# Patient Record
Sex: MALE | Race: WHITE | NOT HISPANIC OR LATINO | Employment: FULL TIME | URBAN - NONMETROPOLITAN AREA
[De-identification: names, ages, dates, MRNs, and addresses within clinical notes are randomized per-mention and may not be internally consistent; named-entity substitution may affect disease eponyms.]

---

## 2017-02-09 ENCOUNTER — HOSPITAL ENCOUNTER (EMERGENCY)
Facility: HOSPITAL | Age: 62
Discharge: HOME | End: 2017-02-09
Admitting: NURSE PRACTITIONER

## 2017-02-09 ENCOUNTER — APPOINTMENT (OUTPATIENT)
Dept: CT IMAGING | Facility: HOSPITAL | Age: 62
End: 2017-02-09

## 2017-02-09 VITALS
SYSTOLIC BLOOD PRESSURE: 135 MMHG | RESPIRATION RATE: 20 BRPM | OXYGEN SATURATION: 99 % | HEART RATE: 90 BPM | WEIGHT: 220 LBS | HEIGHT: 71 IN | BODY MASS INDEX: 30.8 KG/M2 | DIASTOLIC BLOOD PRESSURE: 78 MMHG | TEMPERATURE: 97.8 F

## 2017-02-09 DIAGNOSIS — S01.112A EYELID LACERATION, LEFT, INITIAL ENCOUNTER: Primary | ICD-10-CM

## 2017-02-09 PROCEDURE — 96372 THER/PROPH/DIAG INJ SC/IM: CPT

## 2017-02-09 PROCEDURE — 25010000002 ONDANSETRON PER 1 MG: Performed by: NURSE PRACTITIONER

## 2017-02-09 PROCEDURE — 25010000002 HYDROMORPHONE PER 4 MG: Performed by: NURSE PRACTITIONER

## 2017-02-09 PROCEDURE — 70486 CT MAXILLOFACIAL W/O DYE: CPT

## 2017-02-09 PROCEDURE — 99284 EMERGENCY DEPT VISIT MOD MDM: CPT

## 2017-02-09 RX ORDER — LIDOCAINE HYDROCHLORIDE AND EPINEPHRINE 10; 10 MG/ML; UG/ML
20 INJECTION, SOLUTION INFILTRATION; PERINEURAL ONCE
Status: DISCONTINUED | OUTPATIENT
Start: 2017-02-09 | End: 2017-02-10 | Stop reason: HOSPADM

## 2017-02-09 RX ORDER — ONDANSETRON 2 MG/ML
4 INJECTION INTRAMUSCULAR; INTRAVENOUS ONCE
Status: COMPLETED | OUTPATIENT
Start: 2017-02-09 | End: 2017-02-09

## 2017-02-09 RX ADMIN — HYDROMORPHONE HYDROCHLORIDE 1 MG: 1 INJECTION, SOLUTION INTRAMUSCULAR; INTRAVENOUS; SUBCUTANEOUS at 21:37

## 2017-02-09 RX ADMIN — HYDROMORPHONE HYDROCHLORIDE 1 MG: 1 INJECTION, SOLUTION INTRAMUSCULAR; INTRAVENOUS; SUBCUTANEOUS at 22:56

## 2017-02-09 RX ADMIN — ONDANSETRON 4 MG: 2 INJECTION INTRAMUSCULAR; INTRAVENOUS at 21:37

## 2017-02-10 NOTE — ED PROVIDER NOTES
"Subjective   HPI Comments: Patient is a 7-year-old white male presents after tripping and falling and injuring his left eye with his finger.  He sustained a laceration to the left upper eyelid.  This occurred about an hour prior to arrival.    Patient is a 61 y.o. male presenting with eye problem.   History provided by:  Patient   used: No    Eye Problem       Review of Systems   Constitutional: Negative.    HENT: Negative.    Eyes:        Pt states that he tripped and fell and hit his eye with his finger. He denies syncope. He denies loc. He sustained a laceration to left upper lid    Respiratory: Negative.    Cardiovascular: Negative.    Gastrointestinal: Negative.    Endocrine: Negative.    Genitourinary: Negative.    Musculoskeletal: Negative.    Skin: Negative.    Allergic/Immunologic: Negative.    Neurological: Negative.    Hematological: Negative.    Psychiatric/Behavioral: Negative.    All other systems reviewed and are negative.      History reviewed. No pertinent past medical history.    No Known Allergies    Past Surgical History   Procedure Laterality Date   • Cholecystectomy     • Adenoidectomy         History reviewed. No pertinent family history.    Social History     Social History   • Marital status:      Spouse name: N/A   • Number of children: N/A   • Years of education: N/A     Social History Main Topics   • Smoking status: Current Every Day Smoker     Packs/day: 0.50   • Smokeless tobacco: None   • Alcohol use No   • Drug use: No   • Sexual activity: Not Asked     Other Topics Concern   • None     Social History Narrative   • None       Prior to Admission medications    Not on File       Visit Vitals   • /78   • Pulse 90   • Temp 97.8 °F (36.6 °C)   • Resp 20   • Ht 71\" (180.3 cm)   • Wt 220 lb (99.8 kg)   • SpO2 99%   • BMI 30.68 kg/m2       Objective   Physical Exam   Constitutional: He is oriented to person, place, and time. He appears well-developed and " well-nourished. No distress.   HENT:   Head: Normocephalic. Head is without raccoon's eyes, without Thayer's sign, without abrasion, without contusion and without laceration.   Right Ear: External ear normal.   Left Ear: External ear normal.   Nose: Nose normal.   Mouth/Throat: Oropharynx is clear and moist.   Eyes: Conjunctivae, EOM and lids are normal. Pupils are equal, round, and reactive to light.   Left eye: moderate amt of swelling to left upper lid. approx 2cm horizontal  laceration to left upper lid that extends to inner aspect of lid. There is another vertical laceration noted that extends through the lash line. Bleeding controlled. eoms intact    Neck: Trachea normal and normal range of motion. Neck supple. Normal carotid pulses and no JVD present. No spinous process tenderness and no muscular tenderness present. No rigidity. No tracheal deviation and normal range of motion present.   Cardiovascular: Normal rate, regular rhythm, normal heart sounds, intact distal pulses and normal pulses.    Pulmonary/Chest: Effort normal and breath sounds normal. No accessory muscle usage or stridor. No respiratory distress. He exhibits no mass, no tenderness, no bony tenderness, no laceration, no crepitus, no deformity and no swelling.   Abdominal: Soft. Normal aorta and bowel sounds are normal. He exhibits no distension and no pulsatile midline mass. There is no tenderness.   Musculoskeletal: Normal range of motion. He exhibits no edema, tenderness or deformity.        Cervical back: Normal. He exhibits no tenderness, no bony tenderness, no deformity and no pain.        Thoracic back: Normal. He exhibits no tenderness, no bony tenderness, no pain and no spasm.        Lumbar back: Normal. He exhibits normal range of motion, no tenderness, no bony tenderness and no deformity.   Neurological: He is alert and oriented to person, place, and time. He has normal strength and normal reflexes. He displays normal reflexes. No  cranial nerve deficit. He exhibits normal muscle tone. GCS eye subscore is 4. GCS verbal subscore is 5. GCS motor subscore is 6.   Skin: Skin is warm, dry and intact. He is not diaphoretic. No pallor.   Psychiatric: He has a normal mood and affect.   Nursing note and vitals reviewed.      Procedures         Lab Results (last 24 hours)     ** No results found for the last 24 hours. **          CT Facial Bones Without Contrast   ED Interpretation   1. Laceration of the eyelid that appears full thickness, minimal   laceration of the anterior surface of the globe difficult to exclude on   this examination. Correlate with physical findings. The globe is   otherwise intact with periorbital soft tissue swelling.   2. No facial bone fracture.       These findings were described on a digital voice clip recorded on the   PACS system at the time of dictation.       This report was finalized on 02/09/2017 21:54 by Dr. Kenroy Alves MD.      Final Result   1. Laceration of the eyelid that appears full thickness, minimal   laceration of the anterior surface of the globe difficult to exclude on   this examination. Correlate with physical findings. The globe is   otherwise intact with periorbital soft tissue swelling.   2. No facial bone fracture.       These findings were described on a digital voice clip recorded on the   PACS system at the time of dictation.       This report was finalized on 02/09/2017 21:54 by Dr. Kenroy Alves MD.          ED Course  ED Course   Comment By Time   Reviewed pt and pt care plan with dr mondragon- also assessed pt and in agreement with pt care plan. Call placed to dr pate for further  Monserrat Gresham, APRN 02/09 2127   Dr mondragon spoke with dr pate- will be in to see pt Monserrat Gresham, APRN 02/09 2217   Dr bhandari also here to see pt  Monserrat Gresham, APRN 02/09 2313   Dr pate advised to have follow up with him on Monday- will write for gentamycin opth ointment. Will be discharged home  soon in stable condition  Monserrat Gresham, APRN 02/09 2336          MDM  Number of Diagnoses or Management Options  Eyelid laceration, left, initial encounter: new and requires workup     Amount and/or Complexity of Data Reviewed  Clinical lab tests: ordered and reviewed  Tests in the radiology section of CPT®: ordered and reviewed  Independent visualization of images, tracings, or specimens: yes    Risk of Complications, Morbidity, and/or Mortality  Presenting problems: moderate  Diagnostic procedures: moderate  Management options: moderate    Patient Progress  Patient progress: stable      Final diagnoses:   Eyelid laceration, left, initial encounter          Monserrat Gresham, APRMOHINDER  02/13/17 2315

## 2017-02-10 NOTE — CONSULTS
Mr. Gates is a 61-year-old right-handed white male who fell on some stairs and stuck himself in his left thigh with his own finger sustaining a laceration of his upper eyelid.  The patient denies any visual changes loss of consciousness etc.    PAST MEDICAL HISTORY  None    PAST SURGICAL HISTORY   Cholecystectomy     MEDICATIONS  None    ALLERGIES  No known allergies    SOCIAL HISTORY  The patient smokes one half pack of cigarettes per day and drinks on very rare occasion    FAMILY HISTORY  Noncontributory    REVIEW OF SYSTEMS  No additional pertinent information gleaned    PHYSICAL EXAMINATION    There is a 3 mm full-thickness vertical laceration of the upper eyelid at approximately the midportion.  There is a 6 mm transverse laceration superficially along the skin at approximately the level of the superior border of the tarsal plate.    PROCEDURE    1% lidocaine with epinephrine was used for local infiltration anesthesia of the upper eyelid.  The vertical laceration was repaired using interrupted 6-0 nylon sutures.    ASSESSMENT  Full-thickness upper eyelid laceration    PLAN  The patient will apply gentamicin ophthalmic ointment 3 times a day and follow up with me in 4 days.

## 2017-02-10 NOTE — CONSULTS
Ophthalmology Consult Note    Referring Provider: Emergency Department  Reason for Consultation: left eye trauma    Patient Care Team:  Endy Ortega MD as PCP - General (General Practice)    Chief complaint left eye pain, trauma, eyelid laceration    Subjective .     History of present illness:  61 y.o. white male presented to ER tonight after falling and reportedly hitting his eyelid with his finger. Pt noticed he had a eyelid laceration leading to his visit to the ER. Pt denies any changes in vision. Denies photophobia. Reports mild pain around the left eye. Denies any floaters. Pt has not had any eye surgeries in past and wear OTC readers only.     Review of Systems  Pertinent items are noted in HPI, all other systems reviewed and negative    History  History reviewed. No pertinent past medical history., Past Surgical History   Procedure Laterality Date   • Cholecystectomy     • Adenoidectomy     , History reviewed. No pertinent family history., Social History   Substance Use Topics   • Smoking status: Current Every Day Smoker     Packs/day: 0.50   • Smokeless tobacco: None   • Alcohol use No   ,   (Not in a hospital admission) and Allergies:  Review of patient's allergies indicates no known allergies.    Objective     Vital Signs   Temp:  [97.8 °F (36.6 °C)] 97.8 °F (36.6 °C)  Heart Rate:  [110] 110  Resp:  [20] 20  BP: (142)/(84) 142/84    Physical Exam:  Mental Status: Awake, alert, and oriented x 3    Visual acuity:   Right eye: 20/25 near with readers   Left eye: 20/25 near with readers    Intraocular Pressure (obtained with tonopen)   Right eye: 9    Left eye: 9    Pupils: Pupils equally round and reactive to light and accomodation    Confrontational Visual fields: Full OU     Extraocular movements: Full OU    External:  Mild ecchymoses over DIAMOND and LLL, full thickness lid margin laceration of central DIAMOND. No proptosis.     Slitlamp exam:   Lids/lashes: Mild ecchymoses over DIAMOND and LLL, full  thickness lid margin laceration of central DIAMOND,   Conjunctiva/Sclera: white and quiet OU, no subconj heme  Cornea: Clear OU, no abrasion  Anterior chamber: , moderate depth, quiet OU, no heme or inflammation  Iris: Round and regular OU, no iridodialyis  Lens: moderate nuclear sclerosis OU    Dilated exam deferred.     Results Review:   I reviewed the patient's new clinical results.    CT- revealed normal globes with lid margin laceration with mild surrounding soft tissue swelling.     Assessment/Plan     Active Problems:    * No active hospital problems. *    1) Left upper eyelid laceration-  Globes well spared in trauma, no ocular damage.  Lid margin laceration of left upper eyelid. Dr. Hyatt planning to repair at bedside. Would recommend abx ointment (Erythromycin vs. Polysporin vs. Tobramycin) to wound tid x 5 days. Return precautions emphasized for any changes in vision, photophobia, eye pain.     I discussed the patients findings and my recommendations with patient, family, primary care team and consulting provider     Thanks for consult. Please call with any further questions.     Osorio Johnson MD  02/09/17  11:12 PM

## 2017-03-08 ENCOUNTER — HOSPITAL ENCOUNTER (EMERGENCY)
Facility: HOSPITAL | Age: 62
Discharge: HOME | End: 2017-03-08
Attending: FAMILY MEDICINE | Admitting: FAMILY MEDICINE

## 2017-03-08 ENCOUNTER — APPOINTMENT (OUTPATIENT)
Dept: CT IMAGING | Facility: HOSPITAL | Age: 62
End: 2017-03-08

## 2017-03-08 VITALS
RESPIRATION RATE: 14 BRPM | OXYGEN SATURATION: 98 % | SYSTOLIC BLOOD PRESSURE: 132 MMHG | HEIGHT: 71 IN | WEIGHT: 222.2 LBS | HEART RATE: 78 BPM | DIASTOLIC BLOOD PRESSURE: 77 MMHG | TEMPERATURE: 97.7 F | BODY MASS INDEX: 31.11 KG/M2

## 2017-03-08 DIAGNOSIS — N23 RENAL COLIC ON LEFT SIDE: Primary | ICD-10-CM

## 2017-03-08 DIAGNOSIS — N20.1 URETEROLITHIASIS: ICD-10-CM

## 2017-03-08 LAB
ALBUMIN SERPL-MCNC: 3.9 G/DL (ref 3.5–5)
ALBUMIN/GLOB SERPL: 1.1 G/DL (ref 1.1–2.5)
ALP SERPL-CCNC: 72 U/L (ref 24–120)
ALT SERPL W P-5'-P-CCNC: 50 U/L (ref 0–54)
AMYLASE SERPL-CCNC: 55 U/L (ref 30–110)
ANION GAP SERPL CALCULATED.3IONS-SCNC: 10 MMOL/L (ref 4–13)
AST SERPL-CCNC: 47 U/L (ref 7–45)
BACTERIA UR QL AUTO: ABNORMAL /HPF
BASOPHILS # BLD AUTO: 0.03 10*3/MM3 (ref 0–0.2)
BASOPHILS NFR BLD AUTO: 0.3 % (ref 0–2)
BILIRUB SERPL-MCNC: 1.1 MG/DL (ref 0.1–1)
BILIRUB UR QL STRIP: NEGATIVE
BUN BLD-MCNC: 21 MG/DL (ref 5–21)
BUN/CREAT SERPL: 14.2 (ref 7–25)
CALCIUM SPEC-SCNC: 9.4 MG/DL (ref 8.4–10.4)
CHLORIDE SERPL-SCNC: 97 MMOL/L (ref 98–110)
CLARITY UR: ABNORMAL
CO2 SERPL-SCNC: 27 MMOL/L (ref 24–31)
COLOR UR: YELLOW
CREAT BLD-MCNC: 1.48 MG/DL (ref 0.5–1.4)
DEPRECATED RDW RBC AUTO: 40.8 FL (ref 40–54)
EOSINOPHIL # BLD AUTO: 0.18 10*3/MM3 (ref 0–0.7)
EOSINOPHIL NFR BLD AUTO: 1.6 % (ref 0–4)
ERYTHROCYTE [DISTWIDTH] IN BLOOD BY AUTOMATED COUNT: 13.1 % (ref 12–15)
ETHANOL UR QL: <0.01 %
GFR SERPL CREATININE-BSD FRML MDRD: 48 ML/MIN/1.73
GLOBULIN UR ELPH-MCNC: 3.7 GM/DL
GLUCOSE BLD-MCNC: 103 MG/DL (ref 70–100)
GLUCOSE UR STRIP-MCNC: NEGATIVE MG/DL
HCT VFR BLD AUTO: 46 % (ref 40–52)
HGB BLD-MCNC: 16.2 G/DL (ref 14–18)
HGB UR QL STRIP.AUTO: ABNORMAL
HYALINE CASTS UR QL AUTO: ABNORMAL /LPF
IMM GRANULOCYTES # BLD: 0.02 10*3/MM3 (ref 0–0.03)
IMM GRANULOCYTES NFR BLD: 0.2 % (ref 0–5)
KETONES UR QL STRIP: NEGATIVE
LEUKOCYTE ESTERASE UR QL STRIP.AUTO: ABNORMAL
LIPASE SERPL-CCNC: 34 U/L (ref 23–203)
LYMPHOCYTES # BLD AUTO: 1.6 10*3/MM3 (ref 0.72–4.86)
LYMPHOCYTES NFR BLD AUTO: 14.4 % (ref 15–45)
MCH RBC QN AUTO: 30.2 PG (ref 28–32)
MCHC RBC AUTO-ENTMCNC: 35.2 G/DL (ref 33–36)
MCV RBC AUTO: 85.7 FL (ref 82–95)
MONOCYTES # BLD AUTO: 1.37 10*3/MM3 (ref 0.19–1.3)
MONOCYTES NFR BLD AUTO: 12.4 % (ref 4–12)
NEUTROPHILS # BLD AUTO: 7.89 10*3/MM3 (ref 1.87–8.4)
NEUTROPHILS NFR BLD AUTO: 71.1 % (ref 39–78)
NITRITE UR QL STRIP: NEGATIVE
PH UR STRIP.AUTO: 6 [PH] (ref 5–8)
PLATELET # BLD AUTO: 202 10*3/MM3 (ref 130–400)
PMV BLD AUTO: 10.9 FL (ref 6–12)
POTASSIUM BLD-SCNC: 4.3 MMOL/L (ref 3.5–5.3)
PROT SERPL-MCNC: 7.6 G/DL (ref 6.3–8.7)
PROT UR QL STRIP: NEGATIVE
RBC # BLD AUTO: 5.37 10*6/MM3 (ref 4.8–5.9)
RBC # UR: ABNORMAL /HPF
REF LAB TEST METHOD: ABNORMAL
SODIUM BLD-SCNC: 134 MMOL/L (ref 135–145)
SP GR UR STRIP: <=1.005 (ref 1–1.03)
SQUAMOUS #/AREA URNS HPF: ABNORMAL /HPF
TROPONIN I SERPL-MCNC: <0.012 NG/ML (ref 0–0.03)
UROBILINOGEN UR QL STRIP: ABNORMAL
WBC NRBC COR # BLD: 11.09 10*3/MM3 (ref 4.8–10.8)
WBC UR QL AUTO: ABNORMAL /HPF

## 2017-03-08 PROCEDURE — 25010000002 ONDANSETRON PER 1 MG: Performed by: FAMILY MEDICINE

## 2017-03-08 PROCEDURE — 81001 URINALYSIS AUTO W/SCOPE: CPT | Performed by: FAMILY MEDICINE

## 2017-03-08 PROCEDURE — 84484 ASSAY OF TROPONIN QUANT: CPT | Performed by: FAMILY MEDICINE

## 2017-03-08 PROCEDURE — 93005 ELECTROCARDIOGRAM TRACING: CPT | Performed by: FAMILY MEDICINE

## 2017-03-08 PROCEDURE — 96361 HYDRATE IV INFUSION ADD-ON: CPT

## 2017-03-08 PROCEDURE — 96376 TX/PRO/DX INJ SAME DRUG ADON: CPT

## 2017-03-08 PROCEDURE — 99283 EMERGENCY DEPT VISIT LOW MDM: CPT

## 2017-03-08 PROCEDURE — 0 IOPAMIDOL 61 % SOLUTION: Performed by: FAMILY MEDICINE

## 2017-03-08 PROCEDURE — 96375 TX/PRO/DX INJ NEW DRUG ADDON: CPT

## 2017-03-08 PROCEDURE — 80053 COMPREHEN METABOLIC PANEL: CPT | Performed by: FAMILY MEDICINE

## 2017-03-08 PROCEDURE — 82150 ASSAY OF AMYLASE: CPT | Performed by: FAMILY MEDICINE

## 2017-03-08 PROCEDURE — 83690 ASSAY OF LIPASE: CPT | Performed by: FAMILY MEDICINE

## 2017-03-08 PROCEDURE — 85025 COMPLETE CBC W/AUTO DIFF WBC: CPT | Performed by: FAMILY MEDICINE

## 2017-03-08 PROCEDURE — 87086 URINE CULTURE/COLONY COUNT: CPT | Performed by: FAMILY MEDICINE

## 2017-03-08 PROCEDURE — 96374 THER/PROPH/DIAG INJ IV PUSH: CPT

## 2017-03-08 PROCEDURE — 25010000002 HYDROMORPHONE PER 4 MG: Performed by: FAMILY MEDICINE

## 2017-03-08 PROCEDURE — 93010 ELECTROCARDIOGRAM REPORT: CPT | Performed by: INTERNAL MEDICINE

## 2017-03-08 PROCEDURE — 80307 DRUG TEST PRSMV CHEM ANLYZR: CPT | Performed by: FAMILY MEDICINE

## 2017-03-08 PROCEDURE — 74177 CT ABD & PELVIS W/CONTRAST: CPT

## 2017-03-08 PROCEDURE — 25010000002 KETOROLAC TROMETHAMINE PER 15 MG: Performed by: FAMILY MEDICINE

## 2017-03-08 RX ORDER — ONDANSETRON 2 MG/ML
2 INJECTION INTRAMUSCULAR; INTRAVENOUS ONCE
Status: COMPLETED | OUTPATIENT
Start: 2017-03-08 | End: 2017-03-08

## 2017-03-08 RX ORDER — KETOROLAC TROMETHAMINE 30 MG/ML
30 INJECTION, SOLUTION INTRAMUSCULAR; INTRAVENOUS ONCE
Status: COMPLETED | OUTPATIENT
Start: 2017-03-08 | End: 2017-03-08

## 2017-03-08 RX ORDER — ONDANSETRON 4 MG/1
4 TABLET, ORALLY DISINTEGRATING ORAL 4 TIMES DAILY PRN
Qty: 20 TABLET | Refills: 0 | Status: SHIPPED | OUTPATIENT
Start: 2017-03-08

## 2017-03-08 RX ORDER — KETOROLAC TROMETHAMINE 30 MG/ML
INJECTION, SOLUTION INTRAMUSCULAR; INTRAVENOUS
Status: DISCONTINUED
Start: 2017-03-08 | End: 2017-03-08 | Stop reason: HOSPADM

## 2017-03-08 RX ORDER — HYDROCODONE BITARTRATE AND ACETAMINOPHEN 7.5; 325 MG/1; MG/1
1 TABLET ORAL EVERY 4 HOURS PRN
Qty: 18 TABLET | Refills: 0 | Status: SHIPPED | OUTPATIENT
Start: 2017-03-08

## 2017-03-08 RX ORDER — ONDANSETRON 2 MG/ML
4 INJECTION INTRAMUSCULAR; INTRAVENOUS ONCE
Status: COMPLETED | OUTPATIENT
Start: 2017-03-08 | End: 2017-03-08

## 2017-03-08 RX ORDER — SODIUM CHLORIDE 9 MG/ML
125 INJECTION, SOLUTION INTRAVENOUS CONTINUOUS
Status: DISCONTINUED | OUTPATIENT
Start: 2017-03-08 | End: 2017-03-08 | Stop reason: HOSPADM

## 2017-03-08 RX ORDER — KETOROLAC TROMETHAMINE 10 MG/1
10 TABLET, FILM COATED ORAL EVERY 6 HOURS PRN
Qty: 10 TABLET | Refills: 0 | Status: SHIPPED | OUTPATIENT
Start: 2017-03-08

## 2017-03-08 RX ORDER — TAMSULOSIN HYDROCHLORIDE 0.4 MG/1
1 CAPSULE ORAL DAILY
Qty: 30 CAPSULE | Refills: 0 | Status: SHIPPED | OUTPATIENT
Start: 2017-03-08

## 2017-03-08 RX ADMIN — ONDANSETRON 4 MG: 2 INJECTION INTRAMUSCULAR; INTRAVENOUS at 19:57

## 2017-03-08 RX ADMIN — KETOROLAC TROMETHAMINE 30 MG: 30 INJECTION, SOLUTION INTRAMUSCULAR at 20:48

## 2017-03-08 RX ADMIN — HYDROMORPHONE HYDROCHLORIDE 0.5 MG: 1 INJECTION, SOLUTION INTRAMUSCULAR; INTRAVENOUS; SUBCUTANEOUS at 19:58

## 2017-03-08 RX ADMIN — SODIUM CHLORIDE 125 ML/HR: 9 INJECTION, SOLUTION INTRAVENOUS at 20:00

## 2017-03-08 RX ADMIN — ONDANSETRON 2 MG: 2 INJECTION INTRAMUSCULAR; INTRAVENOUS at 20:42

## 2017-03-08 RX ADMIN — IOPAMIDOL 100 ML: 612 INJECTION, SOLUTION INTRAVENOUS at 20:40

## 2017-03-08 RX ADMIN — HYDROMORPHONE HYDROCHLORIDE 1 MG: 1 INJECTION, SOLUTION INTRAMUSCULAR; INTRAVENOUS; SUBCUTANEOUS at 20:42

## 2017-03-09 ENCOUNTER — APPOINTMENT (OUTPATIENT)
Dept: PREADMISSION TESTING | Facility: HOSPITAL | Age: 62
End: 2017-03-09

## 2017-03-09 ENCOUNTER — OFFICE VISIT (OUTPATIENT)
Dept: UROLOGY | Facility: CLINIC | Age: 62
End: 2017-03-09

## 2017-03-09 VITALS
DIASTOLIC BLOOD PRESSURE: 76 MMHG | SYSTOLIC BLOOD PRESSURE: 128 MMHG | HEIGHT: 71 IN | WEIGHT: 224 LBS | BODY MASS INDEX: 31.36 KG/M2 | TEMPERATURE: 97.5 F

## 2017-03-09 DIAGNOSIS — N20.1 LEFT URETERAL CALCULUS: ICD-10-CM

## 2017-03-09 DIAGNOSIS — N20.1 LEFT URETERAL CALCULUS: Primary | ICD-10-CM

## 2017-03-09 LAB
BILIRUB BLD-MCNC: ABNORMAL MG/DL
CLARITY, POC: CLEAR
COLOR UR: YELLOW
GLUCOSE UR STRIP-MCNC: NEGATIVE MG/DL
KETONES UR QL: NEGATIVE
LEUKOCYTE EST, POC: ABNORMAL
NITRITE UR-MCNC: NEGATIVE MG/ML
PH UR: 5 [PH] (ref 5–8)
PROT UR STRIP-MCNC: ABNORMAL MG/DL
RBC # UR STRIP: NEGATIVE /UL
SP GR UR: 1.02 (ref 1–1.03)
UROBILINOGEN UR QL: NORMAL

## 2017-03-09 PROCEDURE — 99204 OFFICE O/P NEW MOD 45 MIN: CPT | Performed by: UROLOGY

## 2017-03-09 PROCEDURE — 81003 URINALYSIS AUTO W/O SCOPE: CPT | Performed by: UROLOGY

## 2017-03-09 RX ORDER — SODIUM CHLORIDE, SODIUM LACTATE, POTASSIUM CHLORIDE, CALCIUM CHLORIDE 600; 310; 30; 20 MG/100ML; MG/100ML; MG/100ML; MG/100ML
100 INJECTION, SOLUTION INTRAVENOUS CONTINUOUS
Status: CANCELLED | OUTPATIENT
Start: 2017-03-09

## 2017-03-09 NOTE — ED PROVIDER NOTES
Subjective   Patient is a 61 y.o. male presenting with abdominal pain.   Abdominal Pain   Pain location:  LLQ  Pain quality: aching and cramping    Pain radiates to:  Does not radiate  Pain severity:  Mild  Onset quality:  Gradual  Duration:  4 days  Timing:  Intermittent  Progression:  Waxing and waning  Chronicity:  New  Context: not awakening from sleep, not diet changes, not eating, not laxative use, not medication withdrawal, not previous surgeries, not recent illness, not recent travel, not retching, not sick contacts, not suspicious food intake and not trauma    Relieved by:  Not moving  Worsened by:  Eating and movement  Associated symptoms: no anorexia, no belching, no chest pain, no chills, no constipation, no cough, no diarrhea, no dysuria, no fatigue, no fever, no hematemesis, no hematochezia, no hematuria, no melena, no nausea, no shortness of breath, no sore throat and no vomiting    Risk factors: has not had multiple surgeries        Review of Systems   Constitutional: Negative for chills, fatigue and fever.   HENT: Negative for sore throat.    Respiratory: Negative for cough and shortness of breath.    Cardiovascular: Negative for chest pain.   Gastrointestinal: Positive for abdominal pain. Negative for anorexia, constipation, diarrhea, hematemesis, hematochezia, melena, nausea and vomiting.   Genitourinary: Negative for dysuria and hematuria.       History reviewed. No pertinent past medical history.    No Known Allergies    Past Surgical History   Procedure Laterality Date   • Cholecystectomy     • Adenoidectomy         History reviewed. No pertinent family history.    Social History     Social History   • Marital status:      Spouse name: N/A   • Number of children: N/A   • Years of education: N/A     Social History Main Topics   • Smoking status: Current Every Day Smoker     Packs/day: 0.50   • Smokeless tobacco: None   • Alcohol use No   • Drug use: No   • Sexual activity: Not Asked      Other Topics Concern   • None     Social History Narrative           Objective   Physical Exam   Constitutional: He is oriented to person, place, and time. He appears well-developed and well-nourished. No distress.   HENT:   Head: Atraumatic.   Nose: Nose normal.   Mouth/Throat: Oropharynx is clear and moist.   Eyes: Conjunctivae are normal. Pupils are equal, round, and reactive to light. No scleral icterus.   Neck: Normal range of motion. Neck supple. No JVD present. No thyromegaly present.   Cardiovascular: Normal rate, regular rhythm, normal heart sounds and intact distal pulses.    No murmur heard.  Pulmonary/Chest: Effort normal and breath sounds normal. No respiratory distress. He has no wheezes. He has no rales. He exhibits no tenderness.   Abdominal: Soft. Bowel sounds are normal. He exhibits no distension and no mass. There is tenderness. There is no rebound and no guarding. No hernia.       Musculoskeletal: Normal range of motion. He exhibits no edema.   Lymphadenopathy:     He has no cervical adenopathy.   Neurological: He is alert and oriented to person, place, and time. He has normal reflexes. No cranial nerve deficit. Coordination normal.   Skin: Skin is warm and dry. No rash noted. He is not diaphoretic. No erythema. No pallor.   Psychiatric: He has a normal mood and affect. His behavior is normal. Judgment and thought content normal.   Nursing note and vitals reviewed.      Procedures         ED Course  ED Course   Comment By Time   Patient now pain free and requests to go home, I have placed a call to [facility] to arrange follow up.  Nima Jean MD 03/08 2116                  MDM  Number of Diagnoses or Management Options  Renal colic on left side: new and requires workup  Ureterolithiasis: new and requires workup     Amount and/or Complexity of Data Reviewed  Clinical lab tests: ordered and reviewed  Tests in the radiology section of CPT®: ordered and reviewed  Obtain history from someone  other than the patient: yes  Review and summarize past medical records: yes  Discuss the patient with other providers: yes  Independent visualization of images, tracings, or specimens: yes    Risk of Complications, Morbidity, and/or Mortality  Presenting problems: high  Diagnostic procedures: high  Management options: high    Patient Progress  Patient progress: improved      Final diagnoses:   Renal colic on left side   Ureterolithiasis            Nima Jean MD  03/08/17 [id number]

## 2017-03-09 NOTE — PROGRESS NOTES
Mr. Gates is 61 y.o. male    CHIEF COMPLAINT: I have a stone    HPI  Urolithiasis  Patient complains of left flank pain. This started 5 days(s) ago. The pain is described as aching and colicky without radiation at all. Associated manifestations include nausea. Severity, when pain is present,  is rated at 8/10 moderate relief has been experienced from oral pain medication. The patient has no pior history of urolithiasis.         The following portions of the patient's history were reviewed and updated as appropriate: allergies, current medications, past family history, past medical history, past social history, past surgical history and problem list.    Review of Systems   Constitutional: Negative for appetite change and fever.   HENT: Negative for hearing loss and sore throat.    Eyes: Negative for pain and redness.   Respiratory: Negative for cough and shortness of breath.    Cardiovascular: Negative for chest pain and leg swelling.   Gastrointestinal: Negative for anal bleeding, nausea and vomiting.   Endocrine: Negative for cold intolerance and heat intolerance.   Genitourinary: Positive for flank pain. Negative for dysuria and hematuria.   Musculoskeletal: Negative for joint swelling and myalgias.   Skin: Negative for color change and rash.   Allergic/Immunologic: Negative for immunocompromised state.   Neurological: Negative for dizziness and speech difficulty.   Hematological: Negative for adenopathy. Does not bruise/bleed easily.   Psychiatric/Behavioral: Negative for dysphoric mood and suicidal ideas.         Current Outpatient Prescriptions:   •  gentamicin (GENTAK) 0.3 % ophthalmic ointment, Administer  into the left eye 3 (Three) Times a Day., Disp: 3.5 g, Rfl: 0  •  HYDROcodone-acetaminophen (NORCO) 7.5-325 MG per tablet, Take 1 tablet by mouth Every 4 (Four) Hours As Needed for moderate pain (4-6) for up to 18 doses., Disp: 18 tablet, Rfl: 0  •  ketorolac (TORADOL) 10 MG tablet, Take 1 tablet by mouth  "Every 6 (Six) Hours As Needed for moderate pain (4-6) for up to 10 doses., Disp: 10 tablet, Rfl: 0  •  ondansetron ODT (ZOFRAN-ODT) 4 MG disintegrating tablet, Take 1 tablet by mouth 4 (Four) Times a Day As Needed for Nausea or Vomiting for up to 20 doses., Disp: 20 tablet, Rfl: 0  •  tamsulosin (FLOMAX) 0.4 MG capsule 24 hr capsule, Take 1 capsule by mouth Daily., Disp: 30 capsule, Rfl: 0  No current facility-administered medications for this visit.     History reviewed. No pertinent past medical history.    Past Surgical History   Procedure Laterality Date   • Cholecystectomy     • Adenoidectomy         Social History     Social History   • Marital status:      Spouse name: N/A   • Number of children: N/A   • Years of education: N/A     Social History Main Topics   • Smoking status: Current Every Day Smoker     Packs/day: 0.50   • Smokeless tobacco: None   • Alcohol use No   • Drug use: No   • Sexual activity: Not Asked     Other Topics Concern   • None     Social History Narrative       History reviewed. No pertinent family history.    Visit Vitals   • /76   • Temp 97.5 °F (36.4 °C)   • Ht 71\" (180.3 cm)   • Wt 224 lb (102 kg)   • BMI 31.24 kg/m2       Physical Exam   Constitutional: He is oriented to person, place, and time. He appears well-developed and well-nourished. No distress.   HENT:   Head: Normocephalic and atraumatic.   Right Ear: External ear and ear canal normal.   Left Ear: External ear and ear canal normal.   Nose: No nasal deformity. No epistaxis.   Mouth/Throat: Oropharynx is clear and moist. Mucous membranes are not pale, not dry and not cyanotic. Normal dentition. No oropharyngeal exudate.   Neck: Trachea normal. No tracheal tenderness present. No tracheal deviation present. No thyroid mass and no thyromegaly present.   Pulmonary/Chest: Effort normal. No accessory muscle usage. No respiratory distress. Chest wall is not dull to percussion (No flatness or hyperresonance). He " exhibits no mass and no tenderness.   On palpation, no tactile fremitus. All movements are symmetric. No intercostal retraction noted.    Abdominal: Soft. Normal appearance. He exhibits no distension and no mass. There is no hepatosplenomegaly. There is tenderness in the left upper quadrant. There is CVA tenderness (LEFT). No hernia.   Rectal examination or stool specimen is not indicated.    Musculoskeletal:   Normal gait and station. The spine, ribs, and pelvis are examined. No obvious misalignment or asymmetry. ROM is reasonable for age. No instability. No obvious atrophy, flaccidity or spasticity.    Lymphadenopathy:     He has no cervical adenopathy.        Right: No inguinal adenopathy present.        Left: No inguinal adenopathy present.   Neurological: He is alert and oriented to person, place, and time.   Skin: Skin is warm, dry and intact. No lesion and no rash noted. He is not diaphoretic. No cyanosis. No pallor. Nails show no clubbing.   On palpation, there were no induration, subcutaneous nodules, or tightening   Psychiatric: His speech is normal and behavior is normal. Judgment and thought content normal. His mood appears not anxious. His affect is not labile. He does not exhibit a depressed mood.   Vitals reviewed.        Results for orders placed or performed in visit on 03/09/17   POC Urinalysis Dipstick, Automated   Result Value Ref Range    Color Yellow Yellow, Straw, Dark Yellow, Yulia    Clarity, UA Clear Clear    Glucose, UA Negative Negative, 1000 mg/dL (3+) mg/dL    Bilirubin Small (1+) (A) Negative    Ketones, UA Negative Negative    Specific Gravity  1.025 1.005 - 1.030    Blood, UA Negative Negative    pH, Urine 5.0 5.0 - 8.0    Protein, POC Trace (A) Negative mg/dL    Urobilinogen, UA Normal Normal    Leukocytes Trace (A) Negative    Nitrite, UA Negative Negative     Independent review of a CT scan of abdomen and pelvis without contrast  The CT scan of the abdomen/pelvis done without  contrast is available for me to review.  Treatment recommendations require an independent review.  First I scanned the liver, spleen, and bowel pattern.  The retroperitoneum including the major vessels and lymphatic packages are briefly reviewed.  This film as been reviewed by the radiologist to determine any non urologic abnormalities that are present.  The kidneys are closely inspected for size, symmetry, contour, parenchymal thickness, perinephric reaction, presence of calcifications, and intrarenal dilation of the collecting system.  The ureters are inspected for their course, caliber, and any calcifications.  The bladder is inspected for its thickness, size, and presence of any calcifications.  This scan shows an 8 mm stone left distal ureter with moderate hydroureteronephrosis.  He also has bilateral renal calculi..        Assessment and Plan  Diagnoses and all orders for this visit:    Left ureteral calculus  -     POC Urinalysis Dipstick, Automated  -     Case Request; Standing  -     CBC (No Diff); Future  -     Basic Metabolic Panel; Future  -     Urinalysis With Culture if Indicated; Future  -     lactated ringers infusion; Infuse 100 mL/hr into a venous catheter Continuous.  -     ceFAZolin (ANCEF) 2 g in sodium chloride 0.9 % 100 mL IVPB; Infuse 2 g into a venous catheter 1 (One) Time.  -     Case Request    Other orders  -     Follow Anesthesia Guidelines / Standing Orders; Future  -     Obtain Informed Consent  -     Provide NPO Instructions to Patient  -     Verify NPO Status; Standing    The patient has a left distal ureteral calculus that is approximately 8 mm as defined by symptoms and radiographic studies reviewed with the patient.  Options for the management of such stone is discussed based upon size of stone, location, symptoms, and probable composition including watchful waiting, ESWL, ureteroscopy , use of ureteral stenting, percutaneous management, and open surgery.  Based upon this  discussion we have elected to proceed with ureteroscopic management of the stone.  The patient understands that a laser other fragmentation device may be needed.  The need for ureteral stenting including his required removal was discussed.  Risks of pain, discomfort from stent, ureteral stricture that may occur in the future, inability to render stone free, ureteral avulsion/perforation are all discussed.  I explained that in extreme cases this could lead to loss of function of the affected kidney.    Will need further evaluation with a 24-hour urine for metabolic evaluation why he is having onset of stones now bilaterally since she has never had them before.      Neftali Pereira MD  03/09/17  3:32 PM    EMR Dragon/Transcription disclaimer:  Much of this encounter note is an electronic transcription/translation of spoken language to printed text. The electronic translation of spoken language may permit erroneous, or at times, nonsensical words or phrases to be inadvertently transcribed; although I have reviewed the note for such errors, some may still exist.       Cc: Dr. Ortega

## 2017-03-09 NOTE — DISCHARGE INSTRUCTIONS
DAY OF SURGERY INSTRUCTIONS        YOUR SURGEON: ***CELY CHANDRAKANT    PROCEDURE: ***LITHOTRIPSY    DATE OF SURGERY: ***MARCH 10,2017    ARRIVAL TIME: AS DIRECTED BY OFFICE    DAY OF SURGERY TAKE ONLY THESE MEDICATIONS: ***NONE            BEFORE YOU COME TO THE HOSPITAL  (Pre-op instructions)  • Do not eat, drink, smoke or chew gum after midnight the night before surgery.  This also includes no mints.  • Morning of surgery take only the medicines you have been instructed with a sip of water unless otherwise instructed  by your physician.  • Do not shave, wear makeup or dark nail polish.  • Remove all jewelry including rings.  • Leave anything you consider valuable at home.  • Leave your suitcase in the car until after your surgery.  • Bring the following with you if applicable:  o Picture ID and insurance, Medicare or Medicaid cards  o Co-pay/deductible required by insurance (cash, check, credit card)  o Medications (no narcotics) in original bottles (not a list) including all over-the-counter medications.  o Copy of advance directive, living will or power-of- documents if not brought to PAT  o CPAP or BIPAP mask and tubing  o Skin prep instruction sheet  o Relaxation aids (MP3 player, book, magazine)  • Confirm your arrival time with you surgeon the day before your surgery (surgery times are subject to change)  • On the day of surgery check in at registration located at the main entrance of the hospital.       Outpatient Surgery Guidelines, Adult  Outpatient procedures are those for which the person having the procedure is allowed to go home the same day as the procedure. Various procedures are done on an outpatient basis. You should follow some general guidelines if you will be having an outpatient procedure.  LET YOUR HEALTH CARE PROVIDER KNOW ABOUT:  · Any allergies you have.  · All medicines you are taking, including vitamins, herbs, eye drops, creams, and over-the-counter medicines.  · Previous problems  you or members of your family have had with the use of anesthetics.  · Any blood disorders you have.  · Previous surgeries you have had.  · Medical conditions you have.  RISKS AND COMPLICATIONS  Your health care provider will discuss possible risks and complications with you before surgery. Common risks and complications include:    · Problems due to the use of anesthetics.  · Blood loss and replacement (does not apply to minor surgical procedures).  · Temporary increase in pain due to surgery.  · Uncorrected pain or problems that the surgery was meant to correct.  · Infection.  · New damage.  BEFORE THE PROCEDURE  · Ask your health care provider about changing or stopping your regular medicines. You may need to stop taking certain medicines in the days or weeks before the procedure.  · Stop smoking at least 2 weeks before surgery. This lowers your risk for complications during and after surgery. Ask your health care provider for help with this if needed.  · Eat your usual meals and a light supper the day before surgery. Continue fluid intake. Do not drink alcohol.  · Do not eat or drink after midnight the night before your surgery.   · Arrange for someone to take you home and to stay with you for 24 hours after the procedure. Medicine given for your procedure may affect your ability to drive or to care for yourself.  · Call your health care provider's office if you develop an illness or problem that may prevent you from safely having your procedure.  AFTER THE PROCEDURE  After surgery, you will be taken to a recovery area, where your progress will be monitored. If there are no complications, you will be allowed to go home when you are awake, stable, and taking fluids well. You may have numbness around the surgical site. Healing will take some time. You will have tenderness at the surgical site and may have some swelling and bruising. You may also have some nausea.  HOME CARE INSTRUCTIONS  · Do not drive for 24  hours, or as directed by your health care provider. Do not drive while taking prescription pain medicines.  · Do not drink alcohol for 24 hours.  · Do not make important decisions or sign legal documents for 24 hours.  · You may resume a normal diet and activities as directed.  · Do not lift anything heavier than 10 pounds (4.5 kg) or play contact sports until your health care provider says it is okay.  · Change your bandages (dressings) as directed.  · Only take over-the-counter or prescription medicines as directed by your health care provider.  · Follow up with your health care provider as directed.  SEEK MEDICAL CARE IF:  · You have increased bleeding (more than a small spot) from the surgical site.  · You have redness, swelling, or increasing pain in the wound.  · You see pus coming from the wound.  · You have a fever.  · You notice a bad smell coming from the wound or dressing.  · You feel lightheaded or faint.  · You develop a rash.  · You have trouble breathing.  · You develop allergies.  MAKE SURE YOU:  · Understand these instructions.  · Will watch your condition.  · Will get help right away if you are not doing well or get worse.     This information is not intended to replace advice given to you by your health care provider. Make sure you discuss any questions you have with your health care provider.     Document Released: 09/12/2002 Document Revised: 05/03/2016 Document Reviewed: 05/22/2014  [employer] Interactive Patient Education ©2016 [employer]       Fall Prevention in Hospitals, Adult  As a hospital patient, your condition and the treatments you receive can increase your risk for falls. Some additional risk factors for falls in a hospital include:  · Being in an unfamiliar environment.  · Being on bed rest.  · Your surgery.  · Taking certain medicines.  · Your tubing requirements, such as intravenous (IV) therapy or catheters.  It is important that you learn how to decrease fall risks while at the  hospital. Below are important tips that can help prevent falls.  SAFETY TIPS FOR PREVENTING FALLS  Talk about your risk of falling.  · Ask your health care provider why you are at risk for falling. Is it your medicine, illness, tubing placement, or something else?  · Make a plan with your health care provider to keep you safe from falls.  · Ask your health care provider or pharmacist about side effects of your medicines. Some medicines can make you dizzy or affect your coordination.  Ask for help.  · Ask for help before getting out of bed. You may need to press your call button.  · Ask for assistance in getting safely to the toilet.  · Ask for a walker or cane to be put at your bedside. Ask that most of the side rails on your bed be placed up before your health care provider leaves the room.  · Ask family or friends to sit with you.  · Ask for things that are out of your reach, such as your glasses, hearing aids, telephone, bedside table, or call button.  Follow these tips to avoid falling:  · Stay lying or seated, rather than standing, while waiting for help.  · Wear rubber-soled slippers or shoes whenever you walk in the hospital.  · Avoid quick, sudden movements.  ¨ Change positions slowly.  ¨ Sit on the side of your bed before standing.  ¨ Stand up slowly and wait before you start to walk.  · Let your health care provider know if there is a spill on the floor.  · Pay careful attention to the medical equipment, electrical cords, and tubes around you.  · When you need help, use your call button by your bed or in the bathroom. Wait for one of your health care providers to help you.  · If you feel dizzy or unsure of your footing, return to bed and wait for assistance.  · Avoid being distracted by the TV, telephone, or another person in your room.  · Do not lean or support yourself on rolling objects, such as IV poles or bedside tables.     This information is not intended to replace advice given to you by your  health care provider. Make sure you discuss any questions you have with your health care provider.     Document Released: 12/15/2001 Document Revised: 01/08/2016 Document Reviewed: 08/25/2013  [employer] Interactive Patient Education ©2016 [employer]       Surgical Site Infections FAQs  What is a Surgical Site Infection (SSI)?  A surgical site infection is an infection that occurs after surgery in the part of the body where the surgery took place. Most patients who have surgery do not develop an infection. However, infections develop in about 1 to 3 out of every 100 patients who have surgery.  Some of the common symptoms of a surgical site infection are:  · Redness and pain around the area where you had surgery  · Drainage of cloudy fluid from your surgical wound  · Fever  Can SSIs be treated?  Yes. Most surgical site infections can be treated with antibiotics. The antibiotic given to you depends on the bacteria (germs) causing the infection. Sometimes patients with SSIs also need another surgery to treat the infection.  What are some of the things that hospitals are doing to prevent SSIs?  To prevent SSIs, doctors, nurses, and other healthcare providers:  · Clean their hands and arms up to their elbows with an antiseptic agent just before the surgery.  · Clean their hands with soap and water or an alcohol-based hand rub before and after caring for each patient.  · May remove some of your hair immediately before your surgery using electric clippers if the hair is in the same area where the procedure will occur. They should not shave you with a razor.  · Wear special hair covers, masks, gowns, and gloves during surgery to keep the surgery area clean.  · Give you antibiotics before your surgery starts. In most cases, you should get antibiotics within 60 minutes before the surgery starts and the antibiotics should be stopped within 24 hours after surgery.  · Clean the skin at the site of your surgery with a special  soap that kills germs.  What can I do to help prevent SSIs?  Before your surgery:  · Tell your doctor about other medical problems you may have. Health problems such as allergies, diabetes, and obesity could affect your surgery and your treatment.  · Quit smoking. Patients who smoke get more infections. Talk to your doctor about how you can quit before your surgery.  · Do not shave near where you will have surgery. Shaving with a razor can irritate your skin and make it easier to develop an infection.  At the time of your surgery:  · Speak up if someone tries to shave you with a razor before surgery. Ask why you need to be shaved and talk with your surgeon if you have any concerns.  · Ask if you will get antibiotics before surgery.  After your surgery:  · Make sure that your healthcare providers clean their hands before examining you, either with soap and water or an alcohol-based hand rub.  · If you do not see your providers clean their hands, please ask them to do so.  · Family and friends who visit you should not touch the surgical wound or dressings.  · Family and friends should clean their hands with soap and water or an alcohol-based hand rub before and after visiting you. If you do not see them clean their hands, ask them to clean their hands.  What do I need to do when I go home from the hospital?  · Before you go home, your doctor or nurse should explain everything you need to know about taking care of your wound. Make sure you understand how to care for your wound before you leave the hospital.  · Always clean your hands before and after caring for your wound.  · Before you go home, make sure you know who to contact if you have questions or problems after you get home.  · If you have any symptoms of an infection, such as redness and pain at the surgery site, drainage, or fever, call your doctor immediately.  If you have additional questions, please ask your doctor or nurse.  Developed and co-sponsored by  The Society for Healthcare Epidemiology of [place] (SHEA); Infectious Diseases Society of [place] (IDSA); American Hospital Association; Association for Professionals in Infection Control and Epidemiology (APIC); Centers for Disease Control and Prevention (CDC); and The Joint Commission.     This information is not intended to replace advice given to you by your health care provider. Make sure you discuss any questions you have with your health care provider.     Document Released: 12/23/2014 Document Revised: 01/08/2016 Document Reviewed: 03/02/2016  [employer] Interactive Patient Education ©2016 [employer]     PARENT/GUARDIAN VERBALIZES UNDERSTANDING OF ABOVE EDUCATION

## 2017-03-10 ENCOUNTER — APPOINTMENT (OUTPATIENT)
Dept: GENERAL RADIOLOGY | Facility: HOSPITAL | Age: 62
End: 2017-03-10

## 2017-03-10 ENCOUNTER — ANESTHESIA EVENT (OUTPATIENT)
Dept: PERIOP | Facility: HOSPITAL | Age: 62
End: 2017-03-10

## 2017-03-10 ENCOUNTER — HOSPITAL ENCOUNTER (OUTPATIENT)
Facility: HOSPITAL | Age: 62
Setting detail: HOSPITAL OUTPATIENT SURGERY
Discharge: HOME | End: 2017-03-10
Attending: UROLOGY | Admitting: UROLOGY

## 2017-03-10 ENCOUNTER — ANESTHESIA (OUTPATIENT)
Dept: PERIOP | Facility: HOSPITAL | Age: 62
End: 2017-03-10

## 2017-03-10 VITALS
TEMPERATURE: 98 F | OXYGEN SATURATION: 96 % | HEART RATE: 88 BPM | RESPIRATION RATE: 14 BRPM | DIASTOLIC BLOOD PRESSURE: 90 MMHG | SYSTOLIC BLOOD PRESSURE: 134 MMHG

## 2017-03-10 DIAGNOSIS — N20.1 LEFT URETERAL CALCULUS: ICD-10-CM

## 2017-03-10 LAB — BACTERIA SPEC AEROBE CULT: NORMAL

## 2017-03-10 PROCEDURE — 25010000002 MIDAZOLAM PER 1 MG: Performed by: ANESTHESIOLOGY

## 2017-03-10 PROCEDURE — 25010000002 SUCCINYLCHOLINE PER 20 MG: Performed by: NURSE ANESTHETIST, CERTIFIED REGISTERED

## 2017-03-10 PROCEDURE — 52356 CYSTO/URETERO W/LITHOTRIPSY: CPT | Performed by: UROLOGY

## 2017-03-10 PROCEDURE — 0 IOPAMIDOL 61 % SOLUTION: Performed by: UROLOGY

## 2017-03-10 PROCEDURE — 25010000002 ONDANSETRON PER 1 MG: Performed by: NURSE ANESTHETIST, CERTIFIED REGISTERED

## 2017-03-10 PROCEDURE — 82360 CALCULUS ASSAY QUANT: CPT | Performed by: UROLOGY

## 2017-03-10 PROCEDURE — C1758 CATHETER, URETERAL: HCPCS | Performed by: UROLOGY

## 2017-03-10 PROCEDURE — 25010000002 PROPOFOL 10 MG/ML EMULSION: Performed by: NURSE ANESTHETIST, CERTIFIED REGISTERED

## 2017-03-10 PROCEDURE — 25010000003 CEFAZOLIN PER 500 MG: Performed by: UROLOGY

## 2017-03-10 PROCEDURE — 88300 SURGICAL PATH GROSS: CPT | Performed by: UROLOGY

## 2017-03-10 PROCEDURE — 25010000002 DEXAMETHASONE PER 1 MG: Performed by: NURSE ANESTHETIST, CERTIFIED REGISTERED

## 2017-03-10 PROCEDURE — C1769 GUIDE WIRE: HCPCS | Performed by: UROLOGY

## 2017-03-10 PROCEDURE — 25010000002 FENTANYL CITRATE (PF) 250 MCG/5ML SOLUTION: Performed by: NURSE ANESTHETIST, CERTIFIED REGISTERED

## 2017-03-10 PROCEDURE — C2617 STENT, NON-COR, TEM W/O DEL: HCPCS | Performed by: UROLOGY

## 2017-03-10 PROCEDURE — 52352 CYSTOURETERO W/STONE REMOVE: CPT | Performed by: UROLOGY

## 2017-03-10 PROCEDURE — C1726 CATH, BAL DIL, NON-VASCULAR: HCPCS | Performed by: UROLOGY

## 2017-03-10 PROCEDURE — 74420 UROGRAPHY RTRGR +-KUB: CPT

## 2017-03-10 DEVICE — URETERAL STENT
Type: IMPLANTABLE DEVICE | Site: URETER | Status: FUNCTIONAL
Brand: PERCUFLEX™ PLUS

## 2017-03-10 RX ORDER — SODIUM CHLORIDE, SODIUM LACTATE, POTASSIUM CHLORIDE, CALCIUM CHLORIDE 600; 310; 30; 20 MG/100ML; MG/100ML; MG/100ML; MG/100ML
100 INJECTION, SOLUTION INTRAVENOUS CONTINUOUS
Status: DISCONTINUED | OUTPATIENT
Start: 2017-03-10 | End: 2017-03-10 | Stop reason: HOSPADM

## 2017-03-10 RX ORDER — MIDAZOLAM HYDROCHLORIDE 1 MG/ML
1 INJECTION INTRAMUSCULAR; INTRAVENOUS
Status: DISCONTINUED | OUTPATIENT
Start: 2017-03-10 | End: 2017-03-10 | Stop reason: HOSPADM

## 2017-03-10 RX ORDER — SODIUM CHLORIDE 0.9 % (FLUSH) 0.9 %
1-10 SYRINGE (ML) INJECTION AS NEEDED
Status: DISCONTINUED | OUTPATIENT
Start: 2017-03-10 | End: 2017-03-10 | Stop reason: HOSPADM

## 2017-03-10 RX ORDER — MEPERIDINE HYDROCHLORIDE 25 MG/ML
12.5 INJECTION INTRAMUSCULAR; INTRAVENOUS; SUBCUTANEOUS
Status: DISCONTINUED | OUTPATIENT
Start: 2017-03-10 | End: 2017-03-10 | Stop reason: HOSPADM

## 2017-03-10 RX ORDER — LIDOCAINE HYDROCHLORIDE 20 MG/ML
INJECTION, SOLUTION INFILTRATION; PERINEURAL AS NEEDED
Status: DISCONTINUED | OUTPATIENT
Start: 2017-03-10 | End: 2017-03-10 | Stop reason: SURG

## 2017-03-10 RX ORDER — FENTANYL CITRATE 50 UG/ML
INJECTION, SOLUTION INTRAMUSCULAR; INTRAVENOUS AS NEEDED
Status: DISCONTINUED | OUTPATIENT
Start: 2017-03-10 | End: 2017-03-10 | Stop reason: SURG

## 2017-03-10 RX ORDER — ONDANSETRON 2 MG/ML
INJECTION INTRAMUSCULAR; INTRAVENOUS AS NEEDED
Status: DISCONTINUED | OUTPATIENT
Start: 2017-03-10 | End: 2017-03-10 | Stop reason: SURG

## 2017-03-10 RX ORDER — NALOXONE HCL 0.4 MG/ML
0.04 VIAL (ML) INJECTION AS NEEDED
Status: DISCONTINUED | OUTPATIENT
Start: 2017-03-10 | End: 2017-03-10 | Stop reason: HOSPADM

## 2017-03-10 RX ORDER — FLUMAZENIL 0.1 MG/ML
0.2 INJECTION INTRAVENOUS AS NEEDED
Status: DISCONTINUED | OUTPATIENT
Start: 2017-03-10 | End: 2017-03-10 | Stop reason: HOSPADM

## 2017-03-10 RX ORDER — IPRATROPIUM BROMIDE AND ALBUTEROL SULFATE 2.5; .5 MG/3ML; MG/3ML
3 SOLUTION RESPIRATORY (INHALATION) ONCE AS NEEDED
Status: DISCONTINUED | OUTPATIENT
Start: 2017-03-10 | End: 2017-03-10 | Stop reason: HOSPADM

## 2017-03-10 RX ORDER — MORPHINE SULFATE 2 MG/ML
2 INJECTION, SOLUTION INTRAMUSCULAR; INTRAVENOUS AS NEEDED
Status: DISCONTINUED | OUTPATIENT
Start: 2017-03-10 | End: 2017-03-10 | Stop reason: HOSPADM

## 2017-03-10 RX ORDER — IPRATROPIUM BROMIDE AND ALBUTEROL SULFATE 2.5; .5 MG/3ML; MG/3ML
3 SOLUTION RESPIRATORY (INHALATION) ONCE
Status: COMPLETED | OUTPATIENT
Start: 2017-03-10 | End: 2017-03-10

## 2017-03-10 RX ORDER — HYDROCODONE BITARTRATE AND ACETAMINOPHEN 7.5; 325 MG/1; MG/1
1-2 TABLET ORAL EVERY 4 HOURS PRN
Qty: 20 TABLET | Refills: 0 | Status: SHIPPED | OUTPATIENT
Start: 2017-03-10

## 2017-03-10 RX ORDER — ROCURONIUM BROMIDE 10 MG/ML
INJECTION, SOLUTION INTRAVENOUS AS NEEDED
Status: DISCONTINUED | OUTPATIENT
Start: 2017-03-10 | End: 2017-03-10 | Stop reason: SURG

## 2017-03-10 RX ORDER — MIDAZOLAM HYDROCHLORIDE 1 MG/ML
2 INJECTION INTRAMUSCULAR; INTRAVENOUS
Status: DISCONTINUED | OUTPATIENT
Start: 2017-03-10 | End: 2017-03-10 | Stop reason: HOSPADM

## 2017-03-10 RX ORDER — METOCLOPRAMIDE HYDROCHLORIDE 5 MG/ML
5 INJECTION INTRAMUSCULAR; INTRAVENOUS
Status: DISCONTINUED | OUTPATIENT
Start: 2017-03-10 | End: 2017-03-10 | Stop reason: HOSPADM

## 2017-03-10 RX ORDER — CEPHALEXIN 500 MG/1
500 CAPSULE ORAL 2 TIMES DAILY
Qty: 6 CAPSULE | Refills: 0 | Status: SHIPPED | OUTPATIENT
Start: 2017-03-10 | End: 2017-03-13

## 2017-03-10 RX ORDER — DEXAMETHASONE SODIUM PHOSPHATE 4 MG/ML
INJECTION, SOLUTION INTRA-ARTICULAR; INTRALESIONAL; INTRAMUSCULAR; INTRAVENOUS; SOFT TISSUE AS NEEDED
Status: DISCONTINUED | OUTPATIENT
Start: 2017-03-10 | End: 2017-03-10 | Stop reason: SURG

## 2017-03-10 RX ORDER — MAGNESIUM HYDROXIDE 1200 MG/15ML
LIQUID ORAL AS NEEDED
Status: DISCONTINUED | OUTPATIENT
Start: 2017-03-10 | End: 2017-03-10 | Stop reason: HOSPADM

## 2017-03-10 RX ORDER — LABETALOL HYDROCHLORIDE 5 MG/ML
5 INJECTION, SOLUTION INTRAVENOUS
Status: DISCONTINUED | OUTPATIENT
Start: 2017-03-10 | End: 2017-03-10 | Stop reason: HOSPADM

## 2017-03-10 RX ORDER — ONDANSETRON 2 MG/ML
4 INJECTION INTRAMUSCULAR; INTRAVENOUS AS NEEDED
Status: DISCONTINUED | OUTPATIENT
Start: 2017-03-10 | End: 2017-03-10 | Stop reason: HOSPADM

## 2017-03-10 RX ORDER — HYDRALAZINE HYDROCHLORIDE 20 MG/ML
5 INJECTION INTRAMUSCULAR; INTRAVENOUS
Status: DISCONTINUED | OUTPATIENT
Start: 2017-03-10 | End: 2017-03-10 | Stop reason: HOSPADM

## 2017-03-10 RX ORDER — HYDROCODONE BITARTRATE AND ACETAMINOPHEN 7.5; 325 MG/1; MG/1
1 TABLET ORAL ONCE AS NEEDED
Status: DISCONTINUED | OUTPATIENT
Start: 2017-03-10 | End: 2017-03-10 | Stop reason: HOSPADM

## 2017-03-10 RX ORDER — PROPOFOL 10 MG/ML
VIAL (ML) INTRAVENOUS AS NEEDED
Status: DISCONTINUED | OUTPATIENT
Start: 2017-03-10 | End: 2017-03-10 | Stop reason: SURG

## 2017-03-10 RX ORDER — LIDOCAINE HYDROCHLORIDE 40 MG/ML
SOLUTION TOPICAL AS NEEDED
Status: DISCONTINUED | OUTPATIENT
Start: 2017-03-10 | End: 2017-03-10 | Stop reason: SURG

## 2017-03-10 RX ORDER — SUCCINYLCHOLINE CHLORIDE 20 MG/ML
INJECTION INTRAMUSCULAR; INTRAVENOUS AS NEEDED
Status: DISCONTINUED | OUTPATIENT
Start: 2017-03-10 | End: 2017-03-10 | Stop reason: SURG

## 2017-03-10 RX ADMIN — SUCCINYLCHOLINE CHLORIDE 160 MG: 20 INJECTION, SOLUTION INTRAMUSCULAR; INTRAVENOUS at 08:59

## 2017-03-10 RX ADMIN — ONDANSETRON HYDROCHLORIDE 4 MG: 2 SOLUTION INTRAMUSCULAR; INTRAVENOUS at 09:20

## 2017-03-10 RX ADMIN — DEXAMETHASONE SODIUM PHOSPHATE 4 MG: 4 INJECTION, SOLUTION INTRAMUSCULAR; INTRAVENOUS at 09:20

## 2017-03-10 RX ADMIN — ROCURONIUM BROMIDE 5 MG: 10 INJECTION INTRAVENOUS at 08:59

## 2017-03-10 RX ADMIN — LIDOCAINE HYDROCHLORIDE 80 MG: 20 INJECTION, SOLUTION INFILTRATION; PERINEURAL at 08:59

## 2017-03-10 RX ADMIN — FENTANYL CITRATE 50 MCG: 50 INJECTION INTRAMUSCULAR; INTRAVENOUS at 09:41

## 2017-03-10 RX ADMIN — LIDOCAINE HYDROCHLORIDE 1 EACH: 40 SOLUTION TOPICAL at 08:59

## 2017-03-10 RX ADMIN — LIDOCAINE HYDROCHLORIDE 0.5 ML: 10 INJECTION, SOLUTION EPIDURAL; INFILTRATION; INTRACAUDAL; PERINEURAL at 08:48

## 2017-03-10 RX ADMIN — FENTANYL CITRATE 50 MCG: 50 INJECTION INTRAMUSCULAR; INTRAVENOUS at 09:47

## 2017-03-10 RX ADMIN — CEFAZOLIN SODIUM 2 G: 2 SOLUTION INTRAVENOUS at 09:08

## 2017-03-10 RX ADMIN — FENTANYL CITRATE 50 MCG: 50 INJECTION INTRAMUSCULAR; INTRAVENOUS at 09:35

## 2017-03-10 RX ADMIN — FENTANYL CITRATE 100 MCG: 50 INJECTION INTRAMUSCULAR; INTRAVENOUS at 08:59

## 2017-03-10 RX ADMIN — IPRATROPIUM BROMIDE AND ALBUTEROL SULFATE 3 ML: .5; 3 SOLUTION RESPIRATORY (INHALATION) at 08:48

## 2017-03-10 RX ADMIN — MIDAZOLAM HYDROCHLORIDE 2 MG: 1 INJECTION, SOLUTION INTRAMUSCULAR; INTRAVENOUS at 08:48

## 2017-03-10 RX ADMIN — SODIUM CHLORIDE, POTASSIUM CHLORIDE, SODIUM LACTATE AND CALCIUM CHLORIDE 100 ML/HR: 600; 310; 30; 20 INJECTION, SOLUTION INTRAVENOUS at 08:48

## 2017-03-10 RX ADMIN — PROPOFOL 180 MG: 10 INJECTION, EMULSION INTRAVENOUS at 08:59

## 2017-03-10 NOTE — DISCHARGE INSTRUCTIONS

## 2017-03-10 NOTE — ANESTHESIA PROCEDURE NOTES
Airway  Urgency: elective    Date/Time: 3/10/2017 9:00 AM  Airway not difficult    General Information and Staff    Patient location during procedure: OR    Indications and Patient Condition  Indications for airway management: airway protection    Preoxygenated: yes  Mask difficulty assessment: 1 - vent by mask    Final Airway Details  Final airway type: endotracheal airway      Successful airway: ETT    Successful intubation technique: direct laryngoscopy  Blade: Deric  Blade size: #3  ETT size: 8.0 mm  Cormack-Lehane Classification: grade I - full view of glottis  Placement verified by: chest auscultation and capnometry   Measured from: lips  ETT to lips (cm): 22  Number of attempts at approach: 1    Additional Comments  Lower right incisor noted to be loose prior to intubation. Dentition unchanged after intubation.

## 2017-03-10 NOTE — ANESTHESIA POSTPROCEDURE EVALUATION
Patient: Porfirio Gates    Procedure Summary     Date Anesthesia Start Anesthesia Stop Room / Location    03/10/17 0855 0947  [facility] OR 01 / [facility]       Procedure Diagnosis Surgeon Provider    CYSTOSCOPY URETEROSCOPY LASER LITHOTRIPSY (Left Ureter); CYSTOSCOPY URETERAL CATHETER/STENT INSERTION LEFT URETER (Left Bladder) Left ureteral calculus  (Left ureteral calculus [N20.1]) MD Faizan Mensah CRNA          Anesthesia Type: general  Last vitals  /72 (03/10/17 1005)    Temp 97.8 °F (36.6 °C) (03/10/17 0948)    Pulse 81 (03/10/17 1005)   Resp 16 (03/10/17 1005)    SpO2 96 % (03/10/17 1005)      Post Anesthesia Care and Evaluation    Patient location during evaluation: PACU  Patient participation: complete - patient participated  Level of consciousness: awake and awake and alert  Pain score: 0  Pain management: adequate  Airway patency: patent  Anesthetic complications: No anesthetic complications    Cardiovascular status: acceptable and stable  Respiratory status: acceptable and unassisted  Hydration status: acceptable

## 2017-03-10 NOTE — OP NOTE
Operative Summary    Porfirio Gates  Date of Procedure: 3/10/2017    Pre-op Diagnosis:   Left ureteral calculus [N20.1]    Post-op Diagnosis:     Post-Op Diagnosis Codes:     * Left ureteral calculus [N20.1]    Procedure/CPT® Codes:      Procedure(s):  CYSTOSCOPY URETEROSCOPY LASER LITHOTRIPSY  CYSTOSCOPY URETERAL CATHETER/STENT INSERTION LEFT URETER    Surgeon(s):  Neftali Pereira MD    Anesthesia: General    Staff:   Circulator: Zaida Downey RN; Stephen Canela RN  Scrub Person: Jhno Newman; Sim Tate    Indications for procedure:  I have a stone    Procedure details:  The patient is identified in the preoperative holding area. Informed consent process had been obtained In the office  and the patient has a good recollection of that discussion. No additional questions were voiced.     The patient is taken to the operating room suite and placed on the cystoscopy table. Effective general anesthesia is given and the patient is placed in the dorsal lithotomy position. Joaquim stirrups are used to support the legs with attention being paid to their postioning to avoid compression on the peroneal nerve or other pressure points.. The usual prep and drape is carried out with Betadine. At this point the appropriate timeout protocol was observed.     A 22 Danish cystoscope sheath with a 30° lens is inserted.  On introduction of the cystoscope findings include A normal appearing urethra.  The prostatic urethra shows a nonobstructive prostate.  The bladder is without evidence of mucosal lesion glomerulation or mass.  The ureteral orificesare orthotopic in position..     The left ureteral orifice is identified.  A 5 Danish open-ended ureteral catheter is used to do a retrograde ureteropyelogram with half-strength contrast.  The stone is seen in the distal ureter.  There is moderate hydronephrosis noted.    A 0.038 mm Sensor guidewires passed under direct and fluoroscopic vision up into the left renal pelvis.   The orifice appeared to be quite narrowed so I passed a 5 Kazakh by 15 mm dilating balloon and inflated this with contrast under fluoroscopic vision to confirm dilatation of the intramural ureter.  I then introduced a 7.2 Kazakh ureteroscope and negotiated this where the stone is located.  A 200 µ holmium laser fiber is used with holmium laser settings of 0.8 J at a repetition rate of 10 to fragment the stone into several pieces which were each removed with the 0 tip basket.  At the conclusion I took the ureteroscope all the way up to the L3 level of the proximal ureter and saw no other stones.  After the ureteroscope was removed and a stent was placed.     The wire is then back loaded through the cystoscope and the orifice visualized with the wire appropriately positioned. I passed a ureteral stent over the guidewire into the right renal pelvis and pulled the wire back seeing a good curl in the renal pelvis on fluoroscopy. The wire is removed in its entirety after the string is removed and I could see a good curl of the stent in the bladder. The bladder is then drained. The patient tolerated the procedure without difficulty.      Patient is now transferred to recovery room in stable condition.    Estimated Blood Loss: 0 mL    Specimens:                  ID Type Source Tests Collected by Time Destination   A : LEFT URETERAL CALCULUS Calculus Ureter, Left TISSUE EXAM Neftali Pereira MD 3/10/2017 0938          Drains:    6 Slovak x 24 cm JJ stent       Complications: none    Plan: Remove the ureteral stent on 320 17.  6 weeks after this we will do a renal ultrasound.  The patient will have a metabolic evaluation that includes 24-hour urine to see if he has a stone diathesis.    Neftali Pereira MD     Date: 3/10/2017  Time: 10:09 AM

## 2017-03-10 NOTE — PLAN OF CARE
Problem: Patient Care Overview (Adult)  Goal: Plan of Care Review  Outcome: Outcome(s) achieved Date Met:  03/10/17    03/10/17 1103   Coping/Psychosocial Response Interventions   Plan Of Care Reviewed With patient;family   Patient Care Overview   Progress improving   Outcome Evaluation   Outcome Summary/Follow up Plan pt meets discharge criteria. pt ready for discharge.          Problem: Perioperative Period (Adult)  Goal: Signs and Symptoms of Listed Potential Problems Will be Absent or Manageable (Perioperative Period)  Outcome: Outcome(s) achieved Date Met:  03/10/17

## 2017-03-10 NOTE — ANESTHESIA PREPROCEDURE EVALUATION
Anesthesia Evaluation     Patient summary reviewed and Nursing notes reviewed   no history of anesthetic complications:  NPO Status: > 8 hours   Airway   Mallampati: I  TM distance: >3 FB  Neck ROM: full  no difficulty expected  Dental    (+) upper dentures    Pulmonary - normal exam   (+) a smoker (1/3 ppd, yes today),   (-) asthma, sleep apnea  Cardiovascular - normal exam  Exercise tolerance: good (4-7 METS)    ECG reviewed    (-) hypertension, past MI      Neuro/Psych  (-) seizures, TIA, CVA  GI/Hepatic/Renal/Endo    (+)  chronic renal disease stones,   (-) liver disease, diabetes    Musculoskeletal     Abdominal    Substance History      OB/GYN          Other                                    Anesthesia Plan    ASA 2     general     intravenous induction   Anesthetic plan and risks discussed with patient.

## 2017-03-20 ENCOUNTER — PROCEDURE VISIT (OUTPATIENT)
Dept: UROLOGY | Facility: CLINIC | Age: 62
End: 2017-03-20

## 2017-03-20 DIAGNOSIS — N20.1 LEFT URETERAL CALCULUS: Primary | ICD-10-CM

## 2017-03-20 LAB
BILIRUB BLD-MCNC: NEGATIVE MG/DL
CLARITY, POC: CLEAR
COLOR UR: YELLOW
GLUCOSE UR STRIP-MCNC: NEGATIVE MG/DL
KETONES UR QL: NEGATIVE
LEUKOCYTE EST, POC: ABNORMAL
NITRITE UR-MCNC: NEGATIVE MG/ML
PH UR: 5 [PH] (ref 5–8)
PROT UR STRIP-MCNC: ABNORMAL MG/DL
RBC # UR STRIP: ABNORMAL /UL
SP GR UR: 1.03 (ref 1–1.03)
UROBILINOGEN UR QL: NORMAL

## 2017-03-20 PROCEDURE — 52310 CYSTOSCOPY AND TREATMENT: CPT | Performed by: UROLOGY

## 2017-03-20 PROCEDURE — 81003 URINALYSIS AUTO W/O SCOPE: CPT | Performed by: UROLOGY

## 2017-03-20 NOTE — PROGRESS NOTES
CC: I am here for the doctor to look at my bladder and get this stent out.     Cystoscopy with stent removal    Indications: Status post ureteroscopy    Prep: Hibiclens solution    Instrumentation:Flexible cystoscope and Alligator grasping forceps    Procedure: After lidocaine jelly is instilled into the urethra for 10 minutes, the well-lubricated cystoscope was introduced through the urethra into the bladder.  The stent is seen protruding from the left side.  The alligator forceps or used to grasp the stent and pull it out the urethra.  The patient tolerated the procedure well.    Diagnoses and all orders for this visit:    Left ureteral calculus  -     POC Urinalysis Dipstick, Automated  -     lidocaine (XYLOCAINE) 2 % jelly; Apply  topically As Needed for Mild Pain (1-3).        Follow up:    Routine follow up

## 2017-03-30 LAB
LAB AP CASE REPORT: NORMAL
LAB AP CLINICAL INFORMATION: NORMAL
Lab: NORMAL
PATH REPORT.FINAL DX SPEC: NORMAL
PATH REPORT.GROSS SPEC: NORMAL

## 2017-05-05 ENCOUNTER — OFFICE VISIT (OUTPATIENT)
Dept: UROLOGY | Facility: CLINIC | Age: 62
End: 2017-05-05

## 2017-05-05 ENCOUNTER — HOSPITAL ENCOUNTER (OUTPATIENT)
Dept: ULTRASOUND IMAGING | Facility: HOSPITAL | Age: 62
Discharge: HOME | End: 2017-05-05
Attending: UROLOGY | Admitting: UROLOGY

## 2017-05-05 VITALS
HEIGHT: 71 IN | BODY MASS INDEX: 30.8 KG/M2 | WEIGHT: 220 LBS | DIASTOLIC BLOOD PRESSURE: 80 MMHG | TEMPERATURE: 97.7 F | SYSTOLIC BLOOD PRESSURE: 140 MMHG

## 2017-05-05 DIAGNOSIS — N20.0 RENAL CALCULUS: ICD-10-CM

## 2017-05-05 DIAGNOSIS — N28.1 RENAL CYST, LEFT: ICD-10-CM

## 2017-05-05 DIAGNOSIS — N20.1 LEFT URETERAL CALCULUS: ICD-10-CM

## 2017-05-05 DIAGNOSIS — N20.1 LEFT URETERAL CALCULUS: Primary | ICD-10-CM

## 2017-05-05 LAB
BILIRUB BLD-MCNC: NEGATIVE MG/DL
CLARITY, POC: CLEAR
COLOR UR: YELLOW
GLUCOSE UR STRIP-MCNC: NEGATIVE MG/DL
KETONES UR QL: NEGATIVE
LEUKOCYTE EST, POC: NEGATIVE
NITRITE UR-MCNC: NEGATIVE MG/ML
PH UR: 6 [PH] (ref 5–8)
PROT UR STRIP-MCNC: NEGATIVE MG/DL
RBC # UR STRIP: NEGATIVE /UL
SP GR UR: 1.03 (ref 1–1.03)
UROBILINOGEN UR QL: NORMAL

## 2017-05-05 PROCEDURE — 81003 URINALYSIS AUTO W/O SCOPE: CPT | Performed by: UROLOGY

## 2017-05-05 PROCEDURE — 76775 US EXAM ABDO BACK WALL LIM: CPT

## 2017-05-05 PROCEDURE — 99214 OFFICE O/P EST MOD 30 MIN: CPT | Performed by: UROLOGY

## 2018-09-18 ENCOUNTER — HOSPITAL ENCOUNTER (EMERGENCY)
Facility: HOSPITAL | Age: 63
Discharge: HOME | End: 2018-09-18
Attending: EMERGENCY MEDICINE | Admitting: EMERGENCY MEDICINE

## 2018-09-18 VITALS
TEMPERATURE: 98.8 F | HEART RATE: 108 BPM | SYSTOLIC BLOOD PRESSURE: 161 MMHG | BODY MASS INDEX: 32.9 KG/M2 | RESPIRATION RATE: 16 BRPM | OXYGEN SATURATION: 98 % | DIASTOLIC BLOOD PRESSURE: 88 MMHG | HEIGHT: 71 IN | WEIGHT: 235 LBS

## 2018-09-18 DIAGNOSIS — S41.112A LACERATION OF LEFT UPPER EXTREMITY, INITIAL ENCOUNTER: ICD-10-CM
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
## 2018-09-18 PROCEDURE — 99284 EMERGENCY DEPT VISIT MOD MDM: CPT

## 2018-09-18 PROCEDURE — 25010000002 TDAP 5-2.5-18.5 LF-MCG/0.5 SUSPENSION: Performed by: EMERGENCY MEDICINE

## 2018-09-18 PROCEDURE — 90471 IMMUNIZATION ADMIN: CPT | Performed by: EMERGENCY MEDICINE

## 2018-09-18 PROCEDURE — 90715 TDAP VACCINE 7 YRS/> IM: CPT | Performed by: EMERGENCY MEDICINE

## 2018-09-18 RX ORDER — HYDROCODONE BITARTRATE AND ACETAMINOPHEN 7.5; 325 MG/1; MG/1
1 TABLET ORAL ONCE
Status: COMPLETED | OUTPATIENT
Start: 2018-09-18 | End: 2018-09-18

## 2018-09-18 RX ORDER — CEPHALEXIN 500 MG/1
500 CAPSULE ORAL 2 TIMES DAILY
Qty: 10 CAPSULE | Refills: 0 | Status: SHIPPED | OUTPATIENT
Start: 2018-09-18

## 2018-09-18 RX ORDER — LIDOCAINE HYDROCHLORIDE 10 MG/ML
10 INJECTION, SOLUTION INFILTRATION; PERINEURAL ONCE
Status: COMPLETED | OUTPATIENT
Start: 2018-09-18 | End: 2018-09-18

## 2018-09-18 RX ADMIN — HYDROCODONE BITARTRATE AND ACETAMINOPHEN 1 TABLET: 7.5; 325 TABLET ORAL at 05:09

## 2018-09-18 RX ADMIN — LIDOCAINE HYDROCHLORIDE 10 ML: 10 INJECTION, SOLUTION INFILTRATION; PERINEURAL at 05:56

## 2018-09-18 RX ADMIN — TETANUS TOXOID, REDUCED DIPHTHERIA TOXOID AND ACELLULAR PERTUSSIS VACCINE, ADSORBED 0.5 ML: 5; 2.5; 8; 8; 2.5 SUSPENSION INTRAMUSCULAR at 05:09
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
## 2019-01-14 PROCEDURE — 88302 TISSUE EXAM BY PATHOLOGIST: CPT | Performed by: GENERAL PRACTICE

## 2019-01-15 ENCOUNTER — LAB REQUISITION (OUTPATIENT)
Dept: LAB | Facility: HOSPITAL | Age: 64
End: 2019-01-15

## 2019-01-15 DIAGNOSIS — Z00.00 ENCOUNTER FOR GENERAL ADULT MEDICAL EXAMINATION WITHOUT ABNORMAL FINDINGS: ICD-10-CM

## 2019-01-17 LAB
CYTO UR: NORMAL
LAB AP CASE REPORT: NORMAL
LAB AP CLINICAL INFORMATION: NORMAL
PATH REPORT.FINAL DX SPEC: NORMAL
PATH REPORT.GROSS SPEC: NORMAL

## 2023-07-12 PROBLEM — R93.1 ABNORMAL CARDIAC CT ANGIOGRAPHY: Status: ACTIVE | Noted: 2023-07-12

## 2023-08-08 ENCOUNTER — TELEPHONE (OUTPATIENT)
Dept: CARDIOLOGY | Facility: CLINIC | Age: 68
End: 2023-08-08
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
## 2023-08-30 ENCOUNTER — OFFICE VISIT (OUTPATIENT)
Dept: OTOLARYNGOLOGY | Facility: CLINIC | Age: 68
End: 2023-08-30
Payer: COMMERCIAL

## 2023-08-30 VITALS — TEMPERATURE: 97.2 F | DIASTOLIC BLOOD PRESSURE: 83 MMHG | SYSTOLIC BLOOD PRESSURE: 123 MMHG | HEART RATE: 90 BPM

## 2023-08-30 DIAGNOSIS — K11.8 MASS OF PAROTID GLAND: Primary | ICD-10-CM

## 2023-08-30 DIAGNOSIS — Z72.0 TOBACCO ABUSE: ICD-10-CM

## 2023-08-30 RX ORDER — SEMAGLUTIDE 0.68 MG/ML
INJECTION, SOLUTION SUBCUTANEOUS
COMMUNITY
Start: 2023-08-09

## 2023-08-30 RX ORDER — LISINOPRIL 10 MG/1
TABLET ORAL
COMMUNITY
Start: 2023-08-09

## 2023-08-30 NOTE — PROGRESS NOTES
PRIMARY CARE PROVIDER: Endy Ortega MD  REFERRING PROVIDER: No ref. provider found    Chief Complaint   Patient presents with    Mass     Mass to left of neck came up about 6 weeks ago.  Pt has a hx of mass to same area       Subjective   History of Present Illness:  Porfirio Gates is a  67 y.o. male who presents with a neck mass.  Over the past 6 weeks, he has noted an enlarging fullness in his left neck.  This was slightly worse, but he antibiotic and it has decreased in size a little.  He denies any significant pain in the area.  He does have some cervical spine issues with 2 herniated disks, and holds his head somewhat to the right.  15 or 20 years ago, he was seen by Dr. Bernardo with a mass in the same as placed on his neck.  He reports the needle aspiration was performed, and he was told this was benign.  The plan on a nonoperative, observational course.  He does smoke cigarettes.  He is also short of breath, and is scheduled for a cardiac cath on September 6 with Dr. Peterson.    Review of Systems:  Review of Systems   Constitutional:  Negative for chills, fatigue, fever and unexpected weight change (Lost 6-7# - On Ozempic for 1 year).   HENT:  Positive for dental problem, facial swelling and hearing loss. Negative for sore throat, trouble swallowing and voice change.    Respiratory:  Negative for cough, chest tightness and shortness of breath.    Cardiovascular:  Negative for chest pain.   Musculoskeletal:  Positive for neck pain.   Skin:  Negative for color change.   Neurological:  Negative for facial asymmetry.   Hematological:  Negative for adenopathy. Does not bruise/bleed easily.     Past History:  Past Medical History:   Diagnosis Date    Kidney stones      Past Surgical History:   Procedure Laterality Date    ADENOIDECTOMY      CHOLECYSTECTOMY      CYSTOSCOPY URETEROSCOPY LASER LITHOTRIPSY Left 3/10/2017    Procedure: CYSTOSCOPY URETEROSCOPY LASER LITHOTRIPSY;  Surgeon: Neftali Pereira MD;   Location:  [facility] OR;  Service:     CYSTOSCOPY W/ URETERAL STENT PLACEMENT Left 3/10/2017    Procedure: CYSTOSCOPY URETERAL CATHETER/STENT INSERTION LEFT URETER;  Surgeon: Neftali Pereira MD;  Location:  [facility] OR;  Service:      History reviewed. No pertinent family history.  Social History     Tobacco Use    Smoking status: Every Day     Packs/day: 0.50     Types: Cigarettes   Substance Use Topics    Alcohol use: No    Drug use: No     Allergies:  Patient has no known allergies.    Current Outpatient Medications:     ondansetron ODT (ZOFRAN-ODT) 4 MG disintegrating tablet, Take 1 tablet by mouth 4 (Four) Times a Day As Needed for Nausea or Vomiting for up to 20 doses., Disp: 20 tablet, Rfl: 0    tamsulosin (FLOMAX) 0.4 MG capsule 24 hr capsule, Take 1 capsule by mouth Daily., Disp: 30 capsule, Rfl: 0    HYDROcodone-acetaminophen (NORCO) 7.5-325 MG per tablet, Take 1-2 tablets by mouth Every 4 (Four) Hours As Needed for moderate pain (4-6) (Pain). (Patient not taking: Reported on 8/30/2023), Disp: 20 tablet, Rfl: 0    ketorolac (TORADOL) 10 MG tablet, Take 1 tablet by mouth Every 6 (Six) Hours As Needed for moderate pain (4-6) for up to 10 doses. (Patient not taking: Reported on 8/30/2023), Disp: 10 tablet, Rfl: 0    lisinopril (PRINIVIL,ZESTRIL) 10 MG tablet, , Disp: , Rfl:     Ozempic, 0.25 or 0.5 MG/DOSE, 2 MG/3ML solution pen-injector, , Disp: , Rfl:     Current Facility-Administered Medications:     lidocaine (XYLOCAINE) 2 % jelly, , Topical, PRN, Neftali Pereira MD      Objective     Vital Signs:  Temp:  [97.2 øF (36.2 øC)] 97.2 øF (36.2 øC)  Heart Rate:  [90] 90  BP: (123)/(83) 123/83    Physical Exam:  Physical Exam  Vitals and nursing note reviewed.   Constitutional:       General: He is not in acute distress.     Appearance: He is well-developed. He is not diaphoretic.   HENT:      Head: Normocephalic and atraumatic.        Right Ear: External ear normal. Decreased hearing noted.      Left Ear: External  "ear normal. Decreased hearing noted.      Nose: Nose normal. No septal deviation.      Mouth/Throat:      Lips: Pink.      Mouth: Mucous membranes are moist. No oral lesions.      Dentition: Has dentures (Upper denture - lower partial).      Tongue: No lesions.     Eyes:      General: No scleral icterus.        Right eye: No discharge.         Left eye: No discharge.      Conjunctiva/sclera: Conjunctivae normal.      Pupils: Pupils are equal, round, and reactive to light.   Neck:      Thyroid: No thyromegaly.      Vascular: No JVD.      Trachea: No tracheal deviation.   Pulmonary:      Effort: Pulmonary effort is normal.      Breath sounds: No stridor.   Musculoskeletal:         General: No deformity. Normal range of motion.      Cervical back: Normal range of motion and neck supple.   Lymphadenopathy:      Cervical: No cervical adenopathy.   Skin:     General: Skin is warm and dry.      Coloration: Skin is not pale.      Findings: No erythema or rash.   Neurological:      Mental Status: He is alert and oriented to person, place, and time.      Cranial Nerves: No cranial nerve deficit.      Coordination: Coordination normal.   Psychiatric:         Speech: Speech normal.         Behavior: Behavior normal. Behavior is cooperative.         Thought Content: Thought content normal.         Judgment: Judgment normal.       Results Review:       I have personally reviewed the CT scan images on disc.  There appears to be a 4 cm mass within the left parotid gland.  I was unable to appreciate the cervical lymphadenopathy.            Assessment   Assessment:  1. Mass of parotid gland    2. Tobacco abuse        Plan   Plan:  I discussed with Mr. Gates and his daughter, Charity, that I am concerned with the growth of the mass.  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  I am concerned that with a \"benign\" fine-needle aspiration 20 years ago, and recent growth that this could represent a carcinoma ex pleomorphic adenoma.  The " top 3 in my differential diagnosis include Warthin's tumor, pleomorphic adenoma, carcinoma ex pleomorphic adenoma.  I discussed that I would like to proceed with an ultrasound-guided fine-needle aspiration to see if this would provide any information on the subtype of the tumor that this is.  We will then see him back, and we will discuss the neck steps in treatment.      PAROTIDECTOMY: The risks and benefits and alternatives of parotidectomy were explained including but not limited to bleeding, infection, risks of the general anesthesia, pain, partial or total facial nerve injury, either temporary or permanent, Nasra's syndrome or so-called gustatory sweating. Operative possibilities including total parotidectomy with or without neck dissection were discussed. Possibilities for delayed need for total parotidectomy and or neck dissection pending pathologic diagnosis were also discussed. The patient understood these risks. Questions were asked appropriately answered. No guarantees were made or implied        QUALITY MEASURES    Tobacco Use: Screening and Cessation Intervention  Social History    Tobacco Use      Smoking status: Every Day        Packs/day: 0.50        Types: Cigarettes      Smokeless tobacco: Not on file    [unfilled]      My findings and recommendations were discussed and questions were answered.     Jenaro Hudson MD  08/30/23  13:36 CDT

## 2023-09-05 ENCOUNTER — HOSPITAL ENCOUNTER (OUTPATIENT)
Dept: ULTRASOUND IMAGING | Facility: HOSPITAL | Age: 68
Discharge: HOME | End: 2023-09-05
Payer: COMMERCIAL

## 2023-09-05 DIAGNOSIS — K11.8 MASS OF PAROTID GLAND: ICD-10-CM

## 2023-09-05 PROCEDURE — 88172 CYTP DX EVAL FNA 1ST EA SITE: CPT | Performed by: OTOLARYNGOLOGY

## 2023-09-05 PROCEDURE — 88177 CYTP FNA EVAL EA ADDL: CPT | Performed by: OTOLARYNGOLOGY

## 2023-09-05 PROCEDURE — 88112 CYTOPATH CELL ENHANCE TECH: CPT | Performed by: OTOLARYNGOLOGY

## 2023-09-05 PROCEDURE — 88305 TISSUE EXAM BY PATHOLOGIST: CPT | Performed by: OTOLARYNGOLOGY

## 2023-09-05 RX ORDER — LIDOCAINE HYDROCHLORIDE 10 MG/ML
5 INJECTION, SOLUTION INFILTRATION; PERINEURAL ONCE
Status: DISPENSED | OUTPATIENT
Start: 2023-09-05

## 2023-09-06 ENCOUNTER — TELEPHONE (OUTPATIENT)
Dept: OTOLARYNGOLOGY | Facility: CLINIC | Age: 68
End: 2023-09-06
Payer: COMMERCIAL

## 2023-09-06 ENCOUNTER — HOSPITAL ENCOUNTER (OUTPATIENT)
Facility: HOSPITAL | Age: 68
Setting detail: HOSPITAL OUTPATIENT SURGERY
Discharge: HOME | End: 2023-09-06
Attending: INTERNAL MEDICINE | Admitting: INTERNAL MEDICINE
Payer: COMMERCIAL

## 2023-09-06 VITALS
SYSTOLIC BLOOD PRESSURE: 143 MMHG | OXYGEN SATURATION: 99 % | HEIGHT: 71 IN | HEART RATE: 77 BPM | RESPIRATION RATE: 16 BRPM | WEIGHT: 227.8 LBS | TEMPERATURE: 97.8 F | DIASTOLIC BLOOD PRESSURE: 103 MMHG | BODY MASS INDEX: 31.89 KG/M2

## 2023-09-06 DIAGNOSIS — R93.1 ABNORMAL CARDIAC CT ANGIOGRAPHY: ICD-10-CM

## 2023-09-06 LAB
ALBUMIN SERPL-MCNC: 4 G/DL (ref 3.5–5.2)
ALBUMIN/GLOB SERPL: 1.1 G/DL
ALP SERPL-CCNC: 67 U/L (ref 39–117)
ALT SERPL W P-5'-P-CCNC: 38 U/L (ref 1–41)
ANION GAP SERPL CALCULATED.3IONS-SCNC: 11 MMOL/L (ref 5–15)
AST SERPL-CCNC: 34 U/L (ref 1–40)
BEAKER LAB AP INTRAOPERATIVE CONSULTATION: NORMAL
BILIRUB SERPL-MCNC: 0.5 MG/DL (ref 0–1.2)
BUN SERPL-MCNC: 15 MG/DL (ref 8–23)
BUN/CREAT SERPL: 15.8 (ref 7–25)
CALCIUM SPEC-SCNC: 9.3 MG/DL (ref 8.6–10.5)
CHLORIDE SERPL-SCNC: 104 MMOL/L (ref 98–107)
CO2 SERPL-SCNC: 23 MMOL/L (ref 22–29)
CREAT SERPL-MCNC: 0.95 MG/DL (ref 0.76–1.27)
CYTO UR: NORMAL
DEPRECATED RDW RBC AUTO: 43.5 FL (ref 37–54)
EGFRCR SERPLBLD CKD-EPI 2021: 88.3 ML/MIN/1.73
ERYTHROCYTE [DISTWIDTH] IN BLOOD BY AUTOMATED COUNT: 13.2 % (ref 12.3–15.4)
GLOBULIN UR ELPH-MCNC: 3.6 GM/DL
GLUCOSE SERPL-MCNC: 138 MG/DL (ref 65–99)
HCT VFR BLD AUTO: 42.6 % (ref 37.5–51)
HGB BLD-MCNC: 13.8 G/DL (ref 13–17.7)
LAB AP CASE REPORT: NORMAL
LAB AP CLINICAL INFORMATION: NORMAL
Lab: NORMAL
MCH RBC QN AUTO: 29.1 PG (ref 26.6–33)
MCHC RBC AUTO-ENTMCNC: 32.4 G/DL (ref 31.5–35.7)
MCV RBC AUTO: 89.7 FL (ref 79–97)
PATH REPORT.FINAL DX SPEC: NORMAL
PATH REPORT.GROSS SPEC: NORMAL
PLATELET # BLD AUTO: 194 10*3/MM3 (ref 140–450)
PMV BLD AUTO: 10.3 FL (ref 6–12)
POTASSIUM SERPL-SCNC: 4.6 MMOL/L (ref 3.5–5.2)
PROT SERPL-MCNC: 7.6 G/DL (ref 6–8.5)
RBC # BLD AUTO: 4.75 10*6/MM3 (ref 4.14–5.8)
SODIUM SERPL-SCNC: 138 MMOL/L (ref 136–145)
WBC NRBC COR # BLD: 7.54 10*3/MM3 (ref 3.4–10.8)

## 2023-09-06 PROCEDURE — 99152 MOD SED SAME PHYS/QHP 5/>YRS: CPT | Performed by: INTERNAL MEDICINE

## 2023-09-06 PROCEDURE — 99153 MOD SED SAME PHYS/QHP EA: CPT | Performed by: INTERNAL MEDICINE

## 2023-09-06 PROCEDURE — 25010000002 HEPARIN (PORCINE) 2000-0.9 UNIT/L-% SOLUTION: Performed by: INTERNAL MEDICINE

## 2023-09-06 PROCEDURE — 25010000002 FENTANYL CITRATE (PF) 50 MCG/ML SOLUTION: Performed by: INTERNAL MEDICINE

## 2023-09-06 PROCEDURE — 93458 L HRT ARTERY/VENTRICLE ANGIO: CPT | Performed by: INTERNAL MEDICINE

## 2023-09-06 PROCEDURE — 25010000002 MIDAZOLAM PER 1 MG: Performed by: INTERNAL MEDICINE

## 2023-09-06 PROCEDURE — 93799 UNLISTED CV SVC/PROCEDURE: CPT | Performed by: INTERNAL MEDICINE

## 2023-09-06 PROCEDURE — C1894 INTRO/SHEATH, NON-LASER: HCPCS | Performed by: INTERNAL MEDICINE

## 2023-09-06 PROCEDURE — C1769 GUIDE WIRE: HCPCS | Performed by: INTERNAL MEDICINE

## 2023-09-06 PROCEDURE — S0260 H&P FOR SURGERY: HCPCS | Performed by: INTERNAL MEDICINE

## 2023-09-06 PROCEDURE — C1887 CATHETER, GUIDING: HCPCS | Performed by: INTERNAL MEDICINE

## 2023-09-06 PROCEDURE — 25510000001 IOPAMIDOL PER 1 ML: Performed by: INTERNAL MEDICINE

## 2023-09-06 PROCEDURE — 25010000002 DIPHENHYDRAMINE PER 50 MG: Performed by: INTERNAL MEDICINE

## 2023-09-06 PROCEDURE — 85027 COMPLETE CBC AUTOMATED: CPT | Performed by: NURSE PRACTITIONER

## 2023-09-06 PROCEDURE — 80053 COMPREHEN METABOLIC PANEL: CPT | Performed by: NURSE PRACTITIONER

## 2023-09-06 PROCEDURE — 93571 IV DOP VEL&/PRESS C FLO 1ST: CPT | Performed by: INTERNAL MEDICINE

## 2023-09-06 RX ORDER — VERAPAMIL HYDROCHLORIDE 2.5 MG/ML
INJECTION, SOLUTION INTRAVENOUS
Status: DISCONTINUED | OUTPATIENT
Start: 2023-09-06 | End: 2023-09-06 | Stop reason: HOSPADM

## 2023-09-06 RX ORDER — SODIUM CHLORIDE 9 MG/ML
40 INJECTION, SOLUTION INTRAVENOUS AS NEEDED
Status: CANCELLED | OUTPATIENT
Start: 2023-09-06

## 2023-09-06 RX ORDER — SODIUM CHLORIDE 9 MG/ML
100 INJECTION, SOLUTION INTRAVENOUS CONTINUOUS
Status: CANCELLED | OUTPATIENT
Start: 2023-09-06

## 2023-09-06 RX ORDER — DIPHENHYDRAMINE HYDROCHLORIDE 50 MG/ML
INJECTION INTRAMUSCULAR; INTRAVENOUS
Status: DISCONTINUED | OUTPATIENT
Start: 2023-09-06 | End: 2023-09-06 | Stop reason: HOSPADM

## 2023-09-06 RX ORDER — SODIUM CHLORIDE 9 MG/ML
40 INJECTION, SOLUTION INTRAVENOUS AS NEEDED
Status: DISCONTINUED | OUTPATIENT
Start: 2023-09-06 | End: 2023-09-06 | Stop reason: HOSPADM

## 2023-09-06 RX ORDER — FENTANYL CITRATE 50 UG/ML
INJECTION, SOLUTION INTRAMUSCULAR; INTRAVENOUS
Status: DISCONTINUED | OUTPATIENT
Start: 2023-09-06 | End: 2023-09-06 | Stop reason: HOSPADM

## 2023-09-06 RX ORDER — SODIUM CHLORIDE 0.9 % (FLUSH) 0.9 %
10 SYRINGE (ML) INJECTION AS NEEDED
Status: DISCONTINUED | OUTPATIENT
Start: 2023-09-06 | End: 2023-09-06 | Stop reason: HOSPADM

## 2023-09-06 RX ORDER — IBUPROFEN 200 MG
200 TABLET ORAL EVERY 6 HOURS PRN
COMMUNITY

## 2023-09-06 RX ORDER — SODIUM CHLORIDE 0.9 % (FLUSH) 0.9 %
10 SYRINGE (ML) INJECTION AS NEEDED
Status: CANCELLED | OUTPATIENT
Start: 2023-09-06

## 2023-09-06 RX ORDER — HEPARIN SODIUM 200 [USP'U]/100ML
INJECTION, SOLUTION INTRAVENOUS
Status: DISCONTINUED | OUTPATIENT
Start: 2023-09-06 | End: 2023-09-06 | Stop reason: HOSPADM

## 2023-09-06 RX ORDER — ACETAMINOPHEN 325 MG/1
650 TABLET ORAL EVERY 4 HOURS PRN
Status: CANCELLED | OUTPATIENT
Start: 2023-09-06

## 2023-09-06 RX ORDER — SODIUM CHLORIDE 0.9 % (FLUSH) 0.9 %
10 SYRINGE (ML) INJECTION EVERY 12 HOURS SCHEDULED
Status: CANCELLED | OUTPATIENT
Start: 2023-09-06

## 2023-09-06 RX ORDER — SODIUM CHLORIDE 9 MG/ML
75 INJECTION, SOLUTION INTRAVENOUS CONTINUOUS
Status: DISCONTINUED | OUTPATIENT
Start: 2023-09-06 | End: 2023-09-06 | Stop reason: HOSPADM

## 2023-09-06 RX ORDER — ASPIRIN 81 MG/1
81 TABLET ORAL DAILY
Status: DISCONTINUED | OUTPATIENT
Start: 2023-09-07 | End: 2023-09-06 | Stop reason: HOSPADM

## 2023-09-06 RX ORDER — ONDANSETRON 2 MG/ML
4 INJECTION INTRAMUSCULAR; INTRAVENOUS EVERY 6 HOURS PRN
Status: DISCONTINUED | OUTPATIENT
Start: 2023-09-06 | End: 2023-09-06 | Stop reason: HOSPADM

## 2023-09-06 RX ORDER — MIDAZOLAM HYDROCHLORIDE 1 MG/ML
INJECTION INTRAMUSCULAR; INTRAVENOUS
Status: DISCONTINUED | OUTPATIENT
Start: 2023-09-06 | End: 2023-09-06 | Stop reason: HOSPADM

## 2023-09-06 RX ORDER — SODIUM CHLORIDE 0.9 % (FLUSH) 0.9 %
10 SYRINGE (ML) INJECTION EVERY 12 HOURS SCHEDULED
Status: DISCONTINUED | OUTPATIENT
Start: 2023-09-06 | End: 2023-09-06 | Stop reason: HOSPADM

## 2023-09-06 RX ORDER — ASPIRIN 325 MG
325 TABLET ORAL ONCE
Status: DISCONTINUED | OUTPATIENT
Start: 2023-09-06 | End: 2023-09-06 | Stop reason: HOSPADM

## 2023-09-06 RX ORDER — NITROGLYCERIN 0.4 MG/1
0.4 TABLET SUBLINGUAL
Status: DISCONTINUED | OUTPATIENT
Start: 2023-09-06 | End: 2023-09-06 | Stop reason: HOSPADM

## 2023-09-06 RX ORDER — LIDOCAINE HYDROCHLORIDE 20 MG/ML
INJECTION, SOLUTION INFILTRATION; PERINEURAL
Status: DISCONTINUED | OUTPATIENT
Start: 2023-09-06 | End: 2023-09-06 | Stop reason: HOSPADM

## 2023-09-06 RX ORDER — ASPIRIN 81 MG/1
TABLET, CHEWABLE ORAL
Status: COMPLETED
Start: 2023-09-06 | End: 2023-09-06

## 2023-09-06 RX ADMIN — ASPIRIN 324 MG: 81 TABLET, CHEWABLE ORAL at 11:55

## 2023-09-06 RX ADMIN — SODIUM CHLORIDE 75 ML/HR: 9 INJECTION, SOLUTION INTRAVENOUS at 11:27

## 2023-09-06 NOTE — Clinical Note
Hemostasis started on the right radial artery. Radial compression device applied to vessel. Hemostasis achieved successfully. Closure device additional comment: 12ml Air

## 2023-09-06 NOTE — TELEPHONE ENCOUNTER
I spoke with his daughter regarding his cytology result, that is consistent with a Warthin's tumor.  He is currently in the Cath Lab, getting his study.  Charity, his daughter, reported that Dr. Peterson was planning on not performing a stent if he needed one, due to the need for surgery.  I went and spoke with Dr. Peterson, who is already finished with the cath.  He reported that he would not have stented him, but does have a narrowing at the proximal LAD.  Plan is for low-dose aspirin and a statin.  He may need a stent in a few years.    I plan is to follow-up with Mr. Gates in a few weeks, to discuss surgery versus observation.    Electronically signed by Jenaro Hudson MD, 09/06/23, 1:17 PM CDT.

## 2023-09-06 NOTE — H&P
LOS: 0 days   Patient Care Team:  Endy Ortega MD as PCP - General (General Practice)  Neftali Pereira MD as Consulting Physician (Urology)    Chief Complaint: Shortness of breath and chest pain      Subjective    Porfirio Gates is a 67 y.o. male who is being seen         Chest pain both with exertion as well as at rest.  Feels episodes of chest pain occur more often with exertion  Moderate substernal,   Pressure like   Chest pain non pleuritic  Chest pain non positional and non gustatory   Lasts less than 5 minutes    Started more than 3 months ago  Occurs once or twice a week on the average but can be variable in frequency  No associated diaphoresis    No associated nausea  No radiation    Relieved with rest or spontaneously  Not positional    No change with intake of food or antacids  No change with breathing  Mild to moderate associated dyspnea    No similar chest pain episodes in the past     No anticipated endoscopic or any surgical procedures over the next 1 year                 Telemetry: no malignant arrhythmia. No significant pauses.    Review of Systems   Constitutional: No chills   Has fatigue   No fever.   HENT: Negative.    Eyes: Negative.    Respiratory: Negative for cough,   No chest wall soreness,   Shortness of breath,   no wheezing, no stridor.    Cardiovascular: As above  Gastrointestinal: Negative for abdominal distention,  No abdominal pain,   No blood in stool,   No constipation,   No diarrhea,   No nausea   No vomiting.   Endocrine: Negative.    Genitourinary: Negative for difficulty urinating, dysuria, flank pain and hematuria.   Musculoskeletal: Negative.    Skin: Negative for rash and wound.   Allergic/Immunologic: Negative.    Neurological: Negative for dizziness, syncope, weakness,   No light-headedness  No  headaches.   Hematological: Does not bruise/bleed easily.   Psychiatric/Behavioral: Negative for agitation or behavioral problems,   No confusion,   the patient is   nervous/anxious.       History:   Past Medical History:   Diagnosis Date    Kidney stones      Past Surgical History:   Procedure Laterality Date    ADENOIDECTOMY      CHOLECYSTECTOMY      CYSTOSCOPY URETEROSCOPY LASER LITHOTRIPSY Left 3/10/2017    Procedure: CYSTOSCOPY URETEROSCOPY LASER LITHOTRIPSY;  Surgeon: Neftali Pereira MD;  Location: [facility];  Service:     CYSTOSCOPY W/ URETERAL STENT PLACEMENT Left 3/10/2017    Procedure: CYSTOSCOPY URETERAL CATHETER/STENT INSERTION LEFT URETER;  Surgeon: Neftali Pereira MD;  Location: [facility] OR;  Service:      Social History     Socioeconomic History    Marital status: Single   Tobacco Use    Smoking status: Every Day     Packs/day: 0.50     Types: Cigarettes   Substance and Sexual Activity    Alcohol use: No    Drug use: No    Sexual activity: Defer     No family history on file.    Labs:  WBC No results found for: WBC   HGB No results found for: HGB   HCT No results found for: HCT   Platelets No results found for: PLT   MCV No results found for: MCV         Invalid input(s): LABALBU, PROT  Lab Results   Component Value Date    TROPONINI <0.012 03/08/2017     PT/INR:  No results found for: PROTIME/No results found for: INR    Imaging Results (Last 72 Hours)       ** No results found for the last 72 hours. **            Objective     No Known Allergies    Medication Review: Performed  Current Facility-Administered Medications   Medication Dose Route Frequency Provider Last Rate Last Admin    aspirin tablet 325 mg  325 mg Oral Once Monique Murguia APRN        And    [START ON 9/7/2023] aspirin EC tablet 81 mg  81 mg Oral Daily Monique Murguia APRN        nitroglycerin (NITROSTAT) SL tablet 0.4 mg  0.4 mg Sublingual Q5 Min PRN Monique Murguia APRN        ondansetron (ZOFRAN) injection 4 mg  4 mg Intravenous Q6H PRN Monique Murguia APRN        sodium chloride 0.9 % flush 10 mL  10 mL Intravenous Q12H Monique Murguia APRN        sodium chloride 0.9 %  flush 10 mL  10 mL Intravenous PRN Murguia, Monique N, APRN        sodium chloride 0.9 % infusion 40 mL  40 mL Intravenous PRN Murguia, Monique N, APRN        sodium chloride 0.9 % infusion  75 mL/hr Intravenous Continuous Murguia, Monique N, APRN           Vital Sign Min/Max for last 24 hours  No data recorded   No data recorded   No data recorded   No data recorded   No data recorded   No data recorded   No data recorded             Physical Exam:    General Appearance: Awake, alert, in no acute distress  Eyes: Pupils equal and reactive    Ears: Appear intact with no abnormalities noted  Nose: Nares normal, no drainage  Neck: supple, trachea midline, no carotid bruit and no JVD  Back: no kyphosis present,    Lungs: respirations regular, respirations even and respirations unlabored  Heart: normal S1, S2, 2/6 PSM aortic valve area  No gallops or rubs  no rub and no click  Abdomen: normal bowel sounds, no tenderness   Skin: no bleeding, bruising or rash  Extremities: no cyanosis  Psychiatric/Behavioral: Negative for agitation, behavioral problems, confusion, the patient does  appear to be nervous/anxious.       Results Review:   I reviewed the patient's new clinical results.  I reviewed the patient's new imaging results and agree with the interpretation.  I reviewed the patient's other test results and agree with the interpretation  I personally viewed and interpreted the patient's EKG/Telemetry data    Discussed with patient  Updated patient regarding any new or relevant abnormalities on review of records or any new findings on physical exam.   Mentioned to patient about purpose of visit and desirable health short and long term goals and objectives.     Reviewed available prior notes, consults, prior visits, laboratory findings, radiology and cardiology relevant reports.   Updated chart as applicable.   I have reviewed the patient's medical history in detail and updated the computerized patient record as relevant.           Assessment & Plan       Abnormal cardiac CT angiography  coronary artery disease   shortness of breath   chest pain  Mild aortic stenosis   Parotid tumor       Plan    Recommend cardiac catheterization, selective coronary angiography, left ventriculography and percutaneous coronary intervention with application of arteriotomy hemostatic closure device.    I discussed cardiac catheterization, the procedure, risks (including bleeding, infection, vascular damage [including minor oozing, bruising, bleeding, and up to and including but not limited to the need for vascular surgery, emergency cardiothoracic surgery, contrast reaction, renal failure, respiratory failure, heart attack, stroke, arrhythmia and even death), benefits, and alternatives and the patient has voiced understanding and is willing to proceed.    Adequate pre-hydration and post cardiac catheterization hydration.  Premedications as required and indicated for cardiac catheterization.    No contraindication to bare eluting stent placement if required  Will try to not stent/ balloon as awaiting parotid surgery   Patient and daughter like this approach   Radial cath   Further recommendations pending results of cardiac catheterization         Melvin Peterson MD  09/06/23  11:25 CDT    EMR Dragon/Transcription was used to dictate part of this note

## 2023-09-12 ENCOUNTER — TELEPHONE (OUTPATIENT)
Dept: MAMMOGRAPHY | Facility: HOSPITAL | Age: 68
End: 2023-09-12
Payer: COMMERCIAL

## 2023-09-27 ENCOUNTER — OFFICE VISIT (OUTPATIENT)
Dept: OTOLARYNGOLOGY | Facility: CLINIC | Age: 68
End: 2023-09-27
Payer: COMMERCIAL

## 2023-09-27 VITALS — HEART RATE: 82 BPM | TEMPERATURE: 97.1 F | SYSTOLIC BLOOD PRESSURE: 143 MMHG | DIASTOLIC BLOOD PRESSURE: 96 MMHG

## 2023-09-27 DIAGNOSIS — D11.9 WARTHIN'S TUMOR: Primary | ICD-10-CM

## 2023-09-27 DIAGNOSIS — Z72.0 TOBACCO ABUSE: ICD-10-CM

## 2023-09-27 RX ORDER — AMLODIPINE BESYLATE 5 MG
5 TABLET ORAL
COMMUNITY
Start: 2023-06-21

## 2023-09-27 RX ORDER — ASPIRIN 81 MG/1
81 TABLET ORAL DAILY
COMMUNITY

## 2023-09-27 NOTE — PROGRESS NOTES
PRIMARY CARE PROVIDER: Endy Ortega MD  REFERRING PROVIDER: No ref. provider found    Chief Complaint   Patient presents with    Mass     Follow up on neck mass with no new issues voiced at this time       Subjective   History of Present Illness:  Porfirio Gates is a  67 y.o. male who returns to the office for cytology review and further treatment planning regarding the mass of the left parotid. Fifteen or 20 years ago, he was seen by Dr. Bernardo with a mass in the same as placed on his neck.  He reports the needle aspiration was performed, and he was told this was benign.  The plan on a nonoperative, observational course.  He does smoke cigarettes.  He recently presented to the emergency room with complaints of increased left-sided swelling of the face.  He was provided an antibiotic, and the swelling has decreased.  Currently, he reports that the swelling continues to decrease.  He denies any lymph node swelling, unexpected weight loss, facial pain or pressure.  He is curious if any nonoperative courses could be entertained, as the lesion is not bothering him.  He is accompanied by his daughter, who is also serving as an additional historian.    Review of Systems:  Review of Systems   Constitutional:  Negative for chills, fatigue, fever and unexpected weight change (Lost 6-7# - On Ozempic for 1 year).   HENT:  Negative for dental problem, facial swelling, hearing loss, sore throat, trouble swallowing and voice change.    Respiratory:  Negative for cough, chest tightness and shortness of breath.    Cardiovascular:  Negative for chest pain.   Musculoskeletal:  Positive for neck pain.   Skin:  Negative for color change.   Neurological:  Negative for facial asymmetry.   Hematological:  Negative for adenopathy. Does not bruise/bleed easily.     Past History:  Past Medical History:   Diagnosis Date    Hypertension     Kidney stones      Past Surgical History:   Procedure Laterality Date    ADENOIDECTOMY       CARDIAC CATHETERIZATION N/A 9/6/2023    Procedure: Left Heart Cath;  Surgeon: Melvin Peterson MD;  Location:  [facility] CATH INVASIVE LOCATION;  Service: Cardiology;  Laterality: N/A;    CHOLECYSTECTOMY      CYSTOSCOPY URETEROSCOPY LASER LITHOTRIPSY Left 3/10/2017    Procedure: CYSTOSCOPY URETEROSCOPY LASER LITHOTRIPSY;  Surgeon: Neftali Pereira MD;  Location:  [facility] OR;  Service:     CYSTOSCOPY W/ URETERAL STENT PLACEMENT Left 3/10/2017    Procedure: CYSTOSCOPY URETERAL CATHETER/STENT INSERTION LEFT URETER;  Surgeon: Neftali Pereira MD;  Location:  [facility] OR;  Service:      Family History   Problem Relation Age of Onset    Diabetes Father     Cancer Brother      Social History     Tobacco Use    Smoking status: Every Day     Packs/day: 0.50     Types: Cigarettes   Substance Use Topics    Alcohol use: No    Drug use: No     Allergies:  Patient has no known allergies.    Current Outpatient Medications:     aspirin 81 MG EC tablet, Take 1 tablet by mouth Daily., Disp: , Rfl:     Norvasc 5 MG tablet, Take 1 tablet by mouth., Disp: , Rfl:     Ozempic, 0.25 or 0.5 MG/DOSE, 2 MG/3ML solution pen-injector, , Disp: , Rfl:     HYDROcodone-acetaminophen (NORCO) 7.5-325 MG per tablet, Take 1-2 tablets by mouth Every 4 (Four) Hours As Needed for moderate pain (4-6) (Pain). (Patient not taking: Reported on 9/27/2023), Disp: 20 tablet, Rfl: 0    ibuprofen (ADVIL,MOTRIN) 200 MG tablet, Take 1 tablet by mouth Every 6 (Six) Hours As Needed for Mild Pain. (Patient not taking: Reported on 9/27/2023), Disp: , Rfl:     ketorolac (TORADOL) 10 MG tablet, Take 1 tablet by mouth Every 6 (Six) Hours As Needed for moderate pain (4-6) for up to 10 doses. (Patient not taking: Reported on 9/27/2023), Disp: 10 tablet, Rfl: 0  No current facility-administered medications for this visit.      Objective     Vital Signs:  Temp:  [97.1 °F (36.2 °C)] 97.1 °F (36.2 °C)  Heart Rate:  [82] 82  BP: (143)/(96) 143/96    Physical Exam:  Physical Exam  Vitals and  nursing note reviewed.   Constitutional:       General: He is not in acute distress.     Appearance: He is well-developed. He is not diaphoretic.   HENT:      Head: Normocephalic and atraumatic.        Right Ear: External ear normal. Decreased hearing noted.      Left Ear: External ear normal. Decreased hearing noted.      Nose: Nose normal. No septal deviation.      Mouth/Throat:      Lips: Pink.      Mouth: No oral lesions.      Dentition: Dentures: Upper denture - lower partial.      Tongue: No lesions.   Eyes:      General: No scleral icterus.        Right eye: No discharge.         Left eye: No discharge.      Conjunctiva/sclera: Conjunctivae normal.      Pupils: Pupils are equal, round, and reactive to light.   Neck:      Thyroid: No thyromegaly.      Vascular: No JVD.      Trachea: No tracheal deviation.   Pulmonary:      Effort: Pulmonary effort is normal.      Breath sounds: No stridor.   Musculoskeletal:         General: No deformity. Normal range of motion.      Cervical back: Normal range of motion and neck supple.   Lymphadenopathy:      Cervical: No cervical adenopathy.   Skin:     General: Skin is warm and dry.      Coloration: Skin is not pale.      Findings: No erythema or rash.   Neurological:      Mental Status: He is alert and oriented to person, place, and time.      Cranial Nerves: No cranial nerve deficit.      Coordination: Coordination normal.   Psychiatric:         Speech: Speech normal.         Behavior: Behavior normal. Behavior is cooperative.         Thought Content: Thought content normal.         Judgment: Judgment normal.       Results Review:       I have previously reviewed the CT scan images on disc.  There appears to be a 4 cm mass within the left parotid gland.  I was unable to appreciate the cervical lymphadenopathy.            Cytology:      Assessment   Assessment:  1. Warthin's tumor    2. Tobacco abuse          Plan   Plan:  With the benign cytology, and the decreasing  symptoms and size of the mass, we discussed the option of close observation versus parotidectomy.  He is not interested in the parotidectomy, as this fullness is not bothering him, and the cytology is benign.  I discussed the very very low chance of malignant degeneration or Warthin's tumor, however I also discussed that the cytology was from a portion of the tumor, and that represented of the entire tumor itself.  Additional information could provide help for the decision for observation would be a functional MRI, which we do not perform at this institution.  I discussed the option of observation with follow-up in 6 months.  At that time we may consider repeat scanning versus repeat needle aspiration if the lesion does not continue to decrease in size.  He has our number to call should he notice enlargement of the lesion, increased pain, lymph node swelling, or weight loss.    PAROTIDECTOMY: The risks and benefits and alternatives of parotidectomy were explained including but not limited to bleeding, infection, risks of the general anesthesia, pain, partial or total facial nerve injury, either temporary or permanent, Nasra's syndrome or so-called gustatory sweating. Operative possibilities including total parotidectomy with or without neck dissection were discussed. Possibilities for delayed need for total parotidectomy and or neck dissection pending pathologic diagnosis were also discussed. The patient understood these risks. Questions were asked appropriately answered. No guarantees were made or implied        QUALITY MEASURES    Tobacco Use: Screening and Cessation Intervention  Social History    Tobacco Use      Smoking status: Every Day        Packs/day: 0.50        Types: Cigarettes      Smokeless tobacco: Not on file    [unfilled]      My findings and recommendations were discussed and questions were answered.     Jenaro Hudson MD  09/27/23  10:56 CDT

## 2024-04-15 ENCOUNTER — OFFICE VISIT (OUTPATIENT)
Age: 69
End: 2024-04-15
Payer: COMMERCIAL

## 2024-04-15 VITALS — DIASTOLIC BLOOD PRESSURE: 100 MMHG | TEMPERATURE: 98.3 F | SYSTOLIC BLOOD PRESSURE: 160 MMHG | HEART RATE: 81 BPM

## 2024-04-15 DIAGNOSIS — Z72.0 TOBACCO ABUSE: ICD-10-CM

## 2024-04-15 DIAGNOSIS — D11.9 WARTHIN TUMOR: Primary | ICD-10-CM

## 2024-04-15 PROCEDURE — 99212 OFFICE O/P EST SF 10 MIN: CPT | Performed by: OTOLARYNGOLOGY

## 2024-04-15 NOTE — PROGRESS NOTES
PRIMARY CARE PROVIDER: Endy Ortega MD  REFERRING PROVIDER: No ref. provider found    Chief Complaint   Patient presents with    Follow-up     Follow up on neck mass       Subjective   History of Present Illness:  Porfirio Gates is a  68 y.o. male who returns to the office regarding his left parotid mass.  A needle aspiration has been performed on 9/5/23 and was consistent with a Warthin's tumor. He was seen 6 months ago and noted the lesion to be decreasing in size.      Fifteen or 20 years ago, he was seen by Dr. Bernardo with a mass in the same as placed on his neck.  He reports the needle aspiration was performed, and he was told this was benign.  The plan on a nonoperative, observational course.  He does smoke cigarettes.   He denies any lymph node swelling, unexpected weight loss, facial pain or pressure.  He is curious if any nonoperative courses could be entertained, as the lesion is not bothering him.      Review of Systems:  Review of Systems   Constitutional:  Negative for chills, fatigue, fever and unexpected weight change.   HENT:  Negative for dental problem, facial swelling, hearing loss, sore throat, trouble swallowing and voice change.    Respiratory:  Negative for cough, chest tightness and shortness of breath.    Cardiovascular:  Negative for chest pain.   Skin:  Negative for color change.   Neurological:  Negative for facial asymmetry.   Hematological:  Negative for adenopathy. Does not bruise/bleed easily.       Past History:  Past Medical History:   Diagnosis Date    Hypertension     Kidney stones      Past Surgical History:   Procedure Laterality Date    ADENOIDECTOMY      CARDIAC CATHETERIZATION N/A 9/6/2023    Procedure: Left Heart Cath;  Surgeon: Melvin Peterson MD;  Location: [facility] CATH INVASIVE LOC[facility];  Service: Cardiology;  Laterality: N/A;    CHOLECYSTECTOMY      CYSTOSCOPY URETEROSCOPY LASER LITHOTRIPSY Left 3/10/2017    Procedure: CYSTOSCOPY URETEROSCOPY LASER LITHOTRIPSY;   Surgeon: Neftali Pereira MD;  Location:  [facility] OR;  Service:     CYSTOSCOPY W/ URETERAL STENT PLACEMENT Left 3/10/2017    Procedure: CYSTOSCOPY URETERAL CATHETER/STENT INSERTION LEFT URETER;  Surgeon: Neftali Pereira MD;  Location:  [facility] OR;  Service:      Family History   Problem Relation Age of Onset    Diabetes Father     Cancer Brother      Social History     Tobacco Use    Smoking status: Every Day     Current packs/day: 0.50     Types: Cigarettes   Substance Use Topics    Alcohol use: No    Drug use: No     Allergies:  Patient has no known allergies.    Current Outpatient Medications:     aspirin 81 MG EC tablet, Take 1 tablet by mouth Daily., Disp: , Rfl:     Norvasc 5 MG tablet, Take 1 tablet by mouth., Disp: , Rfl:     Ozempic, 0.25 or 0.5 MG/DOSE, 2 MG/3ML solution pen-injector, , Disp: , Rfl:     HYDROcodone-acetaminophen (NORCO) 7.5-325 MG per tablet, Take 1-2 tablets by mouth Every 4 (Four) Hours As Needed for moderate pain (4-6) (Pain). (Patient not taking: Reported on 9/27/2023), Disp: 20 tablet, Rfl: 0    ibuprofen (ADVIL,MOTRIN) 200 MG tablet, Take 1 tablet by mouth Every 6 (Six) Hours As Needed for Mild Pain. (Patient not taking: Reported on 9/27/2023), Disp: , Rfl:     ketorolac (TORADOL) 10 MG tablet, Take 1 tablet by mouth Every 6 (Six) Hours As Needed for moderate pain (4-6) for up to 10 doses. (Patient not taking: Reported on 9/27/2023), Disp: 10 tablet, Rfl: 0      Objective     Vital Signs:  Temp:  [98.3 °F (36.8 °C)] 98.3 °F (36.8 °C)  Heart Rate:  [81] 81  BP: (160)/(100) 160/100    Physical Exam:  Physical Exam  Vitals and nursing note reviewed.   Constitutional:       General: He is not in acute distress.     Appearance: He is well-developed. He is not diaphoretic.   HENT:      Head: Normocephalic and atraumatic.        Right Ear: External ear normal. Decreased hearing noted.      Left Ear: External ear normal. Decreased hearing noted.      Nose: Nose normal. No septal deviation.       Mouth/Throat:      Lips: Pink.      Mouth: No oral lesions.      Dentition: Dentures: Upper denture - lower partial.      Tongue: No lesions.   Eyes:      General: No scleral icterus.        Right eye: No discharge.         Left eye: No discharge.      Conjunctiva/sclera: Conjunctivae normal.      Pupils: Pupils are equal, round, and reactive to light.   Neck:      Thyroid: No thyromegaly.      Vascular: No JVD.      Trachea: No tracheal deviation.   Pulmonary:      Effort: Pulmonary effort is normal.      Breath sounds: No stridor.   Musculoskeletal:         General: No deformity. Normal range of motion.      Cervical back: Normal range of motion and neck supple.   Lymphadenopathy:      Cervical: No cervical adenopathy.   Skin:     General: Skin is warm and dry.      Coloration: Skin is not pale.      Findings: No erythema or rash.   Neurological:      Mental Status: He is alert and oriented to person, place, and time.      Cranial Nerves: No cranial nerve deficit.      Coordination: Coordination normal.   Psychiatric:         Speech: Speech normal.         Behavior: Behavior normal. Behavior is cooperative.         Thought Content: Thought content normal.         Judgment: Judgment normal.         Results Review:       I have previously reviewed the CT scan images on disc.  There appears to be a 4 cm mass within the left parotid gland.  I was unable to appreciate the cervical lymphadenopathy.            Cytology:      Assessment   Assessment:  1. Warthin tumor            Plan   Plan:  He is not interested in parotidectomy, as this fullness is not bothering him, and the cytology is benign.  I discussed the very very low chance of malignant degeneration or Warthin's tumor, however I also discussed that the cytology was from a portion of the tumor, and that represented of the entire tumor itself.  He has delt with this for 15-20 years and it has only become a transient issue once recently.  He would like to continue  to observe.  F/U 1 year.    PAROTIDECTOMY: The risks and benefits and alternatives of parotidectomy were explained including but not limited to bleeding, infection, risks of the general anesthesia, pain, partial or total facial nerve injury, either temporary or permanent, Nasra's syndrome or so-called gustatory sweating. Operative possibilities including total parotidectomy with or without neck dissection were discussed. Possibilities for delayed need for total parotidectomy and or neck dissection pending pathologic diagnosis were also discussed. The patient understood these risks. Questions were asked appropriately answered. No guarantees were made or implied        QUALITY MEASURES    Tobacco Use: Screening and Cessation Intervention  Social History    Tobacco Use      Smoking status: Every Day        Packs/day: 0.50        Types: Cigarettes      Smokeless tobacco: Not on file    [unfilled]      My findings and recommendations were discussed and questions were answered.     Jenaro Hudson MD  04/15/24  11:42 CDT

## 2024-10-01 ENCOUNTER — OFFICE VISIT (OUTPATIENT)
Dept: INTERNAL MEDICINE | Facility: CLINIC | Age: 69
End: 2024-10-01
Payer: MEDICARE

## 2024-10-01 VITALS
HEART RATE: 96 BPM | OXYGEN SATURATION: 96 % | TEMPERATURE: 98.2 F | BODY MASS INDEX: 32.2 KG/M2 | SYSTOLIC BLOOD PRESSURE: 150 MMHG | DIASTOLIC BLOOD PRESSURE: 90 MMHG | WEIGHT: 230 LBS | HEIGHT: 71 IN

## 2024-10-01 DIAGNOSIS — E66.09 CLASS 1 OBESITY DUE TO EXCESS CALORIES WITH SERIOUS COMORBIDITY AND BODY MASS INDEX (BMI) OF 32.0 TO 32.9 IN ADULT: ICD-10-CM

## 2024-10-01 DIAGNOSIS — E66.811 CLASS 1 OBESITY DUE TO EXCESS CALORIES WITH SERIOUS COMORBIDITY AND BODY MASS INDEX (BMI) OF 32.0 TO 32.9 IN ADULT: ICD-10-CM

## 2024-10-01 DIAGNOSIS — R01.1 SYSTOLIC MURMUR: ICD-10-CM

## 2024-10-01 DIAGNOSIS — Z72.0 TOBACCO ABUSE: ICD-10-CM

## 2024-10-01 DIAGNOSIS — I10 PRIMARY HYPERTENSION: ICD-10-CM

## 2024-10-01 DIAGNOSIS — I25.10 NONOBSTRUCTIVE ATHEROSCLEROSIS OF CORONARY ARTERY: ICD-10-CM

## 2024-10-01 DIAGNOSIS — E11.65 TYPE 2 DIABETES MELLITUS WITH HYPERGLYCEMIA, WITHOUT LONG-TERM CURRENT USE OF INSULIN: Primary | ICD-10-CM

## 2024-10-01 PROBLEM — R93.1 ABNORMAL CARDIAC CT ANGIOGRAPHY: Status: RESOLVED | Noted: 2023-07-12 | Resolved: 2024-10-01

## 2024-10-01 LAB — HBA1C MFR BLD: 6.9 % (ref 4.5–5.7)

## 2024-10-01 PROCEDURE — 1160F RVW MEDS BY RX/DR IN RCRD: CPT | Performed by: INTERNAL MEDICINE

## 2024-10-01 PROCEDURE — 3077F SYST BP >= 140 MM HG: CPT | Performed by: INTERNAL MEDICINE

## 2024-10-01 PROCEDURE — 83036 HEMOGLOBIN GLYCOSYLATED A1C: CPT | Performed by: INTERNAL MEDICINE

## 2024-10-01 PROCEDURE — 99204 OFFICE O/P NEW MOD 45 MIN: CPT | Performed by: INTERNAL MEDICINE

## 2024-10-01 PROCEDURE — G2211 COMPLEX E/M VISIT ADD ON: HCPCS | Performed by: INTERNAL MEDICINE

## 2024-10-01 PROCEDURE — 3044F HG A1C LEVEL LT 7.0%: CPT | Performed by: INTERNAL MEDICINE

## 2024-10-01 PROCEDURE — 3080F DIAST BP >= 90 MM HG: CPT | Performed by: INTERNAL MEDICINE

## 2024-10-01 PROCEDURE — 1159F MED LIST DOCD IN RCRD: CPT | Performed by: INTERNAL MEDICINE

## 2024-10-01 PROCEDURE — 1126F AMNT PAIN NOTED NONE PRSNT: CPT | Performed by: INTERNAL MEDICINE

## 2024-10-01 RX ORDER — METFORMIN HCL 500 MG
500 TABLET, EXTENDED RELEASE 24 HR ORAL
Qty: 30 TABLET | Refills: 2 | Status: SHIPPED | OUTPATIENT
Start: 2024-10-01

## 2024-10-01 RX ORDER — LOSARTAN POTASSIUM 25 MG/1
25 TABLET ORAL DAILY
Qty: 30 TABLET | Refills: 2 | Status: SHIPPED | OUTPATIENT
Start: 2024-10-01

## 2024-10-01 NOTE — PROGRESS NOTES
"      Chief Complaint  Establish Care (Previous PCP Dr. Ortega out of [place] /Discuss diabetes/A1c: 6.9) and pneumo vaccine    Subjective        Porfirio Gates presents to [facility] PRIMARY CARE    HPI    Patient here for the above problems.  See Assessment and Plan for further HPI components.      Review of Systems    Objective   Vital Signs:  /90 (BP Location: Left arm, Patient Position: Sitting, Cuff Size: Adult)   Pulse 96   Temp 98.2 °F (36.8 °C) (Temporal)   Ht 180.3 cm (71\")   Wt 104 kg (230 lb)   SpO2 96%   BMI 32.08 kg/m²   Estimated body mass index is 32.08 kg/m² as calculated from the following:    Height as of this encounter: 180.3 cm (71\").    Weight as of this encounter: 104 kg (230 lb).      Physical Exam  Vitals and nursing note reviewed.   Constitutional:       Appearance: He is obese. He is not ill-appearing.   Eyes:      General: No scleral icterus.     Conjunctiva/sclera: Conjunctivae normal.   Cardiovascular:      Rate and Rhythm: Normal rate and regular rhythm.      Heart sounds: Murmur (3/6 to 4/6) heard.   Pulmonary:      Effort: Pulmonary effort is normal. No respiratory distress.   Neurological:      General: No focal deficit present.      Mental Status: He is alert and oriented to person, place, and time.   Psychiatric:         Mood and Affect: Mood normal.         Behavior: Behavior normal.                       Assessment and Plan   Diagnoses and all orders for this visit:    1. Type 2 diabetes mellitus with hyperglycemia, without long-term current use of insulin (Primary)  -     metFORMIN ER (GLUCOPHAGE-XR) 500 MG 24 hr tablet; Take 1 tablet by mouth Daily With Breakfast.  Dispense: 30 tablet; Refill: 2  -     Urinalysis With Microscopic - Urine, Clean Catch  -     Microalbumin / Creatinine Urine Ratio - Urine, Clean Catch    2. Primary hypertension  -     POC Glycated Hemoglobin, Total  -     losartan (Cozaar) 25 MG tablet; Take 1 tablet by mouth " Daily.  Dispense: 30 tablet; Refill: 2  -     Comprehensive Metabolic Panel  -     CBC & Differential  -     Lipid Panel  -     TSH Rfx On Abnormal To Free T4    3. Nonobstructive atherosclerosis of coronary artery    4. Class 1 obesity due to excess calories with serious comorbidity and body mass index (BMI) of 32.0 to 32.9 in adult    5. Systolic murmur    6. Tobacco abuse      Patient presents today to establish care.  Patient previously followed with Dr. Ortega in [facility].    Patient has diabetes.  Patient previously on Ozempic.  Patient no longer on any medication.  Patient has no known complications related to his diabetes.  Hemoglobin A1c is 6.9.  Given that the patient is not not on any medication and has a history of coronary artery disease, I would like to start metformin in order to optimize his glycemic control.  Recommend at least annual eye examination.  Recommend patient keep an eye on his feet.    Patient had a previous heart catheterization that showed nonobstructive coronary artery disease.  Patient was recommended for high intensity statin and aspirin.  Patient only intermittently taking the aspirin, he indicates that he does forget quite a bit.  Recommended that he take a daily enteric coated aspirin.  Also discussed with him that glycemic control as well as consideration of high intensity statin would be beneficial for the patient.  Patient also has another indication for statin which is his diabetes.  Guidelines recommend for patient with type 2 diabetes between the ages of 40 and 75 that that should be on at least a moderate intensity statin.  Patient actually meets indication for high intensity statin as mentioned above due to his nonobstructive coronary artery disease.    Patient has elevated blood pressure.  Patient recently saw ENT for a  parotid tumor.  His blood pressure was noted to be 160/100 at that point in time.  I will start the patient on losartan 100 mg.  This will be  protective for diabetic  nephropathy as well as hypertensive control.  Recommend ambulatory blood pressure monitoring.  Recommending remember to take medication daily.    Patient has noted to have a relatively loud and harsh systolic murmur.  This is heard over the left upper sternal border.  It is recommended that if patient has not done so that he undergo echocardiogram.  Patient thinks that he has had 1 of these before.  He also thinks that he has had a recent colonoscopy.  I will try to get these records.    Patient is also a smoker.  Patient smokes about a pack per day.  Counseled him that this is the #1 cause of preventable death.  Patient does not have any interest in quitting at the present time.  Patient reports that he is quit on multiple occasions but has started back.    Overall summary:  1) Start metformin 500 mg daily.  2) Start losartan 25 mg daily.  Ambulatory BP monitoring.  3) Continue enteric coated aspirin 81 mg.  4) Labs as above  5) Consider statin therapy in the future for history of non-obstructive CAD and type 2 diabetes.  6) Echo?  Will try to get previous results.  7) Consider quitting smoking.    Result Review :  The following data was reviewed by: Jenaro Graff MD on 10/01/2024:            A1C Last 3 Results          10/1/2024    11:45   HGBA1C Last 3 Results   Hemoglobin A1C 6.9             BMI is >= 30 and <35. (Class 1 Obesity). The following options were offered after discussion;: exercise counseling/recommendations and nutrition counseling/recommendations                  Follow Up   Return in about 3 months (around 1/1/2025), or if symptoms worsen or fail to improve, for follow up for above problems. Longitudinal care..  Patient was given instructions and counseling regarding his condition or for health maintenance advice. Please see specific information pulled into the AVS if appropriate.       DIONTE Graff MD, FACP, [facility]      Electronically signed by Jenaro Graff,  MD, 10/01/24, 7:02 PM CDT.

## 2025-01-02 DIAGNOSIS — I10 PRIMARY HYPERTENSION: ICD-10-CM

## 2025-01-02 DIAGNOSIS — E11.65 TYPE 2 DIABETES MELLITUS WITH HYPERGLYCEMIA, WITHOUT LONG-TERM CURRENT USE OF INSULIN: ICD-10-CM

## 2025-01-02 RX ORDER — LOSARTAN POTASSIUM 25 MG/1
25 TABLET ORAL DAILY
Qty: 30 TABLET | Refills: 2 | Status: SHIPPED | OUTPATIENT
Start: 2025-01-02

## 2025-01-02 RX ORDER — METFORMIN HYDROCHLORIDE 500 MG/1
500 TABLET, EXTENDED RELEASE ORAL
Qty: 30 TABLET | Refills: 2 | Status: SHIPPED | OUTPATIENT
Start: 2025-01-02

## 2025-01-28 ENCOUNTER — OFFICE VISIT (OUTPATIENT)
Dept: INTERNAL MEDICINE | Facility: CLINIC | Age: 70
End: 2025-01-28
Payer: MEDICARE

## 2025-01-28 VITALS
DIASTOLIC BLOOD PRESSURE: 80 MMHG | TEMPERATURE: 98.1 F | BODY MASS INDEX: 31.92 KG/M2 | HEIGHT: 71 IN | WEIGHT: 228 LBS | OXYGEN SATURATION: 98 % | SYSTOLIC BLOOD PRESSURE: 138 MMHG | HEART RATE: 104 BPM

## 2025-01-28 DIAGNOSIS — E66.09 CLASS 1 OBESITY DUE TO EXCESS CALORIES WITH SERIOUS COMORBIDITY AND BODY MASS INDEX (BMI) OF 31.0 TO 31.9 IN ADULT: ICD-10-CM

## 2025-01-28 DIAGNOSIS — E66.811 CLASS 1 OBESITY DUE TO EXCESS CALORIES WITH SERIOUS COMORBIDITY AND BODY MASS INDEX (BMI) OF 31.0 TO 31.9 IN ADULT: ICD-10-CM

## 2025-01-28 DIAGNOSIS — E11.65 TYPE 2 DIABETES MELLITUS WITH HYPERGLYCEMIA, WITHOUT LONG-TERM CURRENT USE OF INSULIN: Primary | ICD-10-CM

## 2025-01-28 DIAGNOSIS — I10 PRIMARY HYPERTENSION: ICD-10-CM

## 2025-01-28 LAB — HBA1C MFR BLD: 6.5 % (ref 4.5–5.7)

## 2025-01-28 PROCEDURE — 3075F SYST BP GE 130 - 139MM HG: CPT | Performed by: INTERNAL MEDICINE

## 2025-01-28 PROCEDURE — 83036 HEMOGLOBIN GLYCOSYLATED A1C: CPT | Performed by: INTERNAL MEDICINE

## 2025-01-28 PROCEDURE — 3079F DIAST BP 80-89 MM HG: CPT | Performed by: INTERNAL MEDICINE

## 2025-01-28 PROCEDURE — 3044F HG A1C LEVEL LT 7.0%: CPT | Performed by: INTERNAL MEDICINE

## 2025-01-28 PROCEDURE — 1126F AMNT PAIN NOTED NONE PRSNT: CPT | Performed by: INTERNAL MEDICINE

## 2025-01-28 PROCEDURE — 99214 OFFICE O/P EST MOD 30 MIN: CPT | Performed by: INTERNAL MEDICINE

## 2025-01-28 RX ORDER — SEMAGLUTIDE 0.68 MG/ML
INJECTION, SOLUTION SUBCUTANEOUS
Qty: 3 ML | Refills: 0 | Status: SHIPPED | OUTPATIENT
Start: 2025-01-28 | End: 2025-03-25

## 2025-01-29 NOTE — PROGRESS NOTES
"      Chief Complaint  Hypertension (3 month follow up ), Diabetes (A1c: 6.5/Wants to discuss going back on injection ), and pneumo vaccine    Subjective        Porfirio Gates presents to [facility] PRIMARY CARE    HPI    Patient here for the above problems.  See Assessment and Plan for further HPI components.      Review of Systems    Objective   Vital Signs:  /80 (BP Location: Left arm, Patient Position: Sitting, Cuff Size: Adult)   Pulse 104   Temp 98.1 °F (36.7 °C) (Temporal)   Ht 180.3 cm (71\")   Wt 103 kg (228 lb)   SpO2 98%   BMI 31.80 kg/m²   Estimated body mass index is 31.8 kg/m² as calculated from the following:    Height as of this encounter: 180.3 cm (71\").    Weight as of this encounter: 103 kg (228 lb).      Physical Exam  Vitals and nursing note reviewed.   Constitutional:       Appearance: He is not ill-appearing.   Eyes:      General: No scleral icterus.     Conjunctiva/sclera: Conjunctivae normal.   Pulmonary:      Effort: Pulmonary effort is normal. No respiratory distress.   Neurological:      General: No focal deficit present.      Mental Status: He is alert and oriented to person, place, and time.   Psychiatric:         Mood and Affect: Mood normal.         Behavior: Behavior normal.                     Assessment and Plan   Diagnoses and all orders for this visit:    1. Type 2 diabetes mellitus with hyperglycemia, without long-term current use of insulin (Primary)  -     POC Glycated Hemoglobin, Total    2. Class 1 obesity due to excess calories with serious comorbidity and body mass index (BMI) of 31.0 to 31.9 in adult    3. Primary hypertension    Other orders  -     Semaglutide,0.25 or 0.5MG/DOS, (Ozempic, 0.25 or 0.5 MG/DOSE,) 2 MG/3ML solution pen-injector; Inject 0.25 mg under the skin into the appropriate area as directed 1 (One) Time Per Week for 28 days, THEN 0.5 mg 1 (One) Time Per Week for 28 days.  Dispense: 3 mL; Refill: 0        History of " Present Illness  The patient presents for evaluation of diabetes.    He has expressed a desire to resume his previous regimen of Ozempic injections, which he had discontinued due to insurance-related issues. He recalls being on the highest dose of Ozempic, which had resulted in a slight weight loss. He reports no chest pain or shortness of breath.    Supplemental Information  He has had a cold but is getting over it now. He has had a little trouble with his shoulder, but that is nothing new.    MEDICATIONS  Past: Ozempic    IMMUNIZATIONS  He declined pneumonia vaccine.      Assessment & Plan  1. Diabetes mellitus.  His blood glucose levels are well-regulated, with a recent A1c of 6.5, down from 6.9. His weight remains stable, and his blood pressure readings are within normal range. A prescription for Ozempic will be issued, pending insurance approval. If approved, he will be started on a weekly regimen. The potential benefits of Ozempic, including weight loss and cardiovascular risk reduction, were discussed. If the insurance does not cover Ozempic, an alternative medication will be considered based on the insurance company's recommendations.      2. Obesity    Patient has a BMI > 30.  Obesity increases risks of diseases such as diabetes, coronary artery disease, hypertension, sleep apnea, hyperlipidemia, arthritis, and stroke.  Recommend focusing on portion control and making healthy diet choices.  Avoid fast food.  Avoid calorie beverages including sweet tea, juice, soft drinks, and alcohol.  Recommend increasing your activity and starting a regular exercise program. Can consider utilizing calorie counting apps such as [employer].      3. Hypertension  Patient has hypertension.  BP is at goal.  The patient's BP goal is < 130/80.  Recommend ambulatory BP monitoring after patient has taken medication.  Recommend varying the times of the blood pressure readings.  Patient is currently on losartan 25 mg.  Recommend  we continue current regimen.    Continue to monitor.    Patient has non obstructive CAD based on previous heart cath in 2023.  Patient also diabeteic.  Based on ADA and ACC guidelines patient would benefit from statin therapy.  Will reach out to patient as I forgot to discuss during the visit today.  He has multiple indications for statin therapy.    From last visit I set these goals:  Overall summary:  1) Start metformin 500 mg daily. - Complete - Improved control  2) Start losartan 25 mg daily.  Ambulatory BP monitoring. - Complete, well controlled  3) Continue enteric coated aspirin 81 mg. - Ensure compliance  4) Labs as above  5) Consider statin therapy in the future for history of non-obstructive CAD and type 2 diabetes. - Will discuss   6) Echo?  Will try to get previous results.  7) Consider quitting smoking.    Follow-up  The patient will follow up in 3 months.    Result Review :  The following data was reviewed by: Jenaro Graff MD on 01/28/2025:  A1C Last 3 Results          10/1/2024    11:45 1/28/2025    15:57   HGBA1C Last 3 Results   Hemoglobin A1C 6.9  6.5                               Follow Up   Return in about 3 months (around 4/28/2025), or if symptoms worsen or fail to improve, for follow up for above problems. Longitudinal care..  Patient was given instructions and counseling regarding his condition or for health maintenance advice. Please see specific information pulled into the AVS if appropriate.       DIONTE Graff MD, FACP, [facility]      Electronically signed by Jenaro Graff MD, 01/28/25, 7:17 PM CST.    Patient or patient representative verbalized consent for the use of Ambient Listening during the visit with  Jenaro Graff MD for chart documentation. 1/28/2025  19:28 CST

## 2025-02-11 DIAGNOSIS — I25.10 NONOBSTRUCTIVE ATHEROSCLEROSIS OF CORONARY ARTERY: Primary | ICD-10-CM

## 2025-02-11 DIAGNOSIS — E11.65 TYPE 2 DIABETES MELLITUS WITH HYPERGLYCEMIA, WITHOUT LONG-TERM CURRENT USE OF INSULIN: ICD-10-CM

## 2025-02-11 NOTE — TELEPHONE ENCOUNTER
"Per message from Dr. Graff:    \"I reviewed patients chart last time I saw him and reviewed his heart cath from 2023.  He had non-obstructive coronary artery disease.  Patient would benefit from statin therapy but is not on it.  Statin therapy also recommended for diabetics under the age of 75.      I would recommend lipitor 40 mg if he is willing to do so based on american [employer] cardi[employer] guidelines.\"  "

## 2025-02-13 RX ORDER — ATORVASTATIN CALCIUM 40 MG/1
40 TABLET, FILM COATED ORAL NIGHTLY
Qty: 90 TABLET | Refills: 2 | Status: SHIPPED | OUTPATIENT
Start: 2025-02-13

## 2025-02-13 NOTE — TELEPHONE ENCOUNTER
Called pt and discussed and he is agreeable to trying.  We did briefly discuss potential side effects and he will report to us if anything new after starting.

## 2025-04-26 LAB
ALBUMIN SERPL-MCNC: 4 G/DL (ref 3.9–4.9)
ALBUMIN/CREAT UR: 22 MG/G CREAT (ref 0–29)
ALP SERPL-CCNC: 100 IU/L (ref 44–121)
ALT SERPL-CCNC: 33 IU/L (ref 0–44)
APPEARANCE UR: CLEAR
AST SERPL-CCNC: 33 IU/L (ref 0–40)
BACTERIA #/AREA URNS HPF: NORMAL /[HPF]
BASOPHILS # BLD AUTO: 0 X10E3/UL (ref 0–0.2)
BASOPHILS NFR BLD AUTO: 1 %
BILIRUB SERPL-MCNC: 0.4 MG/DL (ref 0–1.2)
BILIRUB UR QL STRIP: NEGATIVE
BUN SERPL-MCNC: 19 MG/DL (ref 8–27)
BUN/CREAT SERPL: 18 (ref 10–24)
CALCIUM SERPL-MCNC: 9.2 MG/DL (ref 8.6–10.2)
CASTS URNS QL MICRO: NORMAL /LPF
CHLORIDE SERPL-SCNC: 101 MMOL/L (ref 96–106)
CHOLEST SERPL-MCNC: 152 MG/DL (ref 100–199)
CO2 SERPL-SCNC: 21 MMOL/L (ref 20–29)
COLOR UR: YELLOW
CREAT SERPL-MCNC: 1.08 MG/DL (ref 0.76–1.27)
CREAT UR-MCNC: 132.3 MG/DL
EGFRCR SERPLBLD CKD-EPI 2021: 75 ML/MIN/1.73
EOSINOPHIL # BLD AUTO: 0.1 X10E3/UL (ref 0–0.4)
EOSINOPHIL NFR BLD AUTO: 2 %
EPI CELLS #/AREA URNS HPF: NORMAL /HPF (ref 0–10)
ERYTHROCYTE [DISTWIDTH] IN BLOOD BY AUTOMATED COUNT: 13 % (ref 11.6–15.4)
GLOBULIN SER CALC-MCNC: 3.2 G/DL (ref 1.5–4.5)
GLUCOSE SERPL-MCNC: 157 MG/DL (ref 70–99)
GLUCOSE UR QL STRIP: NEGATIVE
HBA1C MFR BLD: 6.7 % (ref 4.8–5.6)
HCT VFR BLD AUTO: 43.3 % (ref 37.5–51)
HDLC SERPL-MCNC: 42 MG/DL
HGB BLD-MCNC: 14.6 G/DL (ref 13–17.7)
HGB UR QL STRIP: NEGATIVE
IMM GRANULOCYTES # BLD AUTO: 0 X10E3/UL (ref 0–0.1)
IMM GRANULOCYTES NFR BLD AUTO: 0 %
KETONES UR QL STRIP: NEGATIVE
LDLC SERPL CALC-MCNC: 74 MG/DL (ref 0–99)
LEUKOCYTE ESTERASE UR QL STRIP: NEGATIVE
LYMPHOCYTES # BLD AUTO: 1.6 X10E3/UL (ref 0.7–3.1)
LYMPHOCYTES NFR BLD AUTO: 26 %
MCH RBC QN AUTO: 29.3 PG (ref 26.6–33)
MCHC RBC AUTO-ENTMCNC: 33.7 G/DL (ref 31.5–35.7)
MCV RBC AUTO: 87 FL (ref 79–97)
MICRO URNS: NORMAL
MICRO URNS: NORMAL
MICROALBUMIN UR-MCNC: 28.5 UG/ML
MONOCYTES # BLD AUTO: 0.6 X10E3/UL (ref 0.1–0.9)
MONOCYTES NFR BLD AUTO: 9 %
NEUTROPHILS # BLD AUTO: 3.8 X10E3/UL (ref 1.4–7)
NEUTROPHILS NFR BLD AUTO: 62 %
NITRITE UR QL STRIP: NEGATIVE
PH UR STRIP: 5.5 [PH] (ref 5–7.5)
PLATELET # BLD AUTO: 185 X10E3/UL (ref 150–450)
POTASSIUM SERPL-SCNC: 4.1 MMOL/L (ref 3.5–5.2)
PROT SERPL-MCNC: 7.2 G/DL (ref 6–8.5)
PROT UR QL STRIP: NORMAL
RBC # BLD AUTO: 4.98 X10E6/UL (ref 4.14–5.8)
RBC #/AREA URNS HPF: NORMAL /HPF (ref 0–2)
SODIUM SERPL-SCNC: 140 MMOL/L (ref 134–144)
SP GR UR STRIP: 1.02 (ref 1–1.03)
TRIGL SERPL-MCNC: 219 MG/DL (ref 0–149)
TSH SERPL DL<=0.005 MIU/L-ACNC: 0.58 UIU/ML (ref 0.45–4.5)
UROBILINOGEN UR STRIP-MCNC: 0.2 MG/DL (ref 0.2–1)
VLDLC SERPL CALC-MCNC: 36 MG/DL (ref 5–40)
WBC # BLD AUTO: 6.1 X10E3/UL (ref 3.4–10.8)
WBC #/AREA URNS HPF: NORMAL /HPF (ref 0–5)

## 2025-06-04 ENCOUNTER — OFFICE VISIT (OUTPATIENT)
Dept: INTERNAL MEDICINE | Facility: CLINIC | Age: 70
End: 2025-06-04
Payer: MEDICARE

## 2025-06-04 VITALS
OXYGEN SATURATION: 97 % | TEMPERATURE: 98.2 F | BODY MASS INDEX: 31.78 KG/M2 | HEART RATE: 99 BPM | SYSTOLIC BLOOD PRESSURE: 134 MMHG | HEIGHT: 71 IN | WEIGHT: 227 LBS | DIASTOLIC BLOOD PRESSURE: 72 MMHG

## 2025-06-04 DIAGNOSIS — I15.9 SECONDARY HYPERTENSION: ICD-10-CM

## 2025-06-04 DIAGNOSIS — E66.09 CLASS 1 OBESITY DUE TO EXCESS CALORIES WITH SERIOUS COMORBIDITY AND BODY MASS INDEX (BMI) OF 31.0 TO 31.9 IN ADULT: ICD-10-CM

## 2025-06-04 DIAGNOSIS — Z12.5 ENCOUNTER FOR PROSTATE CANCER SCREENING: ICD-10-CM

## 2025-06-04 DIAGNOSIS — E11.65 TYPE 2 DIABETES MELLITUS WITH HYPERGLYCEMIA, WITHOUT LONG-TERM CURRENT USE OF INSULIN: Primary | ICD-10-CM

## 2025-06-04 DIAGNOSIS — R21 RASH: ICD-10-CM

## 2025-06-04 DIAGNOSIS — E66.811 CLASS 1 OBESITY DUE TO EXCESS CALORIES WITH SERIOUS COMORBIDITY AND BODY MASS INDEX (BMI) OF 31.0 TO 31.9 IN ADULT: ICD-10-CM

## 2025-06-04 DIAGNOSIS — R01.1 SYSTOLIC MURMUR: ICD-10-CM

## 2025-06-04 LAB — PSA SERPL-MCNC: 6.89 NG/ML (ref 0–4)

## 2025-06-04 RX ORDER — CLOTRIMAZOLE AND BETAMETHASONE DIPROPIONATE 10; .64 MG/G; MG/G
1 CREAM TOPICAL 2 TIMES DAILY
Qty: 45 G | Refills: 0 | Status: SHIPPED | OUTPATIENT
Start: 2025-06-04

## 2025-06-04 RX ORDER — SEMAGLUTIDE 0.68 MG/ML
INJECTION, SOLUTION SUBCUTANEOUS
Qty: 3 ML | Refills: 0 | Status: SHIPPED | OUTPATIENT
Start: 2025-06-04 | End: 2025-07-30

## 2025-06-16 ENCOUNTER — TELEPHONE (OUTPATIENT)
Dept: INTERNAL MEDICINE | Facility: CLINIC | Age: 70
End: 2025-06-16
Payer: MEDICARE

## 2025-06-16 DIAGNOSIS — R91.8 MASS OF UPPER LOBE OF RIGHT LUNG: Primary | ICD-10-CM

## 2025-06-16 NOTE — TELEPHONE ENCOUNTER
"Pt called this morning, asking if we could go over his CT results from [facility] with his daughter, Charity, who was there with him.  Talked with Charity and she says pt gets things mixed up easily and is hard of hearing, so she is trying to find out some results.  She says he was told he had 4 kidney stones and had went to the ER due to back pain after being on his feet a lot.  Dr. Graff had reviewed his CT results this morning,  which were faxed on Friday, and the report indicates there is a right upper lobe lung mass seen on \"prior chest radiograph\" and multiple lytic lesions in the thoracic and lumbar spine.  He does appear to have stones in the kidneys, but nothing trying to pass - nothing noted in the ureters or bladder.    Called [facility] and asked them to push the images for Dr. Graff to review.  Dr. Graff says pt needs a dedicated CT of chest to look at the lung mass that is referenced and then likely a referral to pulmonary after CT reviewed and confirmed.  Dgt understands and will make sure patient goes to [facility] soon for this CT.  "

## 2025-06-17 ENCOUNTER — HOSPITAL ENCOUNTER (OUTPATIENT)
Dept: CT IMAGING | Facility: HOSPITAL | Age: 70
Discharge: HOME | End: 2025-06-17
Admitting: INTERNAL MEDICINE
Payer: MEDICARE

## 2025-06-17 DIAGNOSIS — R91.8 MASS OF UPPER LOBE OF RIGHT LUNG: ICD-10-CM

## 2025-06-17 PROCEDURE — 71250 CT THORAX DX C-: CPT

## 2025-06-25 ENCOUNTER — OFFICE VISIT (OUTPATIENT)
Dept: NEUROSURGERY | Facility: CLINIC | Age: 70
End: 2025-06-25
Payer: MEDICARE

## 2025-06-25 ENCOUNTER — OFFICE VISIT (OUTPATIENT)
Dept: PULMONOLOGY | Facility: CLINIC | Age: 70
End: 2025-06-25
Payer: MEDICARE

## 2025-06-25 VITALS — HEIGHT: 71 IN | WEIGHT: 219 LBS | BODY MASS INDEX: 30.66 KG/M2

## 2025-06-25 VITALS
WEIGHT: 220 LBS | HEIGHT: 71 IN | DIASTOLIC BLOOD PRESSURE: 82 MMHG | HEART RATE: 106 BPM | BODY MASS INDEX: 30.8 KG/M2 | RESPIRATION RATE: 18 BRPM | SYSTOLIC BLOOD PRESSURE: 165 MMHG | OXYGEN SATURATION: 99 %

## 2025-06-25 DIAGNOSIS — Z72.0 TOBACCO ABUSE: ICD-10-CM

## 2025-06-25 DIAGNOSIS — R06.09 CHRONIC DYSPNEA: ICD-10-CM

## 2025-06-25 DIAGNOSIS — C80.1 MALIGNANT NEOPLASM METASTATIC TO LUMBAR SPINE WITH UNKNOWN PRIMARY SITE: Primary | ICD-10-CM

## 2025-06-25 DIAGNOSIS — Z72.0 TOBACCO USE: ICD-10-CM

## 2025-06-25 DIAGNOSIS — C80.1 MALIGNANT NEOPLASM METASTATIC TO LUMBAR SPINE WITH UNKNOWN PRIMARY SITE: ICD-10-CM

## 2025-06-25 DIAGNOSIS — M40.12 OTHER SECONDARY KYPHOSIS, CERVICAL REGION: ICD-10-CM

## 2025-06-25 DIAGNOSIS — J43.9 PULMONARY EMPHYSEMA, UNSPECIFIED EMPHYSEMA TYPE: ICD-10-CM

## 2025-06-25 DIAGNOSIS — R91.8 MASS OF UPPER LOBE OF RIGHT LUNG: Primary | ICD-10-CM

## 2025-06-25 DIAGNOSIS — C79.51 MALIGNANT NEOPLASM METASTATIC TO LUMBAR SPINE WITH UNKNOWN PRIMARY SITE: Primary | ICD-10-CM

## 2025-06-25 DIAGNOSIS — C79.51 MALIGNANT NEOPLASM METASTATIC TO LUMBAR SPINE WITH UNKNOWN PRIMARY SITE: ICD-10-CM

## 2025-06-25 PROCEDURE — 99204 OFFICE O/P NEW MOD 45 MIN: CPT | Performed by: INTERNAL MEDICINE

## 2025-06-25 PROCEDURE — 3079F DIAST BP 80-89 MM HG: CPT | Performed by: INTERNAL MEDICINE

## 2025-06-25 PROCEDURE — 3077F SYST BP >= 140 MM HG: CPT | Performed by: INTERNAL MEDICINE

## 2025-06-25 NOTE — LETTER
2025     Jenaro Graff MD  [address] [place] [address]    Patient: Porfirio Gates   YOB: 1955   Date of Visit: 2025     Dear Dr. Dajuan MD:    Thank you for referring Porfirio Gates to me for evaluation. Below are the relevant portions of my assessment and plan of care.    If you have questions, please do not hesitate to call me. I look forward to following Porfirio along with you.         Sincerely,        Jenniferntngozi Saul,         CC: No Recipients      Progress Notes:    Clinic Note - New Patient            NAME: Porfirio Gates  MRN: [id number]  AGE: 69 y.o.            : 1955  PRIMARY CARE PROVIDER: Jenaro Graff MD               Reason for Visit:  Porfirio Gates presents to clinic today as a new patient for the evaluation of abnormal CT scan.    History of Present Illness:  Mr. Gates is a 69-year-old male who presents to clinic today as a new patient for abnormal chest CT.  Past medical history includes tobacco use, chronic dyspnea.    The patient recently underwent a CT of the abdomen and pelvis due to back and flank pain and history of renal stones.  The patient was noted to have multiple bony lytic lesions including at L1.  This led to a chest x-ray which noted a right upper lobe mass and a follow-up CT.  CT from  showed a right upper lobe mass measuring 3.5 x 2.7 cm.  Also redemonstrated lytic lesion at L1.  The patient was referred to pulmonology due to the abnormal CT findings.    In clinic today we reviewed his imaging.  I explained the right upper lobe mass is most concerning with a lung cancer.  We did discuss biopsy with Ion and EBUS.  However I also explained I am more concerned with the lytic lesion of his lumbar spine causing stenosis and possible compression.  Discussed with neurosurgery who agreed patient needed evaluation for the lytic lesion, the patient will be sent to Dr. Marte's office directly.    The  patient does note chronic dyspnea on exertion.  He does require more moderate to heavy exertion to become short of breath.  Denies any difficulties with his ADLs.  He does note a chronic morning cough and occasional wheezing.  Denies any prior diagnosis of lung disease.  No inhalers or supplemental oxygen at home.  No personal history of lung cancer, his brother and father did have lung cancer.  No personal history of any type of cancer or VTE.  He was in a house which is clean and dry.  Denies any seasonal allergies.    Review of Systems:  A full 12-point review of systems was performed and was negative except as documented in the HPI.               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  Pets: Dog, no allergies  Recent travel: None recent  Previous exposure to persons with tuberculosis: None known            Physical Exam:  General: No acute distress, cooperative.  Head: Atraumatic, normocephalic, normal inspection.  Eyes: EOMI, normal inspection.  ENT: Mucous membranes moist, normal inspection. No thrush.  Heart: RRR, no murmur  Lungs: Clear to auscultation  MSK: No edema or clubbing  GI/abdominal: Soft, nontender.  Neurologic: Alert, oriented.  Cranial nerves II through XII grossly intact.      Imaging Review:  I personally reviewed the chest x-ray and chest CT done this month:  Patient with a right upper lobe pulmonary mass.  Emphysematous changes.  Lytic lesion involving the lumbar spine.      Pulmonary Functions Testing Results:  None available for review.            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  We reviewed his imaging in clinic.  We discussed options including biopsy.  Will biopsy in the right upper lobe and intrathoracic lymph nodes is possible, another option would be to discuss biopsy of the L1 lytic lesion with neurosurgery.  Additionally there is concern for stenosis and possible compression.  Discussed with neurosurgery who recommended evaluation in clinic today, the patient will be sent to Dr. Marte's office directly.  If biopsy of the L1 lesion is possible showing lung primary we will have a diagnosis and stage.      Plan:   - Evaluation by neurosurgery  - PFTs prior to follow-up  - Will await neurosurgery evaluation prior to planning for biopsy of the lung mass, we will arrange follow-up at that time    Porfirio Gates  reports that he has been smoking cigarettes. He has been exposed to tobacco smoke. He has quit using smokeless tobacco. I have educated him on the risk of diseases from using tobacco products such as cancer, COPD, and heart disease.            Follow Up:  Pending neurosurgery evaluation         Thank you Jenaro Graff MD for this referral and allowing me to participate in this patient's care.           Past Medical History:   Past Medical History:   Diagnosis Date   • Hypertension    • Kidney stones          Past Surgical History:   Past Surgical History:   Procedure Laterality Date   • ADENOIDECTOMY     • CARDIAC CATHETERIZATION N/A 9/6/2023    Procedure: Left Heart Cath;  Surgeon: Mlevin Peterson MD;  Location: [facility] CATH INVASIVE LOCATION;  Service: Cardiology;  Laterality: N/A;   • CHOLECYSTECTOMY     • CYSTOSCOPY URETEROSCOPY LASER LITHOTRIPSY Left 3/10/2017    Procedure: CYSTOSCOPY URETEROSCOPY LASER LITHOTRIPSY;  Surgeon: Neftali Pereira MD;  Location: [facility] OR;  Service:    • CYSTOSCOPY W/ URETERAL STENT PLACEMENT Left 3/10/2017    Procedure: CYSTOSCOPY URETERAL  "CATHETER/STENT INSERTION LEFT URETER;  Surgeon: Neftali Pereira MD;  Location: [facility] OR;  Service:          Family History:   Family History   Problem Relation Age of Onset   • No Known Problems Mother    • Diabetes Father    • Heart attack Father    • Colon cancer Father    • Cancer Brother          Medications:   Prior to Admission medications    Medication Sig Start Date End Date Taking? Authorizing Provider   aspirin 81 MG EC tablet Take 1 tablet by mouth Daily.   Yes ProviderKaylyn MD   atorvastatin (LIPITOR) 40 MG tablet Take 1 tablet by mouth Every Night. 2/13/25  Yes Jenaro Graff MD   clotrimazole-betamethasone (LOTRISONE) 1-0.05 % cream Apply 1 Application topically to the appropriate area as directed 2 (Two) Times a Day. 6/4/25  Yes Jenaro Graff MD   ibuprofen (ADVIL,MOTRIN) 200 MG tablet Take 1 tablet by mouth Every 6 (Six) Hours As Needed for Mild Pain.   Yes ProviderKaylyn MD   losartan (COZAAR) 25 MG tablet TAKE 1 TABLET BY MOUTH EVERY DAY 1/2/25  Yes Jenaro Graff MD   metFORMIN ER (GLUCOPHAGE-XR) 500 MG 24 hr tablet TAKE 1 TABLET BY MOUTH EVERY DAY WITH BREAKFAST 1/2/25  Yes Jenaro Graff MD   Semaglutide,0.25 or 0.5MG/DOS, (Ozempic, 0.25 or 0.5 MG/DOSE,) 2 MG/3ML solution pen-injector Inject 0.25 mg under the skin into the appropriate area as directed 1 (One) Time Per Week for 28 days, THEN 0.5 mg 1 (One) Time Per Week for 28 days. 6/4/25 7/30/25 Yes Jenaro Graff MD         Allergies:   Patient has no known allergies.      Results Review:             Visit Vitals  /82   Pulse 106   Resp 18   Ht 180.3 cm (71\")   Wt 99.8 kg (220 lb)   SpO2 99% Comment: r/a   BMI 30.68 kg/m²                  Thiago Saul DO  Pulmonary/Critical Care  6/25/2025  09:50 CDT  "

## 2025-06-25 NOTE — PROGRESS NOTES
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 Patient states he does have lower back pain on the right side that he rates a 7/10. Patient had CT of the chest 06/16/2025.       History of Present Illness    Oncology/Hematology History    No history exists.         History of Present Illness  The patient presents for evaluation of back pain. He is accompanied by his daughter.    He was referred to our clinic by Dr. Saul due to a spot on his lung, which was discovered during a CT scan for his back pain. The scan was initially ordered to investigate potential kidney stones. A subsequent scan was performed last week under the order of Dr. Graff. His primary concern, however, is the presence of spots on his spine. He reports no significant weight changes. He has a history of ruptured discs in his neck and lower back, but he has not sought surgical intervention.    The back pain is predominantly localized to the right side and does not radiate to the groin or thigh. He recalls an instance where the pain extended around his abdomen. He reports no leg weakness, falls, numbness, or tingling. He experiences constipation and difficulty urinating in the mornings. His PSA levels were elevated, prompting a referral to a urologist. The back pain intensifies when he remains standing for extended periods.    He is diabetic and administers Mounjaro injections weekly but does not use insulin.    Social History:    Occupations: Construction    Tobacco: He smokes cigarettes, currently about two cigarettes a day.    FAMILY HISTORY  His brother had lung cancer.       ECOG: (0) Fully Active - Able to Carry On All Pre-disease Performance Without Restriction  KPS: 100: Normal; no evidence of disease      Review of Systems    Past Medical History:   Diagnosis Date    Diabetes type 2      Hypertension     Kidney stones        Past Surgical History:   Procedure Laterality Date    ADENOIDECTOMY      CARDIAC CATHETERIZATION N/A 9/6/2023    Procedure: Left Heart Cath;  Surgeon: Melvin Peterson MD;  Location:  [facility] CATH INVASIVE LOCATION;  Service: Cardiology;  Laterality: N/A;    CHOLECYSTECTOMY      CYSTOSCOPY URETEROSCOPY LASER LITHOTRIPSY Left 3/10/2017    Procedure: CYSTOSCOPY URETEROSCOPY LASER LITHOTRIPSY;  Surgeon: Neftali Pereira MD;  Location:  [facility] OR;  Service:     CYSTOSCOPY W/ URETERAL STENT PLACEMENT Left 3/10/2017    Procedure: CYSTOSCOPY URETERAL CATHETER/STENT INSERTION LEFT URETER;  Surgeon: Neftali Pereira MD;  Location:  [facility] OR;  Service:        Family History: family history includes Cancer in his brother; Colon cancer in his father; Diabetes in his father; Heart attack in his father; No Known Problems in his mother.    Social History:  reports that he has been smoking cigarettes. He has been exposed to tobacco smoke. He has quit using smokeless tobacco. He reports that he does not drink alcohol and does not use drugs.    Medications:    Current Outpatient Medications:     aspirin 81 MG EC tablet, Take 1 tablet by mouth Daily., Disp: , Rfl:     atorvastatin (LIPITOR) 40 MG tablet, Take 1 tablet by mouth Every Night., Disp: 90 tablet, Rfl: 2    clotrimazole-betamethasone (LOTRISONE) 1-0.05 % cream, Apply 1 Application topically to the appropriate area as directed 2 (Two) Times a Day., Disp: 45 g, Rfl: 0    ibuprofen (ADVIL,MOTRIN) 200 MG tablet, Take 1 tablet by mouth Every 6 (Six) Hours As Needed for Mild Pain., Disp: , Rfl:     losartan (COZAAR) 25 MG tablet, TAKE 1 TABLET BY MOUTH EVERY DAY, Disp: 30 tablet, Rfl: 2    metFORMIN ER (GLUCOPHAGE-XR) 500 MG 24 hr tablet, TAKE 1 TABLET BY MOUTH EVERY DAY WITH BREAKFAST, Disp: 30 tablet, Rfl: 2    Semaglutide,0.25 or 0.5MG/DOS, (Ozempic, 0.25 or 0.5 MG/DOSE,) 2 MG/3ML solution pen-injector, Inject 0.25 mg under the skin into the  appropriate area as directed 1 (One) Time Per Week for 28 days, THEN 0.5 mg 1 (One) Time Per Week for 28 days., Disp: 3 mL, Rfl: 0    Allergies:  Patient has no known allergies.    Objective   Physical Exam  Eyes:      General: Lids are normal.      Extraocular Movements: Extraocular movements intact.      Pupils: Pupils are equal, round, and reactive to light.   Neurological:      Coordination: Coordination is intact.      Deep Tendon Reflexes:      Reflex Scores:       Tricep reflexes are 2+ on the right side and 2+ on the left side.       Bicep reflexes are 2+ on the right side and 2+ on the left side.       Brachioradialis reflexes are 2+ on the right side and 2+ on the left side.       Patellar reflexes are 3+ on the right side and 3+ on the left side.       Achilles reflexes are 0 on the right side and 0 on the left side.  Psychiatric:         Speech: Speech normal.       Neurological Exam  Mental Status  Awake, alert and oriented to person, place and time. Speech is normal. Language is fluent with no aphasia. Attention and concentration are normal.    Cranial Nerves  CN II: Visual acuity is normal.  CN III, IV, VI: Extraocular movements intact bilaterally. Normal lids and orbits bilaterally. Pupils equal round and reactive to light bilaterally.  CN V: Facial sensation is normal.  CN VII: Full and symmetric facial movement.  CN IX, X: Palate elevates symmetrically  CN XI: Shoulder shrug strength is normal.    Motor  Normal muscle bulk throughout. Increased muscle tone. BLE.                                               Right                     Left  Toe extension                        5                          5                                             Right                     Left  Deltoid                                   5                          5   Biceps                                   5                          5   Triceps                                  5                          5   Wrist  extensor                       5                          5   Finger flexor                          5                          5   Iliopsoas                               5                          5   Quadriceps                           5                          5   Gastrocnemius                     5                           5   Anterior tibialis                      5                          5    Sensory  Light touch is normal in upper and lower extremities.     Reflexes                                            Right                      Left  Brachioradialis                    2+                         2+  Biceps                                 2+                         2+  Triceps                                2+                         2+  Patellar                                3+                         3+  Achilles                                0                         0  Right Plantar: upgoing  Left Plantar: upgoing    Right pathological reflexes: Shanique's absent. Ankle clonus absent.  Left pathological reflexes: Shanique's absent. Ankle clonus absent.    Coordination    Finger-to-nose, rapid alternating movements and heel-to-shin normal bilaterally without dysmetria.    Gait  Casual gait is normal including stance, stride, and arm swing.  Marked progressive cervical kyphosis..    Male  strength (pounds)  AGE Right Hand RH Norms Left Hand LH Norms   20-24  121+20.6  104+21.8   25-29  120+23.0  110+16.2   30-34  121+22.4  110+21.7   35-39  119+24  113+21.7   40-44  117+20.7  112+18.7   45-49  110+23.0  101+22.8   50-54  113+18.1  102+17   55-59  101+26.7  83+23.4   60-64  90+20.4  77+20.3   65-69 105 91+20.6 95 76.8+19.8   70-74  75+21.5  65+18.1   75+  66+21.0  55+17.0   (AKI Tapia et al; Hand Dynometer: Effects of trials and sessions.  Perpetual and Motor Skills 61:195-8, 1985)  Key  * = Dominant hand  > = Intervention        Imaging: (independent review and interpretation)  CT Chest  Without Contrast Diagnostic  Result Date: 6/17/2025  1. Solid irregular mass at the right upper lobe consistent with neoplasm. 2. Osseous metastasis with lytic destructive lesion on the right posteriorly at L1 with epidural extension of tumor causing at least moderate central canal stenosis. Clinical correlation is recommended and MRI could be performed for further evaluation. There is also lytic destructive lesion involving the right posterior elements of T11 and proximal left clavicle.  This report was signed and finalized on 6/17/2025 12:04 PM by Oleksandr Gama.      Results  Imaging   - CT scan of the neck: 2023, T   - CT scan of the spine: 2023, Lesion at L1 causing compression on the spinal cord, reducing its diameter from 17 mm to 7 mm. A lytic lesion is also present at T11.                ASSESSMENT and PLAN  Porfirio Gates is a 69 y.o. male with a significant comorbidity of known lung mass, hypertension, type 2 diabetes, class I obesity, pulmonary emphysema. He presents with a new problem of 7 out of 10 back pain. Physical exam findings of bilateral Babinski and hyperreflexia in his lower extremities with increased tone in his lower extremities as well as marked cervical kyphosis.  His imaging shows lytic lesions to L1 and T11 with L1 suggesting canal compromise and violation of the posterior wall of the spinal column on noncontrast CT.  Assessment & Plan  1. Back Pain   Lytic lesion at L1 and T11 consistent with metastatic disease to the spine.      The patient presents with back pain primarily localized to the right side, without radiation to the groin or thigh. There is no reported weakness in the legs, falls, numbness, or tingling. Imaging reveals a lytic lesion at T11 and another at L1, with the latter causing significant spinal cord compression, resulting in brisk reflexes in the legs. This condition requires prompt intervention. Three options were discussed: conservative management, biopsy, or  surgical resection of the lesion. Due to the spinal cord compression, surgical removal is advised. An MRI of the cervical, thoracic, and lumbar spine will be ordered to evaluate the extent of the disease and spinal cord involvement. Should the patient's gait deteriorate, legs become numb, or bowel/bladder function worsen, an emergency procedure may be warranted.  We did briefly discuss management options which include conservative management versus biopsy alone versus biopsy and resection.  Will most likely favor open biopsy and resection with partial corpectomy.  But will discuss in clinic after additional imaging obtained.    2.  Cervical kyphosis  - MRI and CT of cervical spine    3. Lung Mass     A lung mass was incidentally discovered during a CAT scan performed for back pain evaluation. This mass is concerning and warrants further investigation. The patient was informed that if the spinal lesion is malignant, it could represent metastasis from the lung. A biopsy of the lung mass may be considered following the management of the spinal lesions.    4. Diabetes Mellitus     The patient is currently managing diabetes with Mounjaro, administered once weekly, and does not require insulin therapy.    Follow-up     The patient will return for a follow-up appointment on the same day as the MRI to discuss the results and further management plans.          Diagnoses and all orders for this visit:    1. 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 Tobacco abuse    3. BMI 30.0-30.9,adult    4. Other secondary kyphosis, cervical region  -     MRI Cervical Spine With & Without Contrast; Future  -     CT Cervical Spine Without Contrast; Future        Return for Return same day as MRI-Francine will call pt .    Thank you for this Consultation and the opportunity to participate in  Porfirio's care.    Sincerely,  Endy Marte MD    I spent 28 minutes caring for Porfirio on this date of service. This time includes time spent by me in the following activities: preparing for the visit, reviewing tests, obtaining and/or reviewing a separately obtained history, performing a medically appropriate examination and/or evaluation, counseling and educating the patient/family/caregiver, ordering medications, tests, or procedures, referring and communicating with other health care professionals, documenting information in the medical record, independently interpreting results and communicating that information with the patient/family/caregiver, and/or care coordination.     Medical Decision Making (2/3)  Problem Points (2,3,4 or more)  Threat to life or body ex. Abrupt change in neurological exam (High)  Undiagnosed new problem (Mod)  Data Points (2,3,4 or more)  CATEGORY 1  INDEPENDENT INTERPRETATION Imaging = 1  ORDERED: Imaging (X-ray,CT, MRI) = 3.  CATEGORY 2  Independent interpretation of test performed by another physician/NPP (Cat 2)  CATEGORY 3  Risk (Low, Mod, High)  LOW    E/M = MDM 2 out of 3   or   TIME  New Level 5 - 99347 = High + (Same as Above but 2 of 3) + High Risk   or   40-54 minutes

## 2025-06-25 NOTE — PROGRESS NOTES
Clinic Note - New Patient            NAME: Porfirio Gates  MRN: [id number]  AGE: 69 y.o.            : 1955  PRIMARY CARE PROVIDER: Jenaro Graff MD               Reason for Visit:  Porfirio Gates presents to clinic today as a new patient for the evaluation of abnormal CT scan.    History of Present Illness:  Mr. Gates is a 69-year-old male who presents to clinic today as a new patient for abnormal chest CT.  Past medical history includes tobacco use, chronic dyspnea.    The patient recently underwent a CT of the abdomen and pelvis due to back and flank pain and history of renal stones.  The patient was noted to have multiple bony lytic lesions including at L1.  This led to a chest x-ray which noted a right upper lobe mass and a follow-up CT.  CT from  showed a right upper lobe mass measuring 3.5 x 2.7 cm.  Also redemonstrated lytic lesion at L1.  The patient was referred to pulmonology due to the abnormal CT findings.    In clinic today we reviewed his imaging.  I explained the right upper lobe mass is most concerning with a lung cancer.  We did discuss biopsy with Ion and EBUS.  However I also explained I am more concerned with the lytic lesion of his lumbar spine causing stenosis and possible compression.  Discussed with neurosurgery who agreed patient needed evaluation for the lytic lesion, the patient will be sent to Dr. Marte's office directly.    The patient does note chronic dyspnea on exertion.  He does require more moderate to heavy exertion to become short of breath.  Denies any difficulties with his ADLs.  He does note a chronic morning cough and occasional wheezing.  Denies any prior diagnosis of lung disease.  No inhalers or supplemental oxygen at home.  No personal history of lung cancer, his brother and father did have lung cancer.  No personal history of any type of cancer or VTE.  He was in a house which is clean and dry.  Denies any seasonal allergies.    Review  of Systems:  A full 12-point review of systems was performed and was negative except as documented in the HPI.               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  Pets: Dog, no allergies  Recent travel: None recent  Previous exposure to persons with tuberculosis: None known            Physical Exam:  General: No acute distress, cooperative.  Head: Atraumatic, normocephalic, normal inspection.  Eyes: EOMI, normal inspection.  ENT: Mucous membranes moist, normal inspection. No thrush.  Heart: RRR, no murmur  Lungs: Clear to auscultation  MSK: No edema or clubbing  GI/abdominal: Soft, nontender.  Neurologic: Alert, oriented.  Cranial nerves II through XII grossly intact.      Imaging Review:  I personally reviewed the chest x-ray and chest CT done this month:  Patient with a right upper lobe pulmonary mass.  Emphysematous changes.  Lytic lesion involving the lumbar spine.      Pulmonary Functions Testing Results:  None available for review.            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  We reviewed his imaging in clinic.  We discussed options including biopsy.  Will biopsy in the right upper lobe and intrathoracic lymph nodes is possible, another option would be to discuss biopsy of the L1 lytic lesion with neurosurgery.  Additionally there is concern for stenosis and possible compression.  Discussed with neurosurgery who recommended evaluation in clinic today, the patient will be sent to Dr. Marte's office directly.  If  biopsy of the L1 lesion is possible showing lung primary we will have a diagnosis and stage.      Plan:   - Evaluation by neurosurgery  - PFTs prior to follow-up  - Will await neurosurgery evaluation prior to planning for biopsy of the lung mass, we will arrange follow-up at that time    Porfirio Gates  reports that he has been smoking cigarettes. He has been exposed to tobacco smoke. He has quit using smokeless tobacco. I have educated him on the risk of diseases from using tobacco products such as cancer, COPD, and heart disease.            Follow Up:  Pending neurosurgery evaluation         Thank you Jenaro Graff MD for this referral and allowing me to participate in this patient's care.           Past Medical History:   Past Medical History:   Diagnosis Date    Hypertension     Kidney stones          Past Surgical History:   Past Surgical History:   Procedure Laterality Date    ADENOIDECTOMY      CARDIAC CATHETERIZATION N/A 9/6/2023    Procedure: Left Heart Cath;  Surgeon: Melvin Peterson MD;  Location: [facility] CATH INVASIVE LOCATION;  Service: Cardiology;  Laterality: N/A;    CHOLECYSTECTOMY      CYSTOSCOPY URETEROSCOPY LASER LITHOTRIPSY Left 3/10/2017    Procedure: CYSTOSCOPY URETEROSCOPY LASER LITHOTRIPSY;  Surgeon: Neftali Pereira MD;  Location: [facility] OR;  Service:     CYSTOSCOPY W/ URETERAL STENT PLACEMENT Left 3/10/2017    Procedure: CYSTOSCOPY URETERAL CATHETER/STENT INSERTION LEFT URETER;  Surgeon: Neftali Pereira MD;  Location: [facility] OR;  Service:          Family History:   Family History   Problem Relation Age of Onset    No Known Problems Mother     Diabetes Father     Heart attack Father     Colon cancer Father     Cancer Brother          Medications:   Prior to Admission medications    Medication Sig Start Date End Date Taking? Authorizing Provider   aspirin 81 MG EC tablet Take 1 tablet by mouth Daily.   Yes Provider, MD Kaylyn   atorvastatin (LIPITOR) 40 MG tablet Take 1 tablet by  "mouth Every Night. 2/13/25  Yes Jenaro Graff MD   clotrimazole-betamethasone (LOTRISONE) 1-0.05 % cream Apply 1 Application topically to the appropriate area as directed 2 (Two) Times a Day. 6/4/25  Yes Jenaro Graff MD   ibuprofen (ADVIL,MOTRIN) 200 MG tablet Take 1 tablet by mouth Every 6 (Six) Hours As Needed for Mild Pain.   Yes ProviderKaylyn MD   losartan (COZAAR) 25 MG tablet TAKE 1 TABLET BY MOUTH EVERY DAY 1/2/25  Yes Jenaro Graff MD   metFORMIN ER (GLUCOPHAGE-XR) 500 MG 24 hr tablet TAKE 1 TABLET BY MOUTH EVERY DAY WITH BREAKFAST 1/2/25  Yes Jenaro Graff MD   Semaglutide,0.25 or 0.5MG/DOS, (Ozempic, 0.25 or 0.5 MG/DOSE,) 2 MG/3ML solution pen-injector Inject 0.25 mg under the skin into the appropriate area as directed 1 (One) Time Per Week for 28 days, THEN 0.5 mg 1 (One) Time Per Week for 28 days. 6/4/25 7/30/25 Yes Jenaro Graff MD         Allergies:   Patient has no known allergies.      Results Review:             Visit Vitals  /82   Pulse 106   Resp 18   Ht 180.3 cm (71\")   Wt 99.8 kg (220 lb)   SpO2 99% Comment: r/a   BMI 30.68 kg/m²                  Thiago Saul DO  Pulmonary/Critical Care  6/25/2025  09:50 CDT  "

## 2025-06-25 NOTE — LETTER
June 30, 2025     Thiago Saul DO  [address]  [place] [address]    Patient: Porfirio Gates   YOB: 1955   Date of Visit: 6/25/2025     Dear Thiago Saul DO:       Thank you for referring Porfirio Gates to me for evaluation. Below are the relevant portions of my assessment and plan of care.    If you have questions, please do not hesitate to call me. I look forward to following Porfirio along with you.         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 Patient states he does have lower back pain on the right side that he rates a 7/10. Patient had CT of the chest 06/16/2025.       History of Present Illness    Oncology/Hematology History    No history exists.         History of Present Illness  The patient presents for evaluation of back pain. He is accompanied by his daughter.    He was referred to our clinic by Dr. Saul due to a spot on his lung, which was discovered during a CT scan for his back pain. The scan was initially ordered to investigate potential kidney stones. A subsequent scan was performed last week under the order of Dr. Graff. His primary concern, however, is the presence of spots on his spine. He reports no significant weight changes. He has a history of ruptured discs in his neck and lower back, but he has not sought surgical intervention.    The back pain is predominantly localized to the right side and does not radiate to the groin or thigh. He recalls an instance where the pain extended around his abdomen. He reports no leg weakness, falls, numbness, or tingling. He experiences constipation and difficulty urinating in the mornings. His PSA levels were  elevated, prompting a referral to a urologist. The back pain intensifies when he remains standing for extended periods.    He is diabetic and administers Mounjaro injections weekly but does not use insulin.    Social History:    Occupations: Construction    Tobacco: He smokes cigarettes, currently about two cigarettes a day.    FAMILY HISTORY  His brother had lung cancer.       ECOG: (0) Fully Active - Able to Carry On All Pre-disease Performance Without Restriction  KPS: 100: Normal; no evidence of disease      Review of Systems    Past Medical History:   Diagnosis Date   • Diabetes type 2    • Hypertension    • Kidney stones        Past Surgical History:   Procedure Laterality Date   • ADENOIDECTOMY     • CARDIAC CATHETERIZATION N/A 9/6/2023    Procedure: Left Heart Cath;  Surgeon: Melvin Peterson MD;  Location:  [facility]ATH [facility]AS[facility];  Service: Cardiology;  Laterality: N/A;   • CHOLECYSTECTOMY     • CYSTOSCOPY URETEROSCOPY LASER LITHOTRIPSY Left 3/10/2017    Procedure: CYSTOSCOPY URETEROSCOPY LASER LITHOTRIPSY;  Surgeon: Neftali Pereira MD;  Location:  [facility] OR;  Service:    • CYSTOSCOPY W/ URETERAL STENT PLACEMENT Left 3/10/2017    Procedure: CYSTOSCOPY URETERAL CATHETER/STENT INSERTION LEFT URETER;  Surgeon: Neftali Pereira MD;  Location:  [facility] OR;  Service:        Family History: family history includes Cancer in his brother; Colon cancer in his father; Diabetes in his father; Heart attack in his father; No Known Problems in his mother.    Social History:  reports that he has been smoking cigarettes. He has been exposed to tobacco smoke. He has quit using smokeless tobacco. He reports that he does not drink alcohol and does not use drugs.    Medications:    Current Outpatient Medications:   •  aspirin 81 MG EC tablet, Take 1 tablet by mouth Daily., Disp: , Rfl:   •  atorvastatin (LIPITOR) 40 MG tablet, Take 1 tablet by mouth Every Night., Disp: 90 tablet, Rfl: 2  •  clotrimazole-betamethasone  (LOTRISONE) 1-0.05 % cream, Apply 1 Application topically to the appropriate area as directed 2 (Two) Times a Day., Disp: 45 g, Rfl: 0  •  ibuprofen (ADVIL,MOTRIN) 200 MG tablet, Take 1 tablet by mouth Every 6 (Six) Hours As Needed for Mild Pain., Disp: , Rfl:   •  losartan (COZAAR) 25 MG tablet, TAKE 1 TABLET BY MOUTH EVERY DAY, Disp: 30 tablet, Rfl: 2  •  metFORMIN ER (GLUCOPHAGE-XR) 500 MG 24 hr tablet, TAKE 1 TABLET BY MOUTH EVERY DAY WITH BREAKFAST, Disp: 30 tablet, Rfl: 2  •  Semaglutide,0.25 or 0.5MG/DOS, (Ozempic, 0.25 or 0.5 MG/DOSE,) 2 MG/3ML solution pen-injector, Inject 0.25 mg under the skin into the appropriate area as directed 1 (One) Time Per Week for 28 days, THEN 0.5 mg 1 (One) Time Per Week for 28 days., Disp: 3 mL, Rfl: 0    Allergies:  Patient has no known allergies.    Objective  Physical Exam  Eyes:      General: Lids are normal.      Extraocular Movements: Extraocular movements intact.      Pupils: Pupils are equal, round, and reactive to light.   Neurological:      Coordination: Coordination is intact.      Deep Tendon Reflexes:      Reflex Scores:       Tricep reflexes are 2+ on the right side and 2+ on the left side.       Bicep reflexes are 2+ on the right side and 2+ on the left side.       Brachioradialis reflexes are 2+ on the right side and 2+ on the left side.       Patellar reflexes are 3+ on the right side and 3+ on the left side.       Achilles reflexes are 0 on the right side and 0 on the left side.  Psychiatric:         Speech: Speech normal.       Neurological Exam  Mental Status  Awake, alert and oriented to person, place and time. Speech is normal. Language is fluent with no aphasia. Attention and concentration are normal.    Cranial Nerves  CN II: Visual acuity is normal.  CN III, IV, VI: Extraocular movements intact bilaterally. Normal lids and orbits bilaterally. Pupils equal round and reactive to light bilaterally.  CN V: Facial sensation is normal.  CN VII: Full and  symmetric facial movement.  CN IX, X: Palate elevates symmetrically  CN XI: Shoulder shrug strength is normal.    Motor  Normal muscle bulk throughout. Increased muscle tone. BLE.                                               Right                     Left  Toe extension                        5                          5                                             Right                     Left  Deltoid                                   5                          5   Biceps                                   5                          5   Triceps                                  5                          5   Wrist extensor                       5                          5   Finger flexor                          5                          5   Iliopsoas                               5                          5   Quadriceps                           5                          5   Gastrocnemius                     5                           5   Anterior tibialis                      5                          5    Sensory  Light touch is normal in upper and lower extremities.     Reflexes                                            Right                      Left  Brachioradialis                    2+                         2+  Biceps                                 2+                         2+  Triceps                                2+                         2+  Patellar                                3+                         3+  Achilles                                0                         0  Right Plantar: upgoing  Left Plantar: upgoing    Right pathological reflexes: Shanique's absent. Ankle clonus absent.  Left pathological reflexes: Shanique's absent. Ankle clonus absent.    Coordination    Finger-to-nose, rapid alternating movements and heel-to-shin normal bilaterally without dysmetria.    Gait  Casual gait is normal including stance, stride, and arm swing.  Marked progressive cervical  kyphosis..    Male  strength (pounds)  AGE Right Hand RH Norms Left Hand LH Norms   20-24  121+20.6  104+21.8   25-29  120+23.0  110+16.2   30-34  121+22.4  110+21.7   35-39  119+24  113+21.7   40-44  117+20.7  112+18.7   45-49  110+23.0  101+22.8   50-54  113+18.1  102+17   55-59  101+26.7  83+23.4   60-64  90+20.4  77+20.3   65-69 105 91+20.6 95 76.8+19.8   70-74  75+21.5  65+18.1   75+  66+21.0  55+17.0   (AKI Tapia et al; Hand Dynometer: Effects of trials and sessions.  Perpetual and Motor Skills 61:195-8, 1985)  Key  * = Dominant hand  > = Intervention        Imaging: (independent review and interpretation)  CT Chest Without Contrast Diagnostic  Result Date: 6/17/2025  1. Solid irregular mass at the right upper lobe consistent with neoplasm. 2. Osseous metastasis with lytic destructive lesion on the right posteriorly at L1 with epidural extension of tumor causing at least moderate central canal stenosis. Clinical correlation is recommended and MRI could be performed for further evaluation. There is also lytic destructive lesion involving the right posterior elements of T11 and proximal left clavicle.  This report was signed and finalized on 6/17/2025 12:04 PM by Oleksandr Gama.      Results  Imaging   - CT scan of the neck: 2023, T   - CT scan of the spine: 2023, Lesion at L1 causing compression on the spinal cord, reducing its diameter from 17 mm to 7 mm. A lytic lesion is also present at T11.                ASSESSMENT and PLAN  Porfirio Gates is a 69 y.o. male with a significant comorbidity of known lung mass, hypertension, type 2 diabetes, class I obesity, pulmonary emphysema. He presents with a new problem of 7 out of 10 back pain. Physical exam findings of bilateral Babinski and hyperreflexia in his lower extremities with increased tone in his lower extremities as well as marked cervical kyphosis.  His imaging shows lytic lesions to L1 and T11 with L1 suggesting canal compromise and violation of  the posterior wall of the spinal column on noncontrast CT.  Assessment & Plan  1. Back Pain   Lytic lesion at L1 and T11 consistent with metastatic disease to the spine.      The patient presents with back pain primarily localized to the right side, without radiation to the groin or thigh. There is no reported weakness in the legs, falls, numbness, or tingling. Imaging reveals a lytic lesion at T11 and another at L1, with the latter causing significant spinal cord compression, resulting in brisk reflexes in the legs. This condition requires prompt intervention. Three options were discussed: conservative management, biopsy, or surgical resection of the lesion. Due to the spinal cord compression, surgical removal is advised. An MRI of the cervical, thoracic, and lumbar spine will be ordered to evaluate the extent of the disease and spinal cord involvement. Should the patient's gait deteriorate, legs become numb, or bowel/bladder function worsen, an emergency procedure may be warranted.  We did briefly discuss management options which include conservative management versus biopsy alone versus biopsy and resection.  Will most likely favor open biopsy and resection with partial corpectomy.  But will discuss in clinic after additional imaging obtained.    2.  Cervical kyphosis  - MRI and CT of cervical spine    3. Lung Mass     A lung mass was incidentally discovered during a CAT scan performed for back pain evaluation. This mass is concerning and warrants further investigation. The patient was informed that if the spinal lesion is malignant, it could represent metastasis from the lung. A biopsy of the lung mass may be considered following the management of the spinal lesions.    4. Diabetes Mellitus     The patient is currently managing diabetes with Mounjaro, administered once weekly, and does not require insulin therapy.    Follow-up     The patient will return for a follow-up appointment on the same day as the MRI to  discuss the results and further management plans.          Diagnoses and all orders for this visit:    1. 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 Tobacco abuse    3. BMI 30.0-30.9,adult    4. Other secondary kyphosis, cervical region  -     MRI Cervical Spine With & Without Contrast; Future  -     CT Cervical Spine Without Contrast; Future        Return for Return same day as MRI-Francine will call pt .    Thank you for this Consultation and the opportunity to participate in Porfirio's care.    Sincerely,  Endy Marte MD    I spent 28 minutes caring for Porfirio on this date of service. This time includes time spent by me in the following activities: preparing for the visit, reviewing tests, obtaining and/or reviewing a separately obtained history, performing a medically appropriate examination and/or evaluation, counseling and educating the patient/family/caregiver, ordering medications, tests, or procedures, referring and communicating with other health care professionals, documenting information in the medical record, independently interpreting results and communicating that information with the patient/family/caregiver, and/or care coordination.     Medical Decision Making (2/3)  Problem Points (2,3,4 or more)  Threat to life or body ex. Abrupt change in neurological exam (High)  Undiagnosed new problem (Mod)  Data Points (2,3,4 or more)  CATEGORY 1  INDEPENDENT INTERPRETATION Imaging = 1  ORDERED: Imaging (X-ray,CT, MRI) = 3.  CATEGORY 2  Independent interpretation of test performed by another physician/NPP (Cat 2)  CATEGORY 3  Risk (Low, Mod, High)  LOW    E/M = MDM 2 out of 3   or   TIME  New Level 5 - 01552 = High + (Same as Above but 2 of 3) + High Risk   or   40-54 minutes

## 2025-06-25 NOTE — PATIENT INSTRUCTIONS
Options:  Observation  Biopsy  Surgical resection and stabilization    Next steps - MRI of entire spine    IF YOU SMOKE, VAPE OR USE TOBACCO PLEASE READ THE FOLLOWING:  Why is smoking bad for me?  Smoking increases the risk of heart disease, lung disease, vascular disease, stroke, and cancer. If you smoke, STOP!        IF YOU SMOKE, VAPE OR USE TOBACCO PLEASE READ THE FOLLOWING:  Why is smoking bad for me?  Smoking increases the risk of heart disease, lung disease, vascular disease, stroke, and cancer. If you smoke, STOP!    For more information:  Quit Now [place]  1-800-QUIT-NOW  https://[place].quitlogix.org/en-[place]/

## 2025-06-26 ENCOUNTER — TELEPHONE (OUTPATIENT)
Dept: NEUROSURGERY | Facility: CLINIC | Age: 70
End: 2025-06-26

## 2025-06-26 NOTE — TELEPHONE ENCOUNTER
Caller: DALILA    Relationship: DAUGHTER    Best call back number: [phone]    What is the best time to reach you: ANYTIME    What was the call regarding: PATIENTS DAUGHTER CALLED STATING THE PATIENT IS SUPPOSED TO HAVE A STAT MRI COMPLETED - WAS DR HUITRON ON 06.25.25 - OVN NOT COMPLETED YET - STATES MRI MAYBE FOR CERVICAL, CHEST AND LUMBAR     OK TO SCHEDULE ANYTIME STARTING NEXT WK SO SHE CAN HAVE ENOUGH TO GET OFF WORK TO TAKE HIM     PLEASE ADVISE  THANK YOU

## 2025-06-27 ENCOUNTER — PATIENT ROUNDING (BHMG ONLY) (OUTPATIENT)
Dept: NEUROSURGERY | Facility: CLINIC | Age: 70
End: 2025-06-27
Payer: MEDICARE

## 2025-06-27 NOTE — TELEPHONE ENCOUNTER
Spoke with patient daughter I informed her that Dr. Marte was not in office Thursday, or Friday and once he came in Monday I would have him sign pt orders. I informed her I would call her as soon as I had them scheduled. Patient daughter verbalized understanding and will call with any questions or concerns.

## 2025-06-27 NOTE — PROGRESS NOTES
A My-Chart message has been sent to the patient for PATIENT ROUNDING with [facility].

## 2025-06-30 ENCOUNTER — TELEPHONE (OUTPATIENT)
Dept: NEUROSURGERY | Facility: CLINIC | Age: 70
End: 2025-06-30
Payer: MEDICARE

## 2025-06-30 NOTE — TELEPHONE ENCOUNTER
Per pt request spoke with patient daughter and informed her of dates and times for pt imaging. Patient daughter confirmed imaging date and times and will contact pt to inform him of appts.

## 2025-07-01 ENCOUNTER — HOSPITAL ENCOUNTER (OUTPATIENT)
Dept: CT IMAGING | Facility: HOSPITAL | Age: 70
Discharge: HOME | End: 2025-07-01
Payer: MEDICARE

## 2025-07-01 ENCOUNTER — HOSPITAL ENCOUNTER (OUTPATIENT)
Dept: MRI IMAGING | Facility: HOSPITAL | Age: 70
Discharge: HOME | End: 2025-07-01
Payer: MEDICARE

## 2025-07-01 ENCOUNTER — TELEPHONE (OUTPATIENT)
Dept: MRI IMAGING | Facility: HOSPITAL | Age: 70
End: 2025-07-01
Payer: MEDICARE

## 2025-07-01 DIAGNOSIS — M40.12 OTHER SECONDARY KYPHOSIS, CERVICAL REGION: ICD-10-CM

## 2025-07-01 DIAGNOSIS — C79.51 MALIGNANT NEOPLASM METASTATIC TO LUMBAR SPINE WITH UNKNOWN PRIMARY SITE: ICD-10-CM

## 2025-07-01 DIAGNOSIS — C80.1 MALIGNANT NEOPLASM METASTATIC TO LUMBAR SPINE WITH UNKNOWN PRIMARY SITE: ICD-10-CM

## 2025-07-01 PROCEDURE — 72157 MRI CHEST SPINE W/O & W/DYE: CPT

## 2025-07-01 PROCEDURE — A9573 GADOPICLENOL 0.5 MMOL/ML SOLUTION: HCPCS | Performed by: NEUROLOGICAL SURGERY

## 2025-07-01 PROCEDURE — 72125 CT NECK SPINE W/O DYE: CPT

## 2025-07-01 PROCEDURE — 72156 MRI NECK SPINE W/O & W/DYE: CPT

## 2025-07-01 PROCEDURE — 25510000001 GADOPICLENOL 0.5 MMOL/ML SOLUTION: Performed by: NEUROLOGICAL SURGERY

## 2025-07-01 RX ADMIN — GADOPICLENOL 10 ML: 485.1 INJECTION INTRAVENOUS at 09:42

## 2025-07-01 NOTE — TELEPHONE ENCOUNTER
Called and offered him an MRI appt for tomorrow and he stated he wants to keep his current appt for the 10th because his back is really hurting him from today's MRI and he needs time before he can do the last exam. Office notified over email.

## 2025-07-10 ENCOUNTER — HOSPITAL ENCOUNTER (OUTPATIENT)
Dept: MRI IMAGING | Facility: HOSPITAL | Age: 70
Discharge: HOME | End: 2025-07-10
Admitting: NEUROLOGICAL SURGERY
Payer: MEDICARE

## 2025-07-10 DIAGNOSIS — C79.51 MALIGNANT NEOPLASM METASTATIC TO LUMBAR SPINE WITH UNKNOWN PRIMARY SITE: ICD-10-CM

## 2025-07-10 DIAGNOSIS — C80.1 MALIGNANT NEOPLASM METASTATIC TO LUMBAR SPINE WITH UNKNOWN PRIMARY SITE: ICD-10-CM

## 2025-07-10 LAB — CREAT BLDA-MCNC: 1.3 MG/DL (ref 0.6–1.3)

## 2025-07-10 PROCEDURE — 82565 ASSAY OF CREATININE: CPT

## 2025-07-10 PROCEDURE — A9573 GADOPICLENOL 0.5 MMOL/ML SOLUTION: HCPCS | Performed by: NEUROLOGICAL SURGERY

## 2025-07-10 PROCEDURE — 25510000001 GADOPICLENOL 0.5 MMOL/ML SOLUTION: Performed by: NEUROLOGICAL SURGERY

## 2025-07-10 PROCEDURE — 72158 MRI LUMBAR SPINE W/O & W/DYE: CPT

## 2025-07-10 RX ADMIN — GADOPICLENOL 10 ML: 485.1 INJECTION INTRAVENOUS at 18:27

## 2025-07-14 ENCOUNTER — OFFICE VISIT (OUTPATIENT)
Dept: NEUROSURGERY | Facility: CLINIC | Age: 70
End: 2025-07-14
Payer: MEDICARE

## 2025-07-14 VITALS — HEIGHT: 71 IN | BODY MASS INDEX: 30.1 KG/M2 | WEIGHT: 215 LBS

## 2025-07-14 DIAGNOSIS — C80.1 MALIGNANT NEOPLASM METASTATIC TO LUMBAR SPINE WITH UNKNOWN PRIMARY SITE: Primary | ICD-10-CM

## 2025-07-14 DIAGNOSIS — Z72.0 TOBACCO ABUSE: ICD-10-CM

## 2025-07-14 DIAGNOSIS — M83.2 ADULT OSTEOMALACIA DUE TO MALABSORPTION: ICD-10-CM

## 2025-07-14 DIAGNOSIS — M40.12 OTHER SECONDARY KYPHOSIS, CERVICAL REGION: ICD-10-CM

## 2025-07-14 DIAGNOSIS — C79.51 MALIGNANT NEOPLASM METASTATIC TO LUMBAR SPINE WITH UNKNOWN PRIMARY SITE: Primary | ICD-10-CM

## 2025-07-14 PROCEDURE — 99215 OFFICE O/P EST HI 40 MIN: CPT | Performed by: NEUROLOGICAL SURGERY

## 2025-07-14 PROCEDURE — 1160F RVW MEDS BY RX/DR IN RCRD: CPT | Performed by: NEUROLOGICAL SURGERY

## 2025-07-14 PROCEDURE — 1159F MED LIST DOCD IN RCRD: CPT | Performed by: NEUROLOGICAL SURGERY

## 2025-07-14 PROCEDURE — G2212 PROLONG OUTPT/OFFICE VIS: HCPCS | Performed by: NEUROLOGICAL SURGERY

## 2025-07-14 RX ORDER — CHLORHEXIDINE GLUCONATE 40 MG/ML
SOLUTION TOPICAL
Qty: 120 ML | Refills: 0 | Status: SHIPPED | OUTPATIENT
Start: 2025-07-14 | End: 2025-07-15 | Stop reason: HOSPADM

## 2025-07-14 RX ORDER — CHLORHEXIDINE GLUCONATE ORAL RINSE 1.2 MG/ML
15 SOLUTION DENTAL EVERY 12 HOURS SCHEDULED
Status: CANCELLED | OUTPATIENT
Start: 2025-07-14

## 2025-07-14 NOTE — H&P (VIEW-ONLY)
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 Patient had MRI of lumbar,MRI of thoracic spine on 07/2025 at [facility]. Patient had CT of cervical spine on 07/10/2025 at [facility].       History of Present Illness    Oncology/Hematology History    No history exists.         History of Present Illness  The patient presents for evaluation of back pain. He is accompanied by his daughter.    He was referred to our clinic by Dr. Saul due to a spot on his lung, which was discovered during a CT scan for his back pain. The scan was initially ordered to investigate potential kidney stones. A subsequent scan was performed last week under the order of Dr. Graff. His primary concern, however, is the presence of spots on his spine. He reports no significant weight changes. He has a history of ruptured discs in his neck and lower back, but he has not sought surgical intervention.    The back pain is predominantly localized to the right side and does not radiate to the groin or thigh. He recalls an instance where the pain extended around his abdomen. He reports no leg weakness, falls, numbness, or tingling. He experiences constipation and difficulty urinating in the mornings. His PSA levels were elevated, prompting a referral to a urologist. The back pain intensifies when he remains standing for extended periods.    He is diabetic and administers Mounjaro injections weekly but does not use insulin.    Social History:    Occupations: Construction    Tobacco: He smokes cigarettes, currently about two cigarettes a day.    FAMILY HISTORY  His brother had lung cancer.      The patient presents for a follow-up of suspected neoplasm cancer to the spine, accompanied by his  daughter.    He reports intermittent back pain, which is currently absent. The pain typically manifests after prolonged periods of standing and is primarily localized to the right side of his back, occasionally radiating to the side. He does not experience any exacerbation of pain with bending over and can manage the discomfort by finding a suitable sitting or lying position. He has not undergone a biopsy yet. He recalls a previous discussion about the potential need for metal pins and the inability to use radiation due to its proximity to the spine. He is concerned about the possibility of cancer and the necessity of surgery.    Additionally, he mentions that his head has been tilted to one side for approximately 6 months. He reports no bowel or bladder issues, including incontinence. He also reports no numbness or tingling in his legs. He has not experienced any recent falls.    He has resumed his Ozempic injections for diabetes management, which he had previously discontinued after losing 40 pounds.    Social History:    Occupations: Construction    FAMILY HISTORY  His mother had a hysterectomy and underwent radiation treatments in her 50s.       ECOG: (0) Fully Active - Able to Carry On All Pre-disease Performance Without Restriction  KPS: 100: Normal; no evidence of disease      Review of Systems   Constitutional: Negative.    HENT: Negative.     Eyes: Negative.    Respiratory: Negative.     Cardiovascular: Negative.    Gastrointestinal: Negative.    Endocrine: Negative.    Genitourinary: Negative.    Musculoskeletal:  Positive for back pain.   Skin: Negative.    Allergic/Immunologic: Negative.    Neurological: Negative.    Hematological: Negative.    Psychiatric/Behavioral: Negative.         Past Medical History:   Diagnosis Date    Diabetes type 2     Hypertension     Kidney stones        Past Surgical History:   Procedure Laterality Date    ADENOIDECTOMY      CARDIAC CATHETERIZATION N/A 9/6/2023     Procedure: Left Heart Cath;  Surgeon: Melvin Peterson MD;  Location:  [facility] CATH INVASIVE LOC[facility];  Service: Cardiology;  Laterality: N/A;    CHOLECYSTECTOMY      CYSTOSCOPY URETEROSCOPY LASER LITHOTRIPSY Left 3/10/2017    Procedure: CYSTOSCOPY URETEROSCOPY LASER LITHOTRIPSY;  Surgeon: Neftali Pereira MD;  Location:  [facility] OR;  Service:     CYSTOSCOPY W/ URETERAL STENT PLACEMENT Left 3/10/2017    Procedure: CYSTOSCOPY URETERAL CATHETER/STENT INSERTION LEFT URETER;  Surgeon: Neftali Pereira MD;  Location:  [facility] OR;  Service:        Family History: family history includes Cancer in his brother; Colon cancer in his father; Diabetes in his father; Heart attack in his father; No Known Problems in his mother.    Social History:  reports that he has been smoking cigarettes. He has been exposed to tobacco smoke. He has quit using smokeless tobacco. He reports that he does not drink alcohol and does not use drugs.    Medications:    Current Outpatient Medications:     aspirin 81 MG EC tablet, Take 1 tablet by mouth Daily., Disp: , Rfl:     atorvastatin (LIPITOR) 40 MG tablet, Take 1 tablet by mouth Every Night., Disp: 90 tablet, Rfl: 2    clotrimazole-betamethasone (LOTRISONE) 1-0.05 % cream, Apply 1 Application topically to the appropriate area as directed 2 (Two) Times a Day., Disp: 45 g, Rfl: 0    ibuprofen (ADVIL,MOTRIN) 200 MG tablet, Take 1 tablet by mouth Every 6 (Six) Hours As Needed for Mild Pain., Disp: , Rfl:     losartan (COZAAR) 25 MG tablet, TAKE 1 TABLET BY MOUTH EVERY DAY, Disp: 30 tablet, Rfl: 2    metFORMIN ER (GLUCOPHAGE-XR) 500 MG 24 hr tablet, TAKE 1 TABLET BY MOUTH EVERY DAY WITH BREAKFAST, Disp: 30 tablet, Rfl: 2    Semaglutide,0.25 or 0.5MG/DOS, (Ozempic, 0.25 or 0.5 MG/DOSE,) 2 MG/3ML solution pen-injector, Inject 0.25 mg under the skin into the appropriate area as directed 1 (One) Time Per Week for 28 days, THEN 0.5 mg 1 (One) Time Per Week for 28 days., Disp: 3 mL, Rfl: 0    Chlorhexidine  Gluconate 4 % solution, Shower each day with solution for 5 days beginning 5 days before surgery., Disp: 120 mL, Rfl: 0    Allergies:  Patient has no known allergies.    Objective   Physical Exam  Eyes:      General: Lids are normal.      Extraocular Movements: Extraocular movements intact.      Pupils: Pupils are equal, round, and reactive to light.   Neurological:      Coordination: Coordination is intact.      Deep Tendon Reflexes:      Reflex Scores:       Tricep reflexes are 2+ on the right side and 2+ on the left side.       Bicep reflexes are 2+ on the right side and 2+ on the left side.       Brachioradialis reflexes are 2+ on the right side and 2+ on the left side.       Patellar reflexes are 3+ on the right side and 3+ on the left side.       Achilles reflexes are 0 on the right side and 0 on the left side.  Psychiatric:         Speech: Speech normal.       Neurological Exam  Mental Status  Awake, alert and oriented to person, place and time. Speech is normal. Language is fluent with no aphasia. Attention and concentration are normal.    Cranial Nerves  CN II: Visual acuity is normal.  CN III, IV, VI: Extraocular movements intact bilaterally. Normal lids and orbits bilaterally. Pupils equal round and reactive to light bilaterally.  CN V: Facial sensation is normal.  CN VII: Full and symmetric facial movement.  CN IX, X: Palate elevates symmetrically  CN XI: Shoulder shrug strength is normal.    Motor  Normal muscle bulk throughout. Increased muscle tone. BLE.                                               Right                     Left  Toe extension                        5                          5                                             Right                     Left  Deltoid                                   5                          5   Biceps                                   5                          5   Triceps                                  5                          5   Wrist extensor                        5                          5   Finger flexor                          5                          5   Iliopsoas                               5                          5   Quadriceps                           5                          5   Gastrocnemius                     5                           5   Anterior tibialis                      5                          5    Sensory  Light touch is normal in upper and lower extremities.     Reflexes                                            Right                      Left  Brachioradialis                    2+                         2+  Biceps                                 2+                         2+  Triceps                                2+                         2+  Patellar                                3+                         3+  Achilles                                0                         0  Right Plantar: upgoing  Left Plantar: upgoing    Right pathological reflexes: Shanique's absent. Ankle clonus absent.  Left pathological reflexes: Shanique's absent. Ankle clonus absent.    Coordination    Finger-to-nose, rapid alternating movements and heel-to-shin normal bilaterally without dysmetria.    Gait  Casual gait is normal including stance, stride, and arm swing.  Marked progressive cervical kyphosis..    Male  strength (pounds)  AGE Right Hand RH Norms Left Hand LH Norms   20-24  121+20.6  104+21.8   25-29  120+23.0  110+16.2   30-34  121+22.4  110+21.7   35-39  119+24  113+21.7   40-44  117+20.7  112+18.7   45-49  110+23.0  101+22.8   50-54  113+18.1  102+17   55-59  101+26.7  83+23.4   60-64  90+20.4  77+20.3   65-69 105 91+20.6 95 76.8+19.8   70-74  75+21.5  65+18.1   75+  66+21.0  55+17.0   (AKI Tapia et al; Hand Dynometer: Effects of trials and sessions.  Perpetual and Motor Skills 61:195-8, 1985)  Key  * = Dominant hand  > = Intervention        Imaging: (independent review and interpretation)  MRI Lumbar Spine With &  Without Contrast  Result Date: 7/10/2025  1. T2 hyperintense lesion posteriorly on the right at L1 with bowing of the posterior cortex causing moderate central canal stenosis and narrows the right neural foramen. 2. Similar-appearing T2 hyperintense lesions at the right posterior superior endplate of L2 and right lamina T11.   This report was signed and finalized on 7/10/2025 6:58 PM by Oleksandr Gama.      CT Cervical Spine Without Contrast  Result Date: 7/1/2025  1. Greatest spinal canal stenosis appears mild at C5-6 and C6-7. 2. Multilevel severe foraminal stenoses, predominantly RIGHT more than LEFT, as described in detail above. Other level by level findings as above. 3. No evidence of bony metastatic disease by CT.  This report was signed and finalized on 7/1/2025 11:02 AM by Dr Claire Cornelius MD.      MRI Cervical Spine With & Without Contrast  Result Date: 7/1/2025  1. No osseous metastatic disease identified in the cervical spine. 2. Multilevel cervical spine osteoarthritis change, as detailed above. No high-grade spinal stenosis. No compressive myelopathy or myelomalacia.    This report was signed and finalized on 7/1/2025 10:52 AM by Dr Adriano Carroll.      MRI Thoracic Spine With & Without Contrast  Result Date: 7/1/2025  Impression: 1. Osseous metastatic disease at the T11, L1 and L2 vertebral levels. 2. Known metastatic lesion at L1 (from recent chest CT) involves the vertebral body and right-sided posterior elements and there is extraosseous soft tissue mass within the spinal canal. This results in a moderate spinal stenosis and there is also a pathologic fracturing with minimal loss of height along the inferior endplate. 3. T11 lesion is centered in the posterior elements right of midline and there is some associated extraosseous soft tissue mass although there is no actual soft tissue mass within the spinal canal at this level.  This report was signed and finalized on 7/1/2025 10:38 AM by Dr Oh  Glen.      CT Chest Without Contrast Diagnostic  Result Date: 6/17/2025  1. Solid irregular mass at the right upper lobe consistent with neoplasm. 2. Osseous metastasis with lytic destructive lesion on the right posteriorly at L1 with epidural extension of tumor causing at least moderate central canal stenosis. Clinical correlation is recommended and MRI could be performed for further evaluation. There is also lytic destructive lesion involving the right posterior elements of T11 and proximal left clavicle.  This report was signed and finalized on 6/17/2025 12:04 PM by Oleksandr Gama.      Results  Imaging   - CT scan of the neck: 2023, T   - CT scan of the spine: 2023, Lesion at L1 causing compression on the spinal cord, reducing its diameter from 17 mm to 7 mm. A lytic lesion is also present at T11.      Imaging   - CT scan of the chest: Two areas of concern.   - MRI of the thoracic spine with and without contrast: Three areas of concern at T11, L1, and L2. The lesion at T11 is not causing pressure on the spinal cord. The lesion at L1 is starting to cause a problem. The part at L2 is just a small blip and not much of a concern.   - MRI of the cervical spine: No evidence of cancer or osseous metastatic lesions. Indicates a healed fracture at the right C2, C3 facet and degenerative disk disease.                        ASSESSMENT and PLAN  Porfirio Gates is a 69 y.o. male with a significant comorbidity of known lung mass, hypertension, type 2 diabetes, class I obesity, pulmonary emphysema. He presents with a new problem of 7 out of 10 back pain. Physical exam findings of bilateral Babinski and hyperreflexia in his lower extremities with increased tone in his lower extremities as well as marked cervical kyphosis.  His imaging shows lytic lesions to L1 and T11 with L1 suggesting canal compromise and violation of the posterior wall of the spinal column on noncontrast CT.    Spinal Metastasis  DDX: Spinal  metastasis most likely, primary spinal column neoplasm, less likely osteoporotic fracture, infection, atypical hemangioma.    70% of Spinal metastases are located within the thoracic spine, 20% in lumbar and 10% cervical with 15% showing epidural compression.    Goals of Care  Palliative (pain reduction)  Improve QOL (ambulation, survival)  Oncological Cure  Stabilization  Diagnosis    The evaluation of patients with spinal metastasis are complex and must be multidisciplinary and the decision to perform surgery must be based on four key aspects of the patient's status: Medical fitness, clinical presentation, oncological status, and feasibility of surgical treatment.    Medical fitness  Porfirio has the following patient factors that have been found to be related to poor surgical outcome: None.  Additionally the patient has an ECOG of 0 and a KPS of 90.  Based on this Porfirio is a good surgical candidate.    Clinical Presentation  Patients with spinal metastasis typically present with a combination of neurological symptoms, pain, or signs of medical instability.    Porfirio's neurological function is intact but with mechanical back pain.  Based on T2 sagittal imaging the patient's epidural spinal cord compression (ESCC) based on the Spinal Oncology Study Group (SOSG) grading scale is Grade 2 = spinal cord compression but cerebrospinal fluid is visible.        83 to 95% of patients typically present with pain and this typically precedes neurological symptomology.  There are 3 types of pain that affect patients with metastatic disease.  This includes local, radicular, and mechanical.  Porfirio presents with mechanical pain.     Spinal Instability Neoplastic Score (SINS)  Location 3 = Junctional; O-C2; C7-T2; T11-L1; L5-S1   Pain 3 = Mechanical   Bone Lesion 2 = Lytic   Radiographic Spinal Alignment 0 = Normal   Vertebral Body Collapse 1 = No collapse with only 50% of vertebral body involved   Posterior Spinal Element Involvement  1 = Unilateral   Total = 10   Interpretation: SINS 7-12 = Potentially Unstable    Oncological Status  Revised Tokuhashi Score (SPINE 2005)  General Condition 2 = KPS >70   # Extraspinal bone metastases 2 = 1   # Mets to vertebral body 1 = 2 metastases   # Metastases to major internal organs 1 = removal   Primary 0 = Lung, osteosarcoma, GI, bladder, esophalgus, pancreas   Neurological function 2 = Intact (Frankel E)   Total = 8   Tokuhashi Score 0-8.  Recommend Conservative management. Expected survival <6 months    Currently [place] could benefit from Tissue diagnosis and stabilization     Treatment options   (Spinal Oncology Group Recommendations SPINE Vol 34, 22S, 2009)    Steroids - No role currently    Bisphosphonates - Inhibits osteoclasts and may have a direct inhibition on tumor cells.  Reduced risk of pathological fracture (OR 0.6-0.9),hypercalcemia (OR 0.2-0.7), and need for radiation or surgery (OR 0.67-1.0).  But does not change mortality. [employer] 2009.    Radiation - Favorable tumors include Lymphoma, Seminoma, Myeloma.  Intermediate tumors include breast and prostate. Unfavorable NSCLC, Renal, Melanoma, GI and sarcoma.  IMRT, 16Gy in one dose.  85% improvement in pain, 42-90% improvement in neurological function.  0.5% of post radiation myelopathy. SOG recommends to reduce pain and maintain ambulation in patients with sensitive disease without instability.    Embolization - Usually only indicated in renal cell or thyroid carcinoma as a preoperative intervention to reduce surgical blood loss by 60%.    Kyphoplasty with OsteoCool - Provides biopsy, pain control, stabilization, and palliative treatment in poor surgical candidates.  Contraindicated in patients with compression lesions.  In a study of 204 patients there was a 0.5% incidence of medical complications, 0% incidence of neurological compromise, 12.1% chance of extravasation, 0% chance of painful extravasation, and a 2.9%  chance of adjacent vertebral fracture. In a separate study of 143 vertebrae (89 thoracic spine, 53 lumbar spine, and 1 sacral spine) local control rates of 97.1, 95.9, and 94.2% at 6, 12, and 24 months with median follow-up was 14.5 months (range 0.4-109). Local progression occurred in 10 patients (7%) after a median time of 22.3 months. Radiat Oncol. 2020 Nov 12;15(1):263. SOG recommends in patients with painful compression fractures secondary to metastatic disease (moderate quality evidence).    Surgical decompression with or without stabilization. Indications for surgery include diagnosis, stability, neurological decline with loss of ambulation less than 48 hrs, or epidural compression. Surgical decompression and radiation improve maintenance of continence (156 vs 17 days), ambulation/LAST score (566 vs 72 days), and survival (126 vs 100 days) over radiation alone in patients with epidural compression without radiosensitive tumor, not paralyzed >48 hours, without brain mets, or <3 months survival.  Jackelineell et al, LANCET 2005, 366:643-48.  SOG recommends surgery in patients with high-grade epidural spinal cord compression (Strong recommendation with moderate quality evidence)    Treatment Algorithm  I tend to utilize to algorithms.  Based on the algorithm for spinal metastasis developed and prospectively applied by [facility] in [facility] since 2002 and the neurologic, oncological, mechanical, and systemic (NOMS) decision framework utilized over the last 15 years and [facility].          Kristopher MORALES, et al. Evaluation of the Metastatic Spine Disease Multidisciplinary Working Group Algorithms as Part of a Multidisciplinary Spine Tumor Conference. Global Spine J. 2020 Oct;10(7):888-895.      Based on these SOG Recommendations Porfirio and GENEVIEVE had an extensive discussion regarding the risk, benefits, and possible complications regarding observation, needle biopsy, Kyphoplasty, OsteoCool, resection  and stabilization, SRS, SBRT, and chemotherapy in various combinations.         Assessment & Plan  1. Back Pain   Lytic lesion at L1 and T11 consistent with metastatic disease to the spine.   Suspected neoplasm cancer to the spine.  Back pain is reported, primarily on the right side, worsening with prolonged standing. Imaging studies, including MRI of the thoracic spine, reveal lesions at T11, L1, and L2. The lesion at L1 is causing pressure on the spinal cord, while the T11 lesion is not causing significant issues. Various treatment options were discussed, including conservative therapy, biopsy, radiation, and combined biopsy and stabilization surgery. Given the proximity of the L1 lesion to the nerve roots, radiation is not recommended at this time. The patient prefers to proceed with a biopsy first. An open T11 biopsy will be scheduled within the week, followed by a T10-L3 corpectomy within 2 weeks if the biopsy confirms cancer. Post-surgery, chemotherapy and radiation will be considered based on the biopsy results and the patients relatively good prognosis. Risks of the biopsy include potential damage to the spinal cord, bleeding after surgery, and spreading the cancer. The major surgery will involve removing the lesion, stabilizing the spine with screws and rods, and placing a titanium cage to maintain spinal stability. Post-surgery recovery will include a hospital stay of approximately one week, followed by inpatient physical therapy if needed. The patient will be advised to avoid bending, lifting, and twisting for 2-4 weeks post-surgery. Chemotherapy and radiation will likely begin 2 weeks after surgery.    2. Cervical kyphosis  MRI of the cervical spine shows no evidence of cancer or osseous metastatic lesions but indicates a healed fracture at the right C2-C3 facet, likely due to past trauma. Degenerative disk disease is present, but no emergent issues with the neck.     Diabetes mellitus.  Ozempic  injections have been resumed approximately 2-3 months ago to manage diabetes and aid in weight loss. The patient previously lost about 40 pounds on Ozempic before discontinuing it. Continued use of Ozempic is recommended to manage blood glucose levels and support weight loss efforts. Potential side effects of Ozempic include gastrointestinal symptoms such as nausea, vomiting, and diarrhea. The patient should monitor blood glucose levels regularly and report any significant changes or adverse effects. Regular follow-up appointments will be necessary to assess the effectiveness of the treatment and make any necessary adjustments.          Diagnoses and all orders for this visit:    1. 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)  -     Case Request; Standing  -     CBC (No Diff); Future  -     Comprehensive Metabolic Panel; Future  -     MRSA Screen Culture (Outpatient) - Swab, Nares; Future  -     Type & Screen; Future  -     ECG 12 Lead; Future  -     XR Chest 1 View; Future  -     chlorhexidine (PERIDEX) 0.12 % solution 15 mL  -     ceFAZolin (ANCEF) 2 g in sodium chloride 0.9 % 100 mL IVPB  -     Case Request  -     Case Request; Standing  -     CBC & Differential; Future  -     Comprehensive Metabolic Panel; Future  -     Urinalysis without microscopic (no culture) - Urine, Clean Catch; Future  -     Vitamin D 25 Hydroxy; Future  -     MRSA Screen Culture (Outpatient) - Swab, Nares; Future  -     Type & Screen; Future  -     ECG 12 Lead; Future  -     XR Chest 1 View; Future  -     ceFAZolin (ANCEF) 2 g in sodium chloride 0.9 % 100 mL IVPB  -     Case Request    2. Tobacco abuse    3. Other secondary kyphosis, cervical region    4. 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  -     Vitamin D 25 Hydroxy; Future    Other orders  -     Outpatient In A Bed; Standing  -     Follow Anesthesia Guidelines / Protocol; Future  -     Follow Anesthesia Guidelines / Protocol; Standing  -      Verify NPO Status; Standing  -     SCD (Sequential Compression Device) - Place on Patient in Pre-Op; Standing  -     Have Patient Void Prior to Procedure; Standing  -     Verify / Perform Chlorhexidine Skin Prep; Standing  -     Provide NPO Instructions to Patient; Future  -     Chlorhexidine Skin Prep; Future  -     Provide Patient with Enhanced Recovery Booklet(s) or Handout; Future  -     Inpatient Admission; Standing  -     Follow Anesthesia Guidelines / Protocol; Future  -     Follow Anesthesia Guidelines / Protocol; Standing  -     Verify NPO Status; Standing  -     SCD (Sequential Compression Device) - Place on Patient in Pre-Op; Standing  -     Verify / Perform Chlorhexidine Skin Prep; Standing  -     Insert Indwelling Urinary Catheter; Standing  -     Assess Need for Indwelling Urinary Catheter - Follow Removal Protocol; Standing  -     Urinary Catheter Care; Standing  -     Provide NPO Instructions to Patient; Future  -     Chlorhexidine Skin Prep; Future  -     Provide Patient with Enhanced Recovery Booklet(s) or Handout; Future  -     Prepare RBC, 2 Units; Standing  -     Chlorhexidine Gluconate 4 % solution; Shower each day with solution for 5 days beginning 5 days before surgery.  Dispense: 120 mL; Refill: 0          Return for POSTOPERATIVELY.    Thank you for this Consultation and the opportunity to participate in Porfirio's care.    Sincerely,  Endy Marte MD    I spent 61 minutes caring for Porfirio on this date of service. This time includes time spent by me in the following activities: preparing for the visit, reviewing tests, obtaining and/or reviewing a separately obtained history, performing a medically appropriate examination and/or evaluation, counseling and educating the patient/family/caregiver, ordering medications, tests, or procedures, referring and communicating with other health care professionals, documenting information in the medical record, independently interpreting results  and communicating that information with the patient/family/caregiver, and/or care coordination.

## 2025-07-14 NOTE — H&P (VIEW-ONLY)
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 Patient had MRI of lumbar,MRI of thoracic spine on 07/2025 at [facility]. Patient had CT of cervical spine on 07/10/2025 at [facility].       History of Present Illness    Oncology/Hematology History    No history exists.         History of Present Illness  The patient presents for evaluation of back pain. He is accompanied by his daughter.    He was referred to our clinic by Dr. Saul due to a spot on his lung, which was discovered during a CT scan for his back pain. The scan was initially ordered to investigate potential kidney stones. A subsequent scan was performed last week under the order of Dr. Graff. His primary concern, however, is the presence of spots on his spine. He reports no significant weight changes. He has a history of ruptured discs in his neck and lower back, but he has not sought surgical intervention.    The back pain is predominantly localized to the right side and does not radiate to the groin or thigh. He recalls an instance where the pain extended around his abdomen. He reports no leg weakness, falls, numbness, or tingling. He experiences constipation and difficulty urinating in the mornings. His PSA levels were elevated, prompting a referral to a urologist. The back pain intensifies when he remains standing for extended periods.    He is diabetic and administers Mounjaro injections weekly but does not use insulin.    Social History:    Occupations: Construction    Tobacco: He smokes cigarettes, currently about two cigarettes a day.    FAMILY HISTORY  His brother had lung cancer.      The patient presents for a follow-up of suspected neoplasm cancer to the spine, accompanied by his  daughter.    He reports intermittent back pain, which is currently absent. The pain typically manifests after prolonged periods of standing and is primarily localized to the right side of his back, occasionally radiating to the side. He does not experience any exacerbation of pain with bending over and can manage the discomfort by finding a suitable sitting or lying position. He has not undergone a biopsy yet. He recalls a previous discussion about the potential need for metal pins and the inability to use radiation due to its proximity to the spine. He is concerned about the possibility of cancer and the necessity of surgery.    Additionally, he mentions that his head has been tilted to one side for approximately 6 months. He reports no bowel or bladder issues, including incontinence. He also reports no numbness or tingling in his legs. He has not experienced any recent falls.    He has resumed his Ozempic injections for diabetes management, which he had previously discontinued after losing 40 pounds.    Social History:    Occupations: Construction    FAMILY HISTORY  His mother had a hysterectomy and underwent radiation treatments in her 50s.    Alternative therapies include ibuprofen for at least 3 months without significant relief.  He has avoided all narcotics.    ECOG: (0) Fully Active - Able to Carry On All Pre-disease Performance Without Restriction  KPS: 100: Normal; no evidence of disease      Review of Systems   Constitutional: Negative.    HENT: Negative.     Eyes: Negative.    Respiratory: Negative.     Cardiovascular: Negative.    Gastrointestinal: Negative.    Endocrine: Negative.    Genitourinary: Negative.    Musculoskeletal:  Positive for back pain.   Skin: Negative.    Allergic/Immunologic: Negative.    Neurological: Negative.    Hematological: Negative.    Psychiatric/Behavioral: Negative.         Past Medical History:   Diagnosis Date    Diabetes type 2     Hypertension     Kidney stones         Past Surgical History:   Procedure Laterality Date    ADENOIDECTOMY      CARDIAC CATHETERIZATION N/A 9/6/2023    Procedure: Left Heart Cath;  Surgeon: Melvin Peterson MD;  Location:  [facility] CATH INVASIVE LOCATION;  Service: Cardiology;  Laterality: N/A;    CHOLECYSTECTOMY      CYSTOSCOPY URETEROSCOPY LASER LITHOTRIPSY Left 3/10/2017    Procedure: CYSTOSCOPY URETEROSCOPY LASER LITHOTRIPSY;  Surgeon: Neftali Pereira MD;  Location:  [facility] OR;  Service:     CYSTOSCOPY W/ URETERAL STENT PLACEMENT Left 3/10/2017    Procedure: CYSTOSCOPY URETERAL CATHETER/STENT INSERTION LEFT URETER;  Surgeon: Neftali Pereira MD;  Location:  [facility] OR;  Service:        Family History: family history includes Cancer in his brother; Colon cancer in his father; Diabetes in his father; Heart attack in his father; No Known Problems in his mother.    Social History:  reports that he has been smoking cigarettes. He has been exposed to tobacco smoke. He has quit using smokeless tobacco. He reports that he does not drink alcohol and does not use drugs.    Medications:    Current Outpatient Medications:     aspirin 81 MG EC tablet, Take 1 tablet by mouth Daily., Disp: , Rfl:     atorvastatin (LIPITOR) 40 MG tablet, Take 1 tablet by mouth Every Night., Disp: 90 tablet, Rfl: 2    clotrimazole-betamethasone (LOTRISONE) 1-0.05 % cream, Apply 1 Application topically to the appropriate area as directed 2 (Two) Times a Day., Disp: 45 g, Rfl: 0    ibuprofen (ADVIL,MOTRIN) 200 MG tablet, Take 1 tablet by mouth Every 6 (Six) Hours As Needed for Mild Pain., Disp: , Rfl:     losartan (COZAAR) 25 MG tablet, TAKE 1 TABLET BY MOUTH EVERY DAY, Disp: 30 tablet, Rfl: 2    metFORMIN ER (GLUCOPHAGE-XR) 500 MG 24 hr tablet, TAKE 1 TABLET BY MOUTH EVERY DAY WITH BREAKFAST, Disp: 30 tablet, Rfl: 2    Semaglutide,0.25 or 0.5MG/DOS, (Ozempic, 0.25 or 0.5 MG/DOSE,) 2 MG/3ML solution pen-injector, Inject 0.25 mg under the skin into the appropriate area as directed 1 (One)  Time Per Week for 28 days, THEN 0.5 mg 1 (One) Time Per Week for 28 days., Disp: 3 mL, Rfl: 0    Chlorhexidine Gluconate 4 % solution, Shower each day with solution for 5 days beginning 5 days before surgery., Disp: 120 mL, Rfl: 0    Allergies:  Patient has no known allergies.    Objective   Physical Exam  Eyes:      General: Lids are normal.      Extraocular Movements: Extraocular movements intact.      Pupils: Pupils are equal, round, and reactive to light.   Neurological:      Coordination: Coordination is intact.      Deep Tendon Reflexes:      Reflex Scores:       Tricep reflexes are 2+ on the right side and 2+ on the left side.       Bicep reflexes are 2+ on the right side and 2+ on the left side.       Brachioradialis reflexes are 2+ on the right side and 2+ on the left side.       Patellar reflexes are 3+ on the right side and 3+ on the left side.       Achilles reflexes are 0 on the right side and 0 on the left side.  Psychiatric:         Speech: Speech normal.       Neurological Exam  Mental Status  Awake, alert and oriented to person, place and time. Speech is normal. Language is fluent with no aphasia. Attention and concentration are normal.    Cranial Nerves  CN II: Visual acuity is normal.  CN III, IV, VI: Extraocular movements intact bilaterally. Normal lids and orbits bilaterally. Pupils equal round and reactive to light bilaterally.  CN V: Facial sensation is normal.  CN VII: Full and symmetric facial movement.  CN IX, X: Palate elevates symmetrically  CN XI: Shoulder shrug strength is normal.    Motor  Normal muscle bulk throughout. Increased muscle tone. BLE.                                               Right                     Left  Toe extension                        5                          5                                             Right                     Left  Deltoid                                   5                          5   Biceps                                   5                           5   Triceps                                  5                          5   Wrist extensor                       5                          5   Finger flexor                          5                          5   Iliopsoas                               5                          5   Quadriceps                           5                          5   Gastrocnemius                     5                           5   Anterior tibialis                      5                          5    Sensory  Light touch is normal in upper and lower extremities.     Reflexes                                            Right                      Left  Brachioradialis                    2+                         2+  Biceps                                 2+                         2+  Triceps                                2+                         2+  Patellar                                3+                         3+  Achilles                                0                         0  Right Plantar: upgoing  Left Plantar: upgoing    Right pathological reflexes: Shanique's absent. Ankle clonus absent.  Left pathological reflexes: Shanique's absent. Ankle clonus absent.    Coordination    Finger-to-nose, rapid alternating movements and heel-to-shin normal bilaterally without dysmetria.    Gait  Casual gait is normal including stance, stride, and arm swing.  Marked progressive cervical kyphosis..    Male  strength (pounds)  AGE Right Hand RH Norms Left Hand LH Norms   20-24  121+20.6  104+21.8   25-29  120+23.0  110+16.2   30-34  121+22.4  110+21.7   35-39  119+24  113+21.7   40-44  117+20.7  112+18.7   45-49  110+23.0  101+22.8   50-54  113+18.1  102+17   55-59  101+26.7  83+23.4   60-64  90+20.4  77+20.3   65-69 105 91+20.6 95 76.8+19.8   70-74  75+21.5  65+18.1   75+  66+21.0  55+17.0   (AKI Tapia et al; Hand Dynometer: Effects of trials and sessions.  Perpetual and Motor Skills 61:195-8, 1985)  Key  * =  Dominant hand  > = Intervention        Imaging: (independent review and interpretation)  MRI Lumbar Spine With & Without Contrast  Result Date: 7/10/2025  1. T2 hyperintense lesion posteriorly on the right at L1 with bowing of the posterior cortex causing moderate central canal stenosis and narrows the right neural foramen. 2. Similar-appearing T2 hyperintense lesions at the right posterior superior endplate of L2 and right lamina T11.   This report was signed and finalized on 7/10/2025 6:58 PM by Oleksandr Gama.      CT Cervical Spine Without Contrast  Result Date: 7/1/2025  1. Greatest spinal canal stenosis appears mild at C5-6 and C6-7. 2. Multilevel severe foraminal stenoses, predominantly RIGHT more than LEFT, as described in detail above. Other level by level findings as above. 3. No evidence of bony metastatic disease by CT.  This report was signed and finalized on 7/1/2025 11:02 AM by Dr Claire Cornelius MD.      MRI Cervical Spine With & Without Contrast  Result Date: 7/1/2025  1. No osseous metastatic disease identified in the cervical spine. 2. Multilevel cervical spine osteoarthritis change, as detailed above. No high-grade spinal stenosis. No compressive myelopathy or myelomalacia.    This report was signed and finalized on 7/1/2025 10:52 AM by Dr Adriano Carroll.      MRI Thoracic Spine With & Without Contrast  Result Date: 7/1/2025  Impression: 1. Osseous metastatic disease at the T11, L1 and L2 vertebral levels. 2. Known metastatic lesion at L1 (from recent chest CT) involves the vertebral body and right-sided posterior elements and there is extraosseous soft tissue mass within the spinal canal. This results in a moderate spinal stenosis and there is also a pathologic fracturing with minimal loss of height along the inferior endplate. 3. T11 lesion is centered in the posterior elements right of midline and there is some associated extraosseous soft tissue mass although there is no actual soft tissue  mass within the spinal canal at this level.  This report was signed and finalized on 7/1/2025 10:38 AM by Dr Adriano Carroll.      CT Chest Without Contrast Diagnostic  Result Date: 6/17/2025  1. Solid irregular mass at the right upper lobe consistent with neoplasm. 2. Osseous metastasis with lytic destructive lesion on the right posteriorly at L1 with epidural extension of tumor causing at least moderate central canal stenosis. Clinical correlation is recommended and MRI could be performed for further evaluation. There is also lytic destructive lesion involving the right posterior elements of T11 and proximal left clavicle.  This report was signed and finalized on 6/17/2025 12:04 PM by Oleksandr Gama.      Results  Imaging   - CT scan of the neck: 2023, T   - CT scan of the spine: 2023, Lesion at L1 causing compression on the spinal cord, reducing its diameter from 17 mm to 7 mm. A lytic lesion is also present at T11.      Imaging   - CT scan of the chest: Two areas of concern.   - MRI of the thoracic spine with and without contrast: Three areas of concern at T11, L1, and L2. The lesion at T11 is not causing pressure on the spinal cord. The lesion at L1 is starting to cause a problem. The part at L2 is just a small blip and not much of a concern.   - MRI of the cervical spine: No evidence of cancer or osseous metastatic lesions. Indicates a healed fracture at the right C2, C3 facet and degenerative disk disease.                        ASSESSMENT and PLAN  Porfirio Gates is a 69 y.o. male with a significant comorbidity of known lung mass, hypertension, type 2 diabetes, class I obesity, pulmonary emphysema. He presents with a new problem of 7 out of 10 back pain. Physical exam findings of bilateral Babinski and hyperreflexia in his lower extremities with increased tone in his lower extremities as well as marked cervical kyphosis.  His imaging shows lytic lesions to L1 and T11 with L1 suggesting canal compromise  and violation of the posterior wall of the spinal column on noncontrast CT.    Spinal Metastasis  DDX: Spinal metastasis most likely, primary spinal column neoplasm, less likely osteoporotic fracture, infection, atypical hemangioma.    70% of Spinal metastases are located within the thoracic spine, 20% in lumbar and 10% cervical with 15% showing epidural compression.    Goals of Care  Palliative (pain reduction)  Improve QOL (ambulation, survival)  Oncological Cure  Stabilization  Diagnosis    The evaluation of patients with spinal metastasis are complex and must be multidisciplinary and the decision to perform surgery must be based on four key aspects of the patient's status: Medical fitness, clinical presentation, oncological status, and feasibility of surgical treatment.    Medical fitness  Porfirio has the following patient factors that have been found to be related to poor surgical outcome: None.  Additionally the patient has an ECOG of 0 and a KPS of 90.  Based on this Porfirio is a good surgical candidate.    Clinical Presentation  Patients with spinal metastasis typically present with a combination of neurological symptoms, pain, or signs of medical instability.    Porfirio's neurological function is intact but with mechanical back pain.  Based on T2 sagittal imaging the patient's epidural spinal cord compression (ESCC) based on the Spinal Oncology Study Group (SOSG) grading scale is Grade 2 = spinal cord compression but cerebrospinal fluid is visible.        83 to 95% of patients typically present with pain and this typically precedes neurological symptomology.  There are 3 types of pain that affect patients with metastatic disease.  This includes local, radicular, and mechanical.  Porfirio presents with mechanical pain.     Spinal Instability Neoplastic Score (SINS)  Location 3 = Junctional; O-C2; C7-T2; T11-L1; L5-S1   Pain 3 = Mechanical   Bone Lesion 2 = Lytic   Radiographic Spinal Alignment 0 = Normal   Vertebral  Body Collapse 1 = No collapse with only 50% of vertebral body involved   Posterior Spinal Element Involvement 1 = Unilateral   Total = 10   Interpretation: SINS 7-12 = Potentially Unstable    Oncological Status  Revised Tokuhashi Score (SPINE 2005)  General Condition 2 = KPS >70   # Extraspinal bone metastases 2 = 1   # Mets to vertebral body 1 = 2 metastases   # Metastases to major internal organs 1 = removal   Primary 0 = Lung, osteosarcoma, GI, bladder, esophalgus, pancreas   Neurological function 2 = Intact (Frankel E)   Total = 8   Tokuhashi Score 0-8.  Recommend Conservative management. Expected survival <6 months    Currently [place] could benefit from Tissue diagnosis and stabilization     Treatment options   (Spinal Oncology Group Recommendations SPINE Vol 34, 22S, 2009)    Steroids - No role currently    Bisphosphonates - Inhibits osteoclasts and may have a direct inhibition on tumor cells.  Reduced risk of pathological fracture (OR 0.6-0.9),hypercalcemia (OR 0.2-0.7), and need for radiation or surgery (OR 0.67-1.0).  But does not change mortality. [email or web address] 2009.    Radiation - Favorable tumors include Lymphoma, Seminoma, Myeloma.  Intermediate tumors include breast and prostate. Unfavorable NSCLC, Renal, Melanoma, GI and sarcoma.  IMRT, 16Gy in one dose.  85% improvement in pain, 42-90% improvement in neurological function.  0.5% of post radiation myelopathy. SOG recommends to reduce pain and maintain ambulation in patients with sensitive disease without instability.    Embolization - Usually only indicated in renal cell or thyroid carcinoma as a preoperative intervention to reduce surgical blood loss by 60%.    Kyphoplasty with OsteoCool - Provides biopsy, pain control, stabilization, and palliative treatment in poor surgical candidates.  Contraindicated in patients with compression lesions.  In a study of 204 patients there was a 0.5% incidence of medical complications, 0% incidence  of neurological compromise, 12.1% chance of extravasation, 0% chance of painful extravasation, and a 2.9% chance of adjacent vertebral fracture. In a separate study of 143 vertebrae (89 thoracic spine, 53 lumbar spine, and 1 sacral spine) local control rates of 97.1, 95.9, and 94.2% at 6, 12, and 24 months with median follow-up was 14.5 months (range 0.4-109). Local progression occurred in 10 patients (7%) after a median time of 22.3 months. Radiat Oncol. 2020 Nov 12;15(1):263. SOG recommends in patients with painful compression fractures secondary to metastatic disease (moderate quality evidence).    Surgical decompression with or without stabilization. Indications for surgery include diagnosis, stability, neurological decline with loss of ambulation less than 48 hrs, or epidural compression. Surgical decompression and radiation improve maintenance of continence (156 vs 17 days), ambulation/LAST score (566 vs 72 days), and survival (126 vs 100 days) over radiation alone in patients with epidural compression without radiosensitive tumor, not paralyzed >48 hours, without brain mets, or <3 months survival.  Ninfa et al, LANCET 2005, 366:643-48.  SOG recommends surgery in patients with high-grade epidural spinal cord compression (Strong recommendation with moderate quality evidence)    Treatment Algorithm  I tend to utilize to algorithms.  Based on the algorithm for spinal metastasis developed and prospectively applied by [facility] in [facility] since 2002 and the neurologic, oncological, mechanical, and systemic (NOMS) decision framework utilized over the last 15 years and [facility].          Kristopher MORALES et al. Evaluation of the Metastatic Spine Disease Multidisciplinary Working Group Algorithms as Part of a Multidisciplinary Spine Tumor Conference. Global Spine J. 2020 Oct;10(7):888-895.      Based on these SOG Recommendations Porfirio and GENEVIEVE had an extensive discussion regarding the risk,  benefits, and possible complications regarding observation, needle biopsy, Kyphoplasty, OsteoCool, resection and stabilization, SRS, SBRT, and chemotherapy in various combinations.         Assessment & Plan  1. Back Pain   Lytic lesion at L1 and T11 consistent with metastatic disease to the spine.   Suspected neoplasm cancer to the spine.  Back pain is reported, primarily on the right side, worsening with prolonged standing. Imaging studies, including MRI of the thoracic spine, reveal lesions at T11, L1, and L2. The lesion at L1 is causing pressure on the spinal cord with epidural compression, while the T11 lesion is not causing significant issues at this time but the tumor has invaded the right facet. Various treatment options were discussed, including conservative therapy, biopsy, radiation, and combined biopsy and stabilization surgery. Given the proximity of the L1 lesion to the nerve roots and cauda equina, radiation is not recommended at this time. The patient prefers to proceed with a biopsy first. An open T11 biopsy will be scheduled within the week, followed by a T10-L3 instrumented fusion and L1 corpectomy within 2 weeks if the biopsy confirms cancer. Post-surgery, chemotherapy and radiation will be considered based on the biopsy results and the patients relatively good prognosis. Risks of the biopsy include potential damage to the spinal cord, bleeding after surgery, and spreading the cancer. The major surgery will involve removing the lesion, stabilizing the spine with screws and rods, and placing a titanium cage to maintain spinal stability. Post-surgery recovery will include a hospital stay of approximately one week, followed by inpatient physical therapy if needed. The patient will be advised to avoid bending, lifting, and twisting for 2-4 weeks post-surgery. Chemotherapy and radiation will likely begin 2 weeks after surgery.    per the  IP code list the specific codes that are on the list and  that's why you requested IP status for this patient.     2. Cervical kyphosis  MRI of the cervical spine shows no evidence of cancer or osseous metastatic lesions but indicates a healed fracture at the right C2-C3 facet, likely due to past trauma. Degenerative disk disease is present, but no emergent issues with the neck.     Diabetes mellitus.  Ozempic injections have been resumed approximately 2-3 months ago to manage diabetes and aid in weight loss. The patient previously lost about 40 pounds on Ozempic before discontinuing it. Continued use of Ozempic is recommended to manage blood glucose levels and support weight loss efforts. Potential side effects of Ozempic include gastrointestinal symptoms such as nausea, vomiting, and diarrhea. The patient should monitor blood glucose levels regularly and report any significant changes or adverse effects. Regular follow-up appointments will be necessary to assess the effectiveness of the treatment and make any necessary adjustments.          Diagnoses and all orders for this visit:    1. 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)  -     Case Request; Standing  -     CBC (No Diff); Future  -     Comprehensive Metabolic Panel; Future  -     MRSA Screen Culture (Outpatient) - Swab, Nares; Future  -     Type & Screen; Future  -     ECG 12 Lead; Future  -     XR Chest 1 View; Future  -     chlorhexidine (PERIDEX) 0.12 % solution 15 mL  -     ceFAZolin (ANCEF) 2 g in sodium chloride 0.9 % 100 mL IVPB  -     Case Request  -     Case Request; Standing  -     CBC & Differential; Future  -     Comprehensive Metabolic Panel; Future  -     Urinalysis without microscopic (no culture) - Urine, Clean Catch; Future  -     Vitamin D 25 Hydroxy; Future  -     MRSA Screen Culture (Outpatient) - Swab, Nares; Future  -     Type & Screen; Future  -     ECG 12 Lead; Future  -     XR Chest 1 View; Future  -     ceFAZolin (ANCEF) 2 g in sodium chloride 0.9 %  100 mL IVPB  -     Case Request    2. Tobacco abuse    3. Other secondary kyphosis, cervical region    4. 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  -     Vitamin D 25 Hydroxy; Future    Other orders  -     Outpatient In A Bed; Standing  -     Follow Anesthesia Guidelines / Protocol; Future  -     Follow Anesthesia Guidelines / Protocol; Standing  -     Verify NPO Status; Standing  -     SCD (Sequential Compression Device) - Place on Patient in Pre-Op; Standing  -     Have Patient Void Prior to Procedure; Standing  -     Verify / Perform Chlorhexidine Skin Prep; Standing  -     Provide NPO Instructions to Patient; Future  -     Chlorhexidine Skin Prep; Future  -     Provide Patient with Enhanced Recovery Booklet(s) or Handout; Future  -     Inpatient Admission; Standing  -     Follow Anesthesia Guidelines / Protocol; Future  -     Follow Anesthesia Guidelines / Protocol; Standing  -     Verify NPO Status; Standing  -     SCD (Sequential Compression Device) - Place on Patient in Pre-Op; Standing  -     Verify / Perform Chlorhexidine Skin Prep; Standing  -     Insert Indwelling Urinary Catheter; Standing  -     Assess Need for Indwelling Urinary Catheter - Follow Removal Protocol; Standing  -     Urinary Catheter Care; Standing  -     Provide NPO Instructions to Patient; Future  -     Chlorhexidine Skin Prep; Future  -     Provide Patient with Enhanced Recovery Booklet(s) or Handout; Future  -     Prepare RBC, 2 Units; Standing  -     Chlorhexidine Gluconate 4 % solution; Shower each day with solution for 5 days beginning 5 days before surgery.  Dispense: 120 mL; Refill: 0          Return for POSTOPERATIVELY.    Thank you for this Consultation and the opportunity to participate in Porfirio's care.    Sincerely,  Endy Marte MD    I spent 61 minutes caring for Porfirio on this date of service. This time includes time spent by me in the following activities: preparing for the visit, reviewing tests, obtaining and/or  reviewing a separately obtained history, performing a medically appropriate examination and/or evaluation, counseling and educating the patient/family/caregiver, ordering medications, tests, or procedures, referring and communicating with other health care professionals, documenting information in the medical record, independently interpreting results and communicating that information with the patient/family/caregiver, and/or care coordination.

## 2025-07-14 NOTE — PATIENT INSTRUCTIONS
This week: Open T11 Biopsy  Within 2 weeks: T10-L3 Fusion and L1 Corpectomy    IF YOU SMOKE, VAPE OR USE TOBACCO PLEASE READ THE FOLLOWING:  Why is smoking bad for me?  Smoking increases the risk of heart disease, lung disease, vascular disease, stroke, and cancer. If you smoke, STOP!        IF YOU SMOKE, VAPE OR USE TOBACCO PLEASE READ THE FOLLOWING:  Why is smoking bad for me?  Smoking increases the risk of heart disease, lung disease, vascular disease, stroke, and cancer. If you smoke, STOP!    For more information:  [employer] [place]  1-800-QUIT-NOW  [email or web address]

## 2025-07-14 NOTE — PROGRESS NOTES
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 Patient had MRI of lumbar,MRI of thoracic spine on 07/2025 at [facility]. Patient had CT of cervical spine on 07/10/2025 at [facility].       History of Present Illness    Oncology/Hematology History    No history exists.         History of Present Illness  The patient presents for evaluation of back pain. He is accompanied by his daughter.    He was referred to our clinic by Dr. Saul due to a spot on his lung, which was discovered during a CT scan for his back pain. The scan was initially ordered to investigate potential kidney stones. A subsequent scan was performed last week under the order of Dr. Graff. His primary concern, however, is the presence of spots on his spine. He reports no significant weight changes. He has a history of ruptured discs in his neck and lower back, but he has not sought surgical intervention.    The back pain is predominantly localized to the right side and does not radiate to the groin or thigh. He recalls an instance where the pain extended around his abdomen. He reports no leg weakness, falls, numbness, or tingling. He experiences constipation and difficulty urinating in the mornings. His PSA levels were elevated, prompting a referral to a urologist. The back pain intensifies when he remains standing for extended periods.    He is diabetic and administers Mounjaro injections weekly but does not use insulin.    Social History:    Occupations: Construction    Tobacco: He smokes cigarettes, currently about two cigarettes a day.    FAMILY HISTORY  His brother had lung cancer.      The patient presents for a follow-up of suspected neoplasm cancer to the spine, accompanied by his  daughter.    He reports intermittent back pain, which is currently absent. The pain typically manifests after prolonged periods of standing and is primarily localized to the right side of his back, occasionally radiating to the side. He does not experience any exacerbation of pain with bending over and can manage the discomfort by finding a suitable sitting or lying position. He has not undergone a biopsy yet. He recalls a previous discussion about the potential need for metal pins and the inability to use radiation due to its proximity to the spine. He is concerned about the possibility of cancer and the necessity of surgery.    Additionally, he mentions that his head has been tilted to one side for approximately 6 months. He reports no bowel or bladder issues, including incontinence. He also reports no numbness or tingling in his legs. He has not experienced any recent falls.    He has resumed his Ozempic injections for diabetes management, which he had previously discontinued after losing 40 pounds.    Social History:    Occupations: Construction    FAMILY HISTORY  His mother had a hysterectomy and underwent radiation treatments in her 50s.    Alternative therapies include ibuprofen for at least 3 months without significant relief.  He has avoided all narcotics.    ECOG: (0) Fully Active - Able to Carry On All Pre-disease Performance Without Restriction  KPS: 100: Normal; no evidence of disease      Review of Systems   Constitutional: Negative.    HENT: Negative.     Eyes: Negative.    Respiratory: Negative.     Cardiovascular: Negative.    Gastrointestinal: Negative.    Endocrine: Negative.    Genitourinary: Negative.    Musculoskeletal:  Positive for back pain.   Skin: Negative.    Allergic/Immunologic: Negative.    Neurological: Negative.    Hematological: Negative.    Psychiatric/Behavioral: Negative.         Past Medical History:   Diagnosis Date    Diabetes type 2     Hypertension     Kidney stones         Past Surgical History:   Procedure Laterality Date    ADENOIDECTOMY      CARDIAC CATHETERIZATION N/A 9/6/2023    Procedure: Left Heart Cath;  Surgeon: Melvin Peterson MD;  Location:  [facility] CATH INVASIVE LOCATION;  Service: Cardiology;  Laterality: N/A;    CHOLECYSTECTOMY      CYSTOSCOPY URETEROSCOPY LASER LITHOTRIPSY Left 3/10/2017    Procedure: CYSTOSCOPY URETEROSCOPY LASER LITHOTRIPSY;  Surgeon: Neftali Pereira MD;  Location:  [facility] OR;  Service:     CYSTOSCOPY W/ URETERAL STENT PLACEMENT Left 3/10/2017    Procedure: CYSTOSCOPY URETERAL CATHETER/STENT INSERTION LEFT URETER;  Surgeon: Neftali Pereira MD;  Location:  [facility] OR;  Service:        Family History: family history includes Cancer in his brother; Colon cancer in his father; Diabetes in his father; Heart attack in his father; No Known Problems in his mother.    Social History:  reports that he has been smoking cigarettes. He has been exposed to tobacco smoke. He has quit using smokeless tobacco. He reports that he does not drink alcohol and does not use drugs.    Medications:    Current Outpatient Medications:     aspirin 81 MG EC tablet, Take 1 tablet by mouth Daily., Disp: , Rfl:     atorvastatin (LIPITOR) 40 MG tablet, Take 1 tablet by mouth Every Night., Disp: 90 tablet, Rfl: 2    clotrimazole-betamethasone (LOTRISONE) 1-0.05 % cream, Apply 1 Application topically to the appropriate area as directed 2 (Two) Times a Day., Disp: 45 g, Rfl: 0    ibuprofen (ADVIL,MOTRIN) 200 MG tablet, Take 1 tablet by mouth Every 6 (Six) Hours As Needed for Mild Pain., Disp: , Rfl:     losartan (COZAAR) 25 MG tablet, TAKE 1 TABLET BY MOUTH EVERY DAY, Disp: 30 tablet, Rfl: 2    metFORMIN ER (GLUCOPHAGE-XR) 500 MG 24 hr tablet, TAKE 1 TABLET BY MOUTH EVERY DAY WITH BREAKFAST, Disp: 30 tablet, Rfl: 2    Semaglutide,0.25 or 0.5MG/DOS, (Ozempic, 0.25 or 0.5 MG/DOSE,) 2 MG/3ML solution pen-injector, Inject 0.25 mg under the skin into the appropriate area as directed 1 (One)  Time Per Week for 28 days, THEN 0.5 mg 1 (One) Time Per Week for 28 days., Disp: 3 mL, Rfl: 0    Chlorhexidine Gluconate 4 % solution, Shower each day with solution for 5 days beginning 5 days before surgery., Disp: 120 mL, Rfl: 0    Allergies:  Patient has no known allergies.    Objective   Physical Exam  Eyes:      General: Lids are normal.      Extraocular Movements: Extraocular movements intact.      Pupils: Pupils are equal, round, and reactive to light.   Neurological:      Coordination: Coordination is intact.      Deep Tendon Reflexes:      Reflex Scores:       Tricep reflexes are 2+ on the right side and 2+ on the left side.       Bicep reflexes are 2+ on the right side and 2+ on the left side.       Brachioradialis reflexes are 2+ on the right side and 2+ on the left side.       Patellar reflexes are 3+ on the right side and 3+ on the left side.       Achilles reflexes are 0 on the right side and 0 on the left side.  Psychiatric:         Speech: Speech normal.       Neurological Exam  Mental Status  Awake, alert and oriented to person, place and time. Speech is normal. Language is fluent with no aphasia. Attention and concentration are normal.    Cranial Nerves  CN II: Visual acuity is normal.  CN III, IV, VI: Extraocular movements intact bilaterally. Normal lids and orbits bilaterally. Pupils equal round and reactive to light bilaterally.  CN V: Facial sensation is normal.  CN VII: Full and symmetric facial movement.  CN IX, X: Palate elevates symmetrically  CN XI: Shoulder shrug strength is normal.    Motor  Normal muscle bulk throughout. Increased muscle tone. BLE.                                               Right                     Left  Toe extension                        5                          5                                             Right                     Left  Deltoid                                   5                          5   Biceps                                   5                           5   Triceps                                  5                          5   Wrist extensor                       5                          5   Finger flexor                          5                          5   Iliopsoas                               5                          5   Quadriceps                           5                          5   Gastrocnemius                     5                           5   Anterior tibialis                      5                          5    Sensory  Light touch is normal in upper and lower extremities.     Reflexes                                            Right                      Left  Brachioradialis                    2+                         2+  Biceps                                 2+                         2+  Triceps                                2+                         2+  Patellar                                3+                         3+  Achilles                                0                         0  Right Plantar: upgoing  Left Plantar: upgoing    Right pathological reflexes: Shanique's absent. Ankle clonus absent.  Left pathological reflexes: Shanique's absent. Ankle clonus absent.    Coordination    Finger-to-nose, rapid alternating movements and heel-to-shin normal bilaterally without dysmetria.    Gait  Casual gait is normal including stance, stride, and arm swing.  Marked progressive cervical kyphosis..    Male  strength (pounds)  AGE Right Hand RH Norms Left Hand LH Norms   20-24  121+20.6  104+21.8   25-29  120+23.0  110+16.2   30-34  121+22.4  110+21.7   35-39  119+24  113+21.7   40-44  117+20.7  112+18.7   45-49  110+23.0  101+22.8   50-54  113+18.1  102+17   55-59  101+26.7  83+23.4   60-64  90+20.4  77+20.3   65-69 105 91+20.6 95 76.8+19.8   70-74  75+21.5  65+18.1   75+  66+21.0  55+17.0   (AKI Tapia et al; Hand Dynometer: Effects of trials and sessions.  Perpetual and Motor Skills 61:195-8, 1985)  Key  * =  Dominant hand  > = Intervention        Imaging: (independent review and interpretation)  MRI Lumbar Spine With & Without Contrast  Result Date: 7/10/2025  1. T2 hyperintense lesion posteriorly on the right at L1 with bowing of the posterior cortex causing moderate central canal stenosis and narrows the right neural foramen. 2. Similar-appearing T2 hyperintense lesions at the right posterior superior endplate of L2 and right lamina T11.   This report was signed and finalized on 7/10/2025 6:58 PM by Oleksandr Gama.      CT Cervical Spine Without Contrast  Result Date: 7/1/2025  1. Greatest spinal canal stenosis appears mild at C5-6 and C6-7. 2. Multilevel severe foraminal stenoses, predominantly RIGHT more than LEFT, as described in detail above. Other level by level findings as above. 3. No evidence of bony metastatic disease by CT.  This report was signed and finalized on 7/1/2025 11:02 AM by Dr Claire Cornelius MD.      MRI Cervical Spine With & Without Contrast  Result Date: 7/1/2025  1. No osseous metastatic disease identified in the cervical spine. 2. Multilevel cervical spine osteoarthritis change, as detailed above. No high-grade spinal stenosis. No compressive myelopathy or myelomalacia.    This report was signed and finalized on 7/1/2025 10:52 AM by Dr Adriano Carroll.      MRI Thoracic Spine With & Without Contrast  Result Date: 7/1/2025  Impression: 1. Osseous metastatic disease at the T11, L1 and L2 vertebral levels. 2. Known metastatic lesion at L1 (from recent chest CT) involves the vertebral body and right-sided posterior elements and there is extraosseous soft tissue mass within the spinal canal. This results in a moderate spinal stenosis and there is also a pathologic fracturing with minimal loss of height along the inferior endplate. 3. T11 lesion is centered in the posterior elements right of midline and there is some associated extraosseous soft tissue mass although there is no actual soft tissue  mass within the spinal canal at this level.  This report was signed and finalized on 7/1/2025 10:38 AM by Dr Adriano Carroll.      CT Chest Without Contrast Diagnostic  Result Date: 6/17/2025  1. Solid irregular mass at the right upper lobe consistent with neoplasm. 2. Osseous metastasis with lytic destructive lesion on the right posteriorly at L1 with epidural extension of tumor causing at least moderate central canal stenosis. Clinical correlation is recommended and MRI could be performed for further evaluation. There is also lytic destructive lesion involving the right posterior elements of T11 and proximal left clavicle.  This report was signed and finalized on 6/17/2025 12:04 PM by Oleksandr Gama.      Results  Imaging   - CT scan of the neck: 2023, T   - CT scan of the spine: 2023, Lesion at L1 causing compression on the spinal cord, reducing its diameter from 17 mm to 7 mm. A lytic lesion is also present at T11.      Imaging   - CT scan of the chest: Two areas of concern.   - MRI of the thoracic spine with and without contrast: Three areas of concern at T11, L1, and L2. The lesion at T11 is not causing pressure on the spinal cord. The lesion at L1 is starting to cause a problem. The part at L2 is just a small blip and not much of a concern.   - MRI of the cervical spine: No evidence of cancer or osseous metastatic lesions. Indicates a healed fracture at the right C2, C3 facet and degenerative disk disease.                        ASSESSMENT and PLAN  Porfirio Gates is a 69 y.o. male with a significant comorbidity of known lung mass, hypertension, type 2 diabetes, class I obesity, pulmonary emphysema. He presents with a new problem of 7 out of 10 back pain. Physical exam findings of bilateral Babinski and hyperreflexia in his lower extremities with increased tone in his lower extremities as well as marked cervical kyphosis.  His imaging shows lytic lesions to L1 and T11 with L1 suggesting canal compromise  and violation of the posterior wall of the spinal column on noncontrast CT.    Spinal Metastasis  DDX: Spinal metastasis most likely, primary spinal column neoplasm, less likely osteoporotic fracture, infection, atypical hemangioma.    70% of Spinal metastases are located within the thoracic spine, 20% in lumbar and 10% cervical with 15% showing epidural compression.    Goals of Care  Palliative (pain reduction)  Improve QOL (ambulation, survival)  Oncological Cure  Stabilization  Diagnosis    The evaluation of patients with spinal metastasis are complex and must be multidisciplinary and the decision to perform surgery must be based on four key aspects of the patient's status: Medical fitness, clinical presentation, oncological status, and feasibility of surgical treatment.    Medical fitness  Porfirio has the following patient factors that have been found to be related to poor surgical outcome: None.  Additionally the patient has an ECOG of 0 and a KPS of 90.  Based on this Porfirio is a good surgical candidate.    Clinical Presentation  Patients with spinal metastasis typically present with a combination of neurological symptoms, pain, or signs of medical instability.    Porfirio's neurological function is intact but with mechanical back pain.  Based on T2 sagittal imaging the patient's epidural spinal cord compression (ESCC) based on the Spinal Oncology Study Group (SOSG) grading scale is Grade 2 = spinal cord compression but cerebrospinal fluid is visible.        83 to 95% of patients typically present with pain and this typically precedes neurological symptomology.  There are 3 types of pain that affect patients with metastatic disease.  This includes local, radicular, and mechanical.  Porfirio presents with mechanical pain.     Spinal Instability Neoplastic Score (SINS)  Location 3 = Junctional; O-C2; C7-T2; T11-L1; L5-S1   Pain 3 = Mechanical   Bone Lesion 2 = Lytic   Radiographic Spinal Alignment 0 = Normal   Vertebral  Body Collapse 1 = No collapse with only 50% of vertebral body involved   Posterior Spinal Element Involvement 1 = Unilateral   Total = 10   Interpretation: SINS 7-12 = Potentially Unstable    Oncological Status  Revised Tokuhashi Score (SPINE 2005)  General Condition 2 = KPS >70   # Extraspinal bone metastases 2 = 1   # Mets to vertebral body 1 = 2 metastases   # Metastases to major internal organs 1 = removal   Primary 0 = Lung, osteosarcoma, GI, bladder, esophalgus, pancreas   Neurological function 2 = Intact (Frankel E)   Total = 8   Tokuhashi Score 0-8.  Recommend Conservative management. Expected survival <6 months    Currently [place] could benefit from Tissue diagnosis and stabilization     Treatment options   (Spinal Oncology Group Recommendations SPINE Vol 34, 22S, 2009)    Steroids - No role currently    Bisphosphonates - Inhibits osteoclasts and may have a direct inhibition on tumor cells.  Reduced risk of pathological fracture (OR 0.6-0.9),hypercalcemia (OR 0.2-0.7), and need for radiation or surgery (OR 0.67-1.0).  But does not change mortality. [employer] MEDIA [employer] 2009.    Radiation - Favorable tumors include Lymphoma, Seminoma, Myeloma.  Intermediate tumors include breast and prostate. Unfavorable NSCLC, Renal, Melanoma, GI and sarcoma.  IMRT, 16Gy in one dose.  85% improvement in pain, 42-90% improvement in neurological function.  0.5% of post radiation myelopathy. SOG recommends to reduce pain and maintain ambulation in patients with sensitive disease without instability.    Embolization - Usually only indicated in renal cell or thyroid carcinoma as a preoperative intervention to reduce surgical blood loss by 60%.    Kyphoplasty with OsteoCool - Provides biopsy, pain control, stabilization, and palliative treatment in poor surgical candidates.  Contraindicated in patients with compression lesions.  In a study of 204 patients there was a 0.5% incidence of medical complications, 0% incidence  of neurological compromise, 12.1% chance of extravasation, 0% chance of painful extravasation, and a 2.9% chance of adjacent vertebral fracture. In a separate study of 143 vertebrae (89 thoracic spine, 53 lumbar spine, and 1 sacral spine) local control rates of 97.1, 95.9, and 94.2% at 6, 12, and 24 months with median follow-up was 14.5 months (range 0.4-109). Local progression occurred in 10 patients (7%) after a median time of 22.3 months. Radiat Oncol. 2020 Nov 12;15(1):263. SOG recommends in patients with painful compression fractures secondary to metastatic disease (moderate quality evidence).    Surgical decompression with or without stabilization. Indications for surgery include diagnosis, stability, neurological decline with loss of ambulation less than 48 hrs, or epidural compression. Surgical decompression and radiation improve maintenance of continence (156 vs 17 days), ambulation/LAST score (566 vs 72 days), and survival (126 vs 100 days) over radiation alone in patients with epidural compression without radiosensitive tumor, not paralyzed >48 hours, without brain mets, or <3 months survival.  Ninfa et al, LANCET 2005, 366:643-48.  SOG recommends surgery in patients with high-grade epidural spinal cord compression (Strong recommendation with moderate quality evidence)    Treatment Algorithm  I tend to utilize to algorithms.  Based on the algorithm for spinal metastasis developed and prospectively applied by [facility] in [facility] since 2002 and the neurologic, oncological, mechanical, and systemic (NOMS) decision framework utilized over the last 15 years and [facility].          Kristopher MORALES et al. Evaluation of the Metastatic Spine Disease Multidisciplinary Working Group Algorithms as Part of a Multidisciplinary Spine Tumor Conference. Global Spine J. 2020 Oct;10(7):888-895.      Based on these SOG Recommendations Porfirio and GENEVIEVE had an extensive discussion regarding the risk,  benefits, and possible complications regarding observation, needle biopsy, Kyphoplasty, OsteoCool, resection and stabilization, SRS, SBRT, and chemotherapy in various combinations.         Assessment & Plan  1. Back Pain   Lytic lesion at L1 and T11 consistent with metastatic disease to the spine.   Suspected neoplasm cancer to the spine.  Back pain is reported, primarily on the right side, worsening with prolonged standing. Imaging studies, including MRI of the thoracic spine, reveal lesions at T11, L1, and L2. The lesion at L1 is causing pressure on the spinal cord with epidural compression, while the T11 lesion is not causing significant issues at this time but the tumor has invaded the right facet. Various treatment options were discussed, including conservative therapy, biopsy, radiation, and combined biopsy and stabilization surgery. Given the proximity of the L1 lesion to the nerve roots and cauda equina, radiation is not recommended at this time. The patient prefers to proceed with a biopsy first. An open T11 biopsy will be scheduled within the week, followed by a T10-L3 instrumented fusion and L1 corpectomy within 2 weeks if the biopsy confirms cancer. Post-surgery, chemotherapy and radiation will be considered based on the biopsy results and the patients relatively good prognosis. Risks of the biopsy include potential damage to the spinal cord, bleeding after surgery, and spreading the cancer. The major surgery will involve removing the lesion, stabilizing the spine with screws and rods, and placing a titanium cage to maintain spinal stability. Post-surgery recovery will include a hospital stay of approximately one week, followed by inpatient physical therapy if needed. The patient will be advised to avoid bending, lifting, and twisting for 2-4 weeks post-surgery. Chemotherapy and radiation will likely begin 2 weeks after surgery.    per the  IP code list the specific codes that are on the list and  that's why you requested IP status for this patient.     2. Cervical kyphosis  MRI of the cervical spine shows no evidence of cancer or osseous metastatic lesions but indicates a healed fracture at the right C2-C3 facet, likely due to past trauma. Degenerative disk disease is present, but no emergent issues with the neck.     Diabetes mellitus.  Ozempic injections have been resumed approximately 2-3 months ago to manage diabetes and aid in weight loss. The patient previously lost about 40 pounds on Ozempic before discontinuing it. Continued use of Ozempic is recommended to manage blood glucose levels and support weight loss efforts. Potential side effects of Ozempic include gastrointestinal symptoms such as nausea, vomiting, and diarrhea. The patient should monitor blood glucose levels regularly and report any significant changes or adverse effects. Regular follow-up appointments will be necessary to assess the effectiveness of the treatment and make any necessary adjustments.          Diagnoses and all orders for this visit:    1. 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)  -     Case Request; Standing  -     CBC (No Diff); Future  -     Comprehensive Metabolic Panel; Future  -     MRSA Screen Culture (Outpatient) - Swab, Nares; Future  -     Type & Screen; Future  -     ECG 12 Lead; Future  -     XR Chest 1 View; Future  -     chlorhexidine (PERIDEX) 0.12 % solution 15 mL  -     ceFAZolin (ANCEF) 2 g in sodium chloride 0.9 % 100 mL IVPB  -     Case Request  -     Case Request; Standing  -     CBC & Differential; Future  -     Comprehensive Metabolic Panel; Future  -     Urinalysis without microscopic (no culture) - Urine, Clean Catch; Future  -     Vitamin D 25 Hydroxy; Future  -     MRSA Screen Culture (Outpatient) - Swab, Nares; Future  -     Type & Screen; Future  -     ECG 12 Lead; Future  -     XR Chest 1 View; Future  -     ceFAZolin (ANCEF) 2 g in sodium chloride 0.9 %  100 mL IVPB  -     Case Request    2. Tobacco abuse    3. Other secondary kyphosis, cervical region    4. 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  -     Vitamin D 25 Hydroxy; Future    Other orders  -     Outpatient In A Bed; Standing  -     Follow Anesthesia Guidelines / Protocol; Future  -     Follow Anesthesia Guidelines / Protocol; Standing  -     Verify NPO Status; Standing  -     SCD (Sequential Compression Device) - Place on Patient in Pre-Op; Standing  -     Have Patient Void Prior to Procedure; Standing  -     Verify / Perform Chlorhexidine Skin Prep; Standing  -     Provide NPO Instructions to Patient; Future  -     Chlorhexidine Skin Prep; Future  -     Provide Patient with Enhanced Recovery Booklet(s) or Handout; Future  -     Inpatient Admission; Standing  -     Follow Anesthesia Guidelines / Protocol; Future  -     Follow Anesthesia Guidelines / Protocol; Standing  -     Verify NPO Status; Standing  -     SCD (Sequential Compression Device) - Place on Patient in Pre-Op; Standing  -     Verify / Perform Chlorhexidine Skin Prep; Standing  -     Insert Indwelling Urinary Catheter; Standing  -     Assess Need for Indwelling Urinary Catheter - Follow Removal Protocol; Standing  -     Urinary Catheter Care; Standing  -     Provide NPO Instructions to Patient; Future  -     Chlorhexidine Skin Prep; Future  -     Provide Patient with Enhanced Recovery Booklet(s) or Handout; Future  -     Prepare RBC, 2 Units; Standing  -     Chlorhexidine Gluconate 4 % solution; Shower each day with solution for 5 days beginning 5 days before surgery.  Dispense: 120 mL; Refill: 0          Return for POSTOPERATIVELY.    Thank you for this Consultation and the opportunity to participate in Porfirio's care.    Sincerely,  Endy Marte MD    I spent 61 minutes caring for Porfirio on this date of service. This time includes time spent by me in the following activities: preparing for the visit, reviewing tests, obtaining and/or  reviewing a separately obtained history, performing a medically appropriate examination and/or evaluation, counseling and educating the patient/family/caregiver, ordering medications, tests, or procedures, referring and communicating with other health care professionals, documenting information in the medical record, independently interpreting results and communicating that information with the patient/family/caregiver, and/or care coordination.

## 2025-07-15 ENCOUNTER — HOSPITAL ENCOUNTER (OUTPATIENT)
Dept: GENERAL RADIOLOGY | Facility: HOSPITAL | Age: 70
Setting detail: HOSPITAL OUTPATIENT SURGERY
Discharge: HOME | End: 2025-07-15
Payer: MEDICARE

## 2025-07-15 ENCOUNTER — TELEPHONE (OUTPATIENT)
Dept: NEUROSURGERY | Facility: CLINIC | Age: 70
End: 2025-07-15
Payer: MEDICARE

## 2025-07-15 ENCOUNTER — ANESTHESIA (OUTPATIENT)
Dept: PERIOP | Facility: HOSPITAL | Age: 70
End: 2025-07-15
Payer: MEDICARE

## 2025-07-15 ENCOUNTER — APPOINTMENT (OUTPATIENT)
Dept: GENERAL RADIOLOGY | Facility: HOSPITAL | Age: 70
End: 2025-07-15
Payer: MEDICARE

## 2025-07-15 ENCOUNTER — HOSPITAL ENCOUNTER (OUTPATIENT)
Facility: HOSPITAL | Age: 70
Setting detail: HOSPITAL OUTPATIENT SURGERY
Discharge: HOME | End: 2025-07-15
Attending: NEUROLOGICAL SURGERY | Admitting: NEUROLOGICAL SURGERY
Payer: MEDICARE

## 2025-07-15 ENCOUNTER — ANESTHESIA EVENT (OUTPATIENT)
Dept: PERIOP | Facility: HOSPITAL | Age: 70
End: 2025-07-15
Payer: MEDICARE

## 2025-07-15 VITALS
TEMPERATURE: 98.4 F | WEIGHT: 218.48 LBS | OXYGEN SATURATION: 95 % | SYSTOLIC BLOOD PRESSURE: 132 MMHG | BODY MASS INDEX: 31.28 KG/M2 | RESPIRATION RATE: 18 BRPM | HEIGHT: 70 IN | DIASTOLIC BLOOD PRESSURE: 81 MMHG | HEART RATE: 83 BPM

## 2025-07-15 DIAGNOSIS — C80.1 MALIGNANT NEOPLASM METASTATIC TO LUMBAR SPINE WITH UNKNOWN PRIMARY SITE: ICD-10-CM

## 2025-07-15 DIAGNOSIS — C79.51 METASTASIS TO SPINAL COLUMN: ICD-10-CM

## 2025-07-15 DIAGNOSIS — C79.51 MALIGNANT NEOPLASM METASTATIC TO LUMBAR SPINE WITH UNKNOWN PRIMARY SITE: ICD-10-CM

## 2025-07-15 DIAGNOSIS — C79.51 METASTATIC CANCER TO SPINE: Primary | ICD-10-CM

## 2025-07-15 DIAGNOSIS — M83.2 ADULT OSTEOMALACIA DUE TO MALABSORPTION: ICD-10-CM

## 2025-07-15 LAB
25(OH)D3 SERPL-MCNC: 19.2 NG/ML (ref 30–100)
ABO GROUP BLD: NORMAL
ALBUMIN SERPL-MCNC: 3.7 G/DL (ref 3.5–5.2)
ALBUMIN/GLOB SERPL: 1 G/DL
ALP SERPL-CCNC: 73 U/L (ref 39–117)
ALT SERPL W P-5'-P-CCNC: 34 U/L (ref 1–41)
ANION GAP SERPL CALCULATED.3IONS-SCNC: 13 MMOL/L (ref 5–15)
AST SERPL-CCNC: 34 U/L (ref 1–40)
BASOPHILS # BLD AUTO: 0.03 10*3/MM3 (ref 0–0.2)
BASOPHILS NFR BLD AUTO: 0.5 % (ref 0–1.5)
BILIRUB SERPL-MCNC: 0.4 MG/DL (ref 0–1.2)
BILIRUB UR QL STRIP: NEGATIVE
BLD GP AB SCN SERPL QL: NEGATIVE
BUN SERPL-MCNC: 16.2 MG/DL (ref 8–23)
BUN/CREAT SERPL: 14.3 (ref 7–25)
CALCIUM SPEC-SCNC: 9.4 MG/DL (ref 8.6–10.5)
CHLORIDE SERPL-SCNC: 103 MMOL/L (ref 98–107)
CLARITY UR: CLEAR
CO2 SERPL-SCNC: 25 MMOL/L (ref 22–29)
COLOR UR: YELLOW
CREAT SERPL-MCNC: 1.13 MG/DL (ref 0.76–1.27)
DEPRECATED RDW RBC AUTO: 40.6 FL (ref 37–54)
EGFRCR SERPLBLD CKD-EPI 2021: 70.8 ML/MIN/1.73
EOSINOPHIL # BLD AUTO: 0.18 10*3/MM3 (ref 0–0.4)
EOSINOPHIL NFR BLD AUTO: 2.9 % (ref 0.3–6.2)
ERYTHROCYTE [DISTWIDTH] IN BLOOD BY AUTOMATED COUNT: 12.9 % (ref 12.3–15.4)
GLOBULIN UR ELPH-MCNC: 3.6 GM/DL
GLUCOSE BLDC GLUCOMTR-MCNC: 114 MG/DL (ref 70–130)
GLUCOSE BLDC GLUCOMTR-MCNC: 118 MG/DL (ref 70–130)
GLUCOSE SERPL-MCNC: 102 MG/DL (ref 65–99)
GLUCOSE UR STRIP-MCNC: NEGATIVE MG/DL
HCT VFR BLD AUTO: 41.3 % (ref 37.5–51)
HGB BLD-MCNC: 13.9 G/DL (ref 13–17.7)
HGB UR QL STRIP.AUTO: ABNORMAL
IMM GRANULOCYTES # BLD AUTO: 0.02 10*3/MM3 (ref 0–0.05)
IMM GRANULOCYTES NFR BLD AUTO: 0.3 % (ref 0–0.5)
KETONES UR QL STRIP: NEGATIVE
LEUKOCYTE ESTERASE UR QL STRIP.AUTO: ABNORMAL
LYMPHOCYTES # BLD AUTO: 1.54 10*3/MM3 (ref 0.7–3.1)
LYMPHOCYTES NFR BLD AUTO: 24.4 % (ref 19.6–45.3)
MCH RBC QN AUTO: 29 PG (ref 26.6–33)
MCHC RBC AUTO-ENTMCNC: 33.7 G/DL (ref 31.5–35.7)
MCV RBC AUTO: 86 FL (ref 79–97)
MONOCYTES # BLD AUTO: 0.58 10*3/MM3 (ref 0.1–0.9)
MONOCYTES NFR BLD AUTO: 9.2 % (ref 5–12)
NEUTROPHILS NFR BLD AUTO: 3.96 10*3/MM3 (ref 1.7–7)
NEUTROPHILS NFR BLD AUTO: 62.7 % (ref 42.7–76)
NITRITE UR QL STRIP: NEGATIVE
NRBC BLD AUTO-RTO: 0 /100 WBC (ref 0–0.2)
PH UR STRIP.AUTO: 6 [PH] (ref 5–8)
PLATELET # BLD AUTO: 183 10*3/MM3 (ref 140–450)
PMV BLD AUTO: 10.2 FL (ref 6–12)
POTASSIUM SERPL-SCNC: 4 MMOL/L (ref 3.5–5.2)
PROT SERPL-MCNC: 7.3 G/DL (ref 6–8.5)
PROT UR QL STRIP: ABNORMAL
QT INTERVAL: 362 MS
QTC INTERVAL: 430 MS
RBC # BLD AUTO: 4.8 10*6/MM3 (ref 4.14–5.8)
RH BLD: POSITIVE
SODIUM SERPL-SCNC: 141 MMOL/L (ref 136–145)
SP GR UR STRIP: 1.02 (ref 1–1.03)
T&S EXPIRATION DATE: NORMAL
UROBILINOGEN UR QL STRIP: ABNORMAL
WBC NRBC COR # BLD AUTO: 6.31 10*3/MM3 (ref 3.4–10.8)

## 2025-07-15 PROCEDURE — 86850 RBC ANTIBODY SCREEN: CPT | Performed by: NEUROLOGICAL SURGERY

## 2025-07-15 PROCEDURE — 76000 FLUOROSCOPY <1 HR PHYS/QHP: CPT

## 2025-07-15 PROCEDURE — 86901 BLOOD TYPING SEROLOGIC RH(D): CPT | Performed by: NEUROLOGICAL SURGERY

## 2025-07-15 PROCEDURE — 80053 COMPREHEN METABOLIC PANEL: CPT | Performed by: NEUROLOGICAL SURGERY

## 2025-07-15 PROCEDURE — 25010000002 DROPERIDOL PER 5 MG: Performed by: NURSE ANESTHETIST, CERTIFIED REGISTERED

## 2025-07-15 PROCEDURE — 25810000003 LACTATED RINGERS PER 1000 ML: Performed by: NEUROLOGICAL SURGERY

## 2025-07-15 PROCEDURE — 86900 BLOOD TYPING SEROLOGIC ABO: CPT | Performed by: NEUROLOGICAL SURGERY

## 2025-07-15 PROCEDURE — 88341 IMHCHEM/IMCYTCHM EA ADD ANTB: CPT | Performed by: NEUROLOGICAL SURGERY

## 2025-07-15 PROCEDURE — 25010000002 SUGAMMADEX 200 MG/2ML SOLUTION: Performed by: NURSE ANESTHETIST, CERTIFIED REGISTERED

## 2025-07-15 PROCEDURE — 25010000002 ONDANSETRON PER 1 MG: Performed by: NURSE ANESTHETIST, CERTIFIED REGISTERED

## 2025-07-15 PROCEDURE — 63046 LAM FACETEC & FORAMOT THRC: CPT | Performed by: NEUROLOGICAL SURGERY

## 2025-07-15 PROCEDURE — 88331 PATH CONSLTJ SURG 1 BLK 1SPC: CPT | Performed by: NEUROLOGICAL SURGERY

## 2025-07-15 PROCEDURE — 93005 ELECTROCARDIOGRAM TRACING: CPT | Performed by: NEUROLOGICAL SURGERY

## 2025-07-15 PROCEDURE — 88311 DECALCIFY TISSUE: CPT | Performed by: NEUROLOGICAL SURGERY

## 2025-07-15 PROCEDURE — 82306 VITAMIN D 25 HYDROXY: CPT | Performed by: NEUROLOGICAL SURGERY

## 2025-07-15 PROCEDURE — 88307 TISSUE EXAM BY PATHOLOGIST: CPT | Performed by: NEUROLOGICAL SURGERY

## 2025-07-15 PROCEDURE — 87081 CULTURE SCREEN ONLY: CPT | Performed by: NEUROLOGICAL SURGERY

## 2025-07-15 PROCEDURE — 88342 IMHCHEM/IMCYTCHM 1ST ANTB: CPT | Performed by: NEUROLOGICAL SURGERY

## 2025-07-15 PROCEDURE — 71045 X-RAY EXAM CHEST 1 VIEW: CPT

## 2025-07-15 PROCEDURE — 25010000002 CEFAZOLIN PER 500 MG: Performed by: NEUROLOGICAL SURGERY

## 2025-07-15 PROCEDURE — 25010000002 PROPOFOL 10 MG/ML EMULSION: Performed by: NURSE ANESTHETIST, CERTIFIED REGISTERED

## 2025-07-15 PROCEDURE — 81003 URINALYSIS AUTO W/O SCOPE: CPT | Performed by: NEUROLOGICAL SURGERY

## 2025-07-15 PROCEDURE — 85025 COMPLETE CBC W/AUTO DIFF WBC: CPT | Performed by: NEUROLOGICAL SURGERY

## 2025-07-15 PROCEDURE — 82948 REAGENT STRIP/BLOOD GLUCOSE: CPT | Performed by: ANESTHESIOLOGY

## 2025-07-15 PROCEDURE — 25010000002 LIDOCAINE PF 2% 2 % SOLUTION: Performed by: NURSE ANESTHETIST, CERTIFIED REGISTERED

## 2025-07-15 PROCEDURE — 82948 REAGENT STRIP/BLOOD GLUCOSE: CPT

## 2025-07-15 PROCEDURE — 25010000002 FENTANYL CITRATE (PF) 250 MCG/5ML SOLUTION: Performed by: NURSE ANESTHETIST, CERTIFIED REGISTERED

## 2025-07-15 DEVICE — KT HEMOST ABS SURGIFOAM PORCN 1GRAM: Type: IMPLANTABLE DEVICE | Site: SPINE THORACIC | Status: FUNCTIONAL

## 2025-07-15 DEVICE — HEMOST ABS SURGIFOAM SZ100 8X12 10MM: Type: IMPLANTABLE DEVICE | Site: SPINE THORACIC | Status: FUNCTIONAL

## 2025-07-15 RX ORDER — SODIUM CHLORIDE, SODIUM LACTATE, POTASSIUM CHLORIDE, CALCIUM CHLORIDE 600; 310; 30; 20 MG/100ML; MG/100ML; MG/100ML; MG/100ML
1000 INJECTION, SOLUTION INTRAVENOUS CONTINUOUS
Status: DISCONTINUED | OUTPATIENT
Start: 2025-07-15 | End: 2025-07-15 | Stop reason: HOSPADM

## 2025-07-15 RX ORDER — NALOXONE HCL 0.4 MG/ML
0.04 VIAL (ML) INJECTION AS NEEDED
Status: DISCONTINUED | OUTPATIENT
Start: 2025-07-15 | End: 2025-07-15 | Stop reason: HOSPADM

## 2025-07-15 RX ORDER — DEXTROSE MONOHYDRATE 25 G/50ML
12.5 INJECTION, SOLUTION INTRAVENOUS AS NEEDED
Status: DISCONTINUED | OUTPATIENT
Start: 2025-07-15 | End: 2025-07-15 | Stop reason: HOSPADM

## 2025-07-15 RX ORDER — OXYCODONE AND ACETAMINOPHEN 10; 325 MG/1; MG/1
1 TABLET ORAL EVERY 4 HOURS PRN
Status: DISCONTINUED | OUTPATIENT
Start: 2025-07-15 | End: 2025-07-15 | Stop reason: HOSPADM

## 2025-07-15 RX ORDER — DROPERIDOL 2.5 MG/ML
INJECTION, SOLUTION INTRAMUSCULAR; INTRAVENOUS AS NEEDED
Status: DISCONTINUED | OUTPATIENT
Start: 2025-07-15 | End: 2025-07-15 | Stop reason: SURG

## 2025-07-15 RX ORDER — VECURONIUM BROMIDE 1 MG/ML
INJECTION, POWDER, LYOPHILIZED, FOR SOLUTION INTRAVENOUS AS NEEDED
Status: DISCONTINUED | OUTPATIENT
Start: 2025-07-15 | End: 2025-07-15 | Stop reason: SURG

## 2025-07-15 RX ORDER — LABETALOL HYDROCHLORIDE 5 MG/ML
5 INJECTION, SOLUTION INTRAVENOUS
Status: DISCONTINUED | OUTPATIENT
Start: 2025-07-15 | End: 2025-07-15 | Stop reason: HOSPADM

## 2025-07-15 RX ORDER — BUPIVACAINE HYDROCHLORIDE AND EPINEPHRINE 2.5; 5 MG/ML; UG/ML
INJECTION, SOLUTION EPIDURAL; INFILTRATION; INTRACAUDAL; PERINEURAL AS NEEDED
Status: DISCONTINUED | OUTPATIENT
Start: 2025-07-15 | End: 2025-07-15 | Stop reason: HOSPADM

## 2025-07-15 RX ORDER — LIDOCAINE HYDROCHLORIDE 10 MG/ML
0.5 INJECTION, SOLUTION EPIDURAL; INFILTRATION; INTRACAUDAL; PERINEURAL ONCE AS NEEDED
Status: DISCONTINUED | OUTPATIENT
Start: 2025-07-15 | End: 2025-07-15 | Stop reason: HOSPADM

## 2025-07-15 RX ORDER — SODIUM CHLORIDE 0.9 % (FLUSH) 0.9 %
10 SYRINGE (ML) INJECTION AS NEEDED
Status: DISCONTINUED | OUTPATIENT
Start: 2025-07-15 | End: 2025-07-15 | Stop reason: HOSPADM

## 2025-07-15 RX ORDER — MAGNESIUM HYDROXIDE 1200 MG/15ML
LIQUID ORAL AS NEEDED
Status: DISCONTINUED | OUTPATIENT
Start: 2025-07-15 | End: 2025-07-15 | Stop reason: HOSPADM

## 2025-07-15 RX ORDER — AMOXICILLIN 250 MG
2 CAPSULE ORAL DAILY
Qty: 60 TABLET | Refills: 0 | Status: SHIPPED | OUTPATIENT
Start: 2025-07-15

## 2025-07-15 RX ORDER — FENTANYL CITRATE 50 UG/ML
25 INJECTION, SOLUTION INTRAMUSCULAR; INTRAVENOUS
Status: DISCONTINUED | OUTPATIENT
Start: 2025-07-15 | End: 2025-07-15 | Stop reason: HOSPADM

## 2025-07-15 RX ORDER — ONDANSETRON 2 MG/ML
INJECTION INTRAMUSCULAR; INTRAVENOUS AS NEEDED
Status: DISCONTINUED | OUTPATIENT
Start: 2025-07-15 | End: 2025-07-15 | Stop reason: SURG

## 2025-07-15 RX ORDER — HYDROCODONE BITARTRATE AND ACETAMINOPHEN 7.5; 325 MG/1; MG/1
1 TABLET ORAL EVERY 6 HOURS PRN
Qty: 12 TABLET | Refills: 0 | Status: SHIPPED | OUTPATIENT
Start: 2025-07-15 | End: 2025-07-18

## 2025-07-15 RX ORDER — SODIUM CHLORIDE 0.9 % (FLUSH) 0.9 %
10 SYRINGE (ML) INJECTION EVERY 12 HOURS SCHEDULED
Status: DISCONTINUED | OUTPATIENT
Start: 2025-07-15 | End: 2025-07-15 | Stop reason: HOSPADM

## 2025-07-15 RX ORDER — LIDOCAINE HYDROCHLORIDE 20 MG/ML
INJECTION, SOLUTION EPIDURAL; INFILTRATION; INTRACAUDAL; PERINEURAL AS NEEDED
Status: DISCONTINUED | OUTPATIENT
Start: 2025-07-15 | End: 2025-07-15 | Stop reason: SURG

## 2025-07-15 RX ORDER — FENTANYL CITRATE 50 UG/ML
50 INJECTION, SOLUTION INTRAMUSCULAR; INTRAVENOUS
Status: DISCONTINUED | OUTPATIENT
Start: 2025-07-15 | End: 2025-07-15 | Stop reason: HOSPADM

## 2025-07-15 RX ORDER — CHLORHEXIDINE GLUCONATE ORAL RINSE 1.2 MG/ML
15 SOLUTION DENTAL EVERY 12 HOURS SCHEDULED
Status: DISCONTINUED | OUTPATIENT
Start: 2025-07-15 | End: 2025-07-15 | Stop reason: HOSPADM

## 2025-07-15 RX ORDER — IBUPROFEN 600 MG/1
600 TABLET, FILM COATED ORAL EVERY 6 HOURS PRN
Status: DISCONTINUED | OUTPATIENT
Start: 2025-07-15 | End: 2025-07-15 | Stop reason: HOSPADM

## 2025-07-15 RX ORDER — FLUMAZENIL 0.1 MG/ML
0.2 INJECTION INTRAVENOUS AS NEEDED
Status: DISCONTINUED | OUTPATIENT
Start: 2025-07-15 | End: 2025-07-15 | Stop reason: HOSPADM

## 2025-07-15 RX ORDER — ONDANSETRON 2 MG/ML
4 INJECTION INTRAMUSCULAR; INTRAVENOUS
Status: DISCONTINUED | OUTPATIENT
Start: 2025-07-15 | End: 2025-07-15 | Stop reason: HOSPADM

## 2025-07-15 RX ORDER — HYDROMORPHONE HYDROCHLORIDE 1 MG/ML
0.5 INJECTION, SOLUTION INTRAMUSCULAR; INTRAVENOUS; SUBCUTANEOUS
Status: DISCONTINUED | OUTPATIENT
Start: 2025-07-15 | End: 2025-07-15 | Stop reason: HOSPADM

## 2025-07-15 RX ORDER — FENTANYL CITRATE 50 UG/ML
INJECTION, SOLUTION INTRAMUSCULAR; INTRAVENOUS AS NEEDED
Status: DISCONTINUED | OUTPATIENT
Start: 2025-07-15 | End: 2025-07-15 | Stop reason: SURG

## 2025-07-15 RX ORDER — SODIUM CHLORIDE 0.9 % (FLUSH) 0.9 %
3 SYRINGE (ML) INJECTION AS NEEDED
Status: DISCONTINUED | OUTPATIENT
Start: 2025-07-15 | End: 2025-07-15 | Stop reason: HOSPADM

## 2025-07-15 RX ORDER — PROPOFOL 10 MG/ML
VIAL (ML) INTRAVENOUS AS NEEDED
Status: DISCONTINUED | OUTPATIENT
Start: 2025-07-15 | End: 2025-07-15 | Stop reason: SURG

## 2025-07-15 RX ADMIN — ONDANSETRON 4 MG: 2 INJECTION INTRAMUSCULAR; INTRAVENOUS at 11:14

## 2025-07-15 RX ADMIN — PROPOFOL 100 MG: 10 INJECTION, EMULSION INTRAVENOUS at 10:14

## 2025-07-15 RX ADMIN — VECURONIUM BROMIDE 6 MG: 1 INJECTION, POWDER, LYOPHILIZED, FOR SOLUTION INTRAVENOUS at 10:14

## 2025-07-15 RX ADMIN — SODIUM CHLORIDE, POTASSIUM CHLORIDE, SODIUM LACTATE AND CALCIUM CHLORIDE 1000 ML: 600; 310; 30; 20 INJECTION, SOLUTION INTRAVENOUS at 08:55

## 2025-07-15 RX ADMIN — FENTANYL CITRATE 250 MCG: 50 INJECTION, SOLUTION INTRAMUSCULAR; INTRAVENOUS at 10:14

## 2025-07-15 RX ADMIN — OXYCODONE AND ACETAMINOPHEN 1 TABLET: 325; 10 TABLET ORAL at 12:01

## 2025-07-15 RX ADMIN — CEFAZOLIN 2 G: 2 INJECTION, POWDER, FOR SOLUTION INTRAMUSCULAR; INTRAVENOUS at 10:21

## 2025-07-15 RX ADMIN — DROPERIDOL 1.25 MG: 2.5 INJECTION, SOLUTION INTRAMUSCULAR; INTRAVENOUS at 10:14

## 2025-07-15 RX ADMIN — LIDOCAINE HYDROCHLORIDE 100 MG: 20 INJECTION, SOLUTION EPIDURAL; INFILTRATION; INTRACAUDAL; PERINEURAL at 10:14

## 2025-07-15 RX ADMIN — SUGAMMADEX 200 MG: 100 INJECTION, SOLUTION INTRAVENOUS at 11:22

## 2025-07-15 NOTE — ANESTHESIA POSTPROCEDURE EVALUATION
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  Please see RN notes for details of d/c criteria.    Blood pressure 132/81, pulse 83, temperature 98.4 °F (36.9 °C), temperature source Temporal, resp. rate 18, height 177 cm (69.69\"), weight 99.1 kg (218 lb 7.6 oz), SpO2 95%.        "

## 2025-07-15 NOTE — TELEPHONE ENCOUNTER
[address], [address]  [facility] [address]  Phone:[phone]  Fax: [phone]                       Dear DR DAVIDE DIETRICH is scheduled for surgery on 7/29/25 with Dr BRIANNA HUITRON.        Surgery name: T10-L3 instrumentation and LUMBAR CORPECTOMY, right       In order to do this procedure, we are requesting surgical clearance from you. We request documentation stating patient clearance status. This can be an addendum to this encounter, an office note stating patient is cleared or a letter.                      Thank you,           [facility]  Surgery Scheduling Coordinator   [facility]  [address]  [place] [address]  P: [phone]  F: [phone]

## 2025-07-15 NOTE — DISCHARGE INSTRUCTIONS
[facility]    Postoperative care following spine surgery  Dear Patient,  You have recently undergone spine surgery and are now ready to go home. These written instructions are intended to help you to recover quickly.  If you have ANY QUESTIONS about your condition prior to discharge please ask Dr. Marte. In particular, if you have concerns about going home discuss them now. We do not want you to go until you are completely comfortable leaving the hospital.   If you have ANY QUESTIONS about your condition after you go home call your doctor. The number is [phone] which is answered 24 hours a day. During regular working hours a  will connect you to your doctor, one of his partners, or one of our nurses. At night or on weekends the answering service will connect you with the physician on call. DO NOT HESITATE to call. We want to help you with any problems.     Deep vein thrombosis/ pulmonary embolus  Some patients who undergo surgery develop blood clots in the veins of the legs. These clots can cause pain or swelling in the legs, or may cause no obvious problem. They can break free from the legs and travel to the lungs causing shortness of breath and/or chest pain.you develop pain or swelling in your legs after surgery, call your doctor. If you develop breathing problems or chest pain after surgery, call 911.    If you are being discharged to inpatient rehabilitation we will keep you on a drug to thin your blood called heparin. You have been receiving this while you were in the hospital.     Neurological Deficit  Neurological deficits are problems with brain function like speech difficulty, weakness, numbness, imbalance, etc. These deficits may be present before or after spine surgery. Prior to discharge your doctor will make sure that all treatment needed to help you recover from such deficits has been instituted. He will also make sure that these deficits are stable or improving.  After you go home, if you think any of your spine problems are getting worse, not better, it may be a sign of bleeding, infection, or other problems. Call your doctor. He will order tests and prescribe treatment as needed.    Activity Restrictions  In general after surgery you will be on restricted activities for several weeks to several months depending on the nature of your operation. For six weeks you should avoid heavy lifting (over 8 lbs or roughly a gallon of milk), bending, or stooping. You may be released by Dr. Marte earlier. You may return to driving when you feel comfortable enough that you can safely handle your vehicle AND you are not taking narcotic pain medications. This may be as early as 10 - 14 days, but could be longer as instructed by your neurosurgeon. After surgery you may gradually increase your activities, as you are able to tolerate. Walking is an excellent low impact exercise to begin after surgery. You should try to make regular walks of 15 to 30 minutes part of your postoperative recovery as you become able to do so. It typically requires a period of days to a few weeks to reach this level of activity and should be done in a gradual fashion. Your return to work will depend on your job requirements. In general, you may return to light duty work as soon as you feel comfortable and are not taking routine narcotic pain medications.    Medication  It is important to take your medication EXACTLY as prescribed. Some patients are reluctant to take pain medication. It is perfectly fine to take pain medication for several weeks after surgery. We want to eliminate pain whenever possible. Many pain medications can cause nausea (sick to your stomach), constipation (inability to poop), or itching. Nausea may be minimized by taking the medication with food. Constipation can be relieved by taking stool softeners and/ or laxatives that you can purchase over the counter as needed.    It is important to  realize that no pain after surgery is an unrealistic expectation.  Pain medication will never reduce your pain score to zero.  The goal of pain medicine is to reduce your pain to the point you can move, take care of yourself, and participate in therapy.  Make sure to work with your caregiver to determine what is an adequate level of pain control to promote healthy movement and then take your medication to reach this goal.      You have undergone a thoracic bone biopsy surgery which you are expected to recover from over several weeks.     Use of opioids immediately following surgery is often necessary.  Pain after surgery results in decreased quality of life, surgical complications, and prolonged rehabilitation.  Thus in certain situations, the benefits of a limited course of opioids may outweigh the risk.      However, multiple studies have found that patients of all ages frequently take fewer opioid pills than the amount prescribed after common surgeries.  In some cases they do not take any of the prescribed medications at all.  This results in excess opioid pills that are accessible to others, raise a concern for misuse, can be stolen by family members, can result in later addiction, or can often be used in overdose.  Therefore we are going to make every effort to only prescribe as much pain medication as necessary to limit the amount of pills that are left over after you recover.      First-line treatment should include nonopioid analgesics.  Examples of these would be Tylenol or nonsteroidal anti-inflammatories such as ibuprofen or Aleve.  - Tylenol 1000 mg every six hours as needed    Second-line treatment. If the above first-line treatments are insufficient to control your pain you have also been provided a small dose of opioids.  In order to avoid addiction you should take the lowest amount possible.        EXAMPLE IF APPLICABLE:  Please taper your pain medications to avoid long term addiction.  Week 1: Take  your pain medication no more than ever 6 hours as needed for severe pain.  Week 2: Take your pain medication no more than every 8 hours as needed for severe pain.  Week 3: Take your pain medication no more than every 12 hours as needed for unresolved pain.    These recommendations are based on ...  CDC guidelines for control and prevention of postsurgical pain,   [place] opioid prescribing engagement network (OPEN),   Janet pickett in [facility] medical directors group prescribing opioids for postoperative pain-supplemental guidance (2018)    Wound Care  Your incision is held together with dissolvable sutures that do not need to be removed.     Your incision is intact with sutures to be in 2 weeks.    Seventy-two hours after surgery it is OK to get the wound wet, so you can take a shower or bath.     You do not need to put any medication (like Neosporin or Vitamin E) on the wound. Scrubbing the wound should be avoided until the staples nondissolvable sutures come out.     Heating pads have the potential to cause very serious burns, especially in patients using narcotic pain medications (e.g. Oxycodone, Oxycontin, etc.) Do not use heating pads during your recovery.      No nicotine products, including second-hand smoke, gum or patches. Nicotine will delay healing and increase the likelihood of a surgical complication. For help quitting, call the Quitline: 1-601.318.8322     Potential wound problems include the following:  Infection--If the wound becomes red, tender, swollen, or warm it may be infected. Infection is often accompanied by fever. If you think your wound might be infected you should call your doctor. Often you can send us a picture of the wound so we can better evaluate it.   Drainage--Fluid should not drain from your wound. If it does, call your doctor. Colored fluid may indicate infection. Clear fluid may indicate leakage of spinal fluid.   Dehiscence--If the wound does not heal  properly it may open up along the staple line. This is called dehiscence. Call us immediately.   Sutures--Occasionally, one of the buried threads (sutures) may work through the skin. If you think this has happened call your doctor.   Swelling--Spinal fluid or blood may collect under the skin. This is usually harmless, but needs to be evaluated. Call your doctor.     How to contact your doctor  Dr. Marte and his team did your surgery and, therefore, are likely to know more about your condition than any other physicians. We are immediately available to help you with any problems after surgery. Please call us for any concerns at the following numbers:  Doctor’s office:  [phone] (answered 24 hours a day)   [facility] : [phone] (alternative emergency number for on-call neurosurgeon)     Specific instructions related to your surgery  Diet: no restrictions, eat a heart healthy diet. If you are diabetic, tightly controlling your blood sugar over the next 2 weeks is crucial in controlling infection.     Activity: as tolerated, no heavy lifting greater than 8 pounds (1 gallon of milk) or strenuous exercise for at least 2 weeks.    Brace/collar instructions: If you have been provided with the brace while in the hospital, please wear as instructed.    Please do not use NSAIDs (Ibuprofen, Toradol, etc) for 4 weeks following spinal fusion, as this can prevent bone growth. Tylenol is acceptable as an over the counter pain management.     Follow up:   Follow up with Miguel ERAZO on 7/29/2025 for post op wound check and with Dr. Marte in the neurosurgery clinic ([phone]) on 9/17/2025.  We have scheduled these appointment(s) for you.            Sincerely,        Robert Marte MD, PhD, MPH

## 2025-07-15 NOTE — ANESTHESIA PREPROCEDURE EVALUATION
Anesthesia Evaluation     Patient summary reviewed   no history of anesthetic complications:   NPO Solid Status: > 8 hours             Airway   Mallampati: II  Dental    (+) upper dentures    Pulmonary    (+) a smoker, COPD,  (-) asthma, sleep apnea  Cardiovascular   Exercise tolerance: good (4-7 METS)    (+) hypertension, hyperlipidemia  (-) pacemaker, past MI, angina, cardiac stents      Neuro/Psych  (-) seizures, TIA, CVA  GI/Hepatic/Renal/Endo    (+) obesity, diabetes mellitus  (-) GERD, liver disease, no renal disease    Musculoskeletal     Abdominal    Substance History      OB/GYN          Other                    Anesthesia Plan    ASA 2     general     intravenous induction     Anesthetic plan, risks, benefits, and alternatives have been provided, discussed and informed consent has been obtained with: patient.    CODE STATUS:

## 2025-07-15 NOTE — ANESTHESIA PROCEDURE NOTES
Airway  Reason: elective    Date/Time: 7/15/2025 10:15 AM  Airway not difficult    General Information and Staff    Patient location during procedure: OR  CRNA/CAA: Anil Maeyrs CRNA    Indications and Patient Condition  Indications for airway management: airway protection    Preoxygenated: yes  MILS maintained throughout    Mask difficulty assessment: 1 - vent by mask    Final Airway Details    Final airway type: endotracheal airway      Successful airway: ETT  Cuffed: yes   Successful intubation technique: direct laryngoscopy  Endotracheal tube insertion site: oral  Blade: Pepper  Blade size: 2  ETT size (mm): 7.5  Cormack-Lehane Classification: grade I - full view of glottis  Placement verified by: chest auscultation and capnometry   Cuff volume (mL): 5  Measured from: lips  ETT/EBT  to lips (cm): 22  Number of attempts at approach: 1

## 2025-07-16 ENCOUNTER — RESULTS FOLLOW-UP (OUTPATIENT)
Dept: NEUROSURGERY | Facility: CLINIC | Age: 70
End: 2025-07-16
Payer: MEDICARE

## 2025-07-16 LAB — MRSA SPEC QL CULT: NORMAL

## 2025-07-17 LAB
CYTO UR: NORMAL
LAB AP CASE REPORT: NORMAL
LAB AP DIAGNOSIS COMMENT: NORMAL
Lab: NORMAL
Lab: NORMAL
PATH REPORT.FINAL DX SPEC: NORMAL
PATH REPORT.GROSS SPEC: NORMAL

## 2025-07-18 DIAGNOSIS — E55.9 VITAMIN D DEFICIENCY: Primary | ICD-10-CM

## 2025-07-21 ENCOUNTER — TELEPHONE (OUTPATIENT)
Dept: NEUROSURGERY | Facility: CLINIC | Age: 70
End: 2025-07-21
Payer: MEDICARE

## 2025-07-21 ENCOUNTER — OFFICE VISIT (OUTPATIENT)
Dept: PULMONOLOGY | Facility: CLINIC | Age: 70
End: 2025-07-21
Payer: MEDICARE

## 2025-07-21 DIAGNOSIS — Z72.0 TOBACCO USE: ICD-10-CM

## 2025-07-21 PROCEDURE — 94375 RESPIRATORY FLOW VOLUME LOOP: CPT | Performed by: INTERNAL MEDICINE

## 2025-07-21 PROCEDURE — 94726 PLETHYSMOGRAPHY LUNG VOLUMES: CPT | Performed by: INTERNAL MEDICINE

## 2025-07-21 PROCEDURE — 94729 DIFFUSING CAPACITY: CPT | Performed by: INTERNAL MEDICINE

## 2025-07-21 RX ORDER — ERGOCALCIFEROL 1.25 MG/1
50000 CAPSULE, LIQUID FILLED ORAL WEEKLY
Qty: 4 CAPSULE | Refills: 2 | Status: SHIPPED | OUTPATIENT
Start: 2025-07-21 | End: 2025-10-13

## 2025-07-21 NOTE — TELEPHONE ENCOUNTER
RECEIVED CALL FROM PATIENTS DAUGHTER DALILA WANTING TO CONFIRM SURGERY FOR 7/29 AND DISCUSS ARRIVAL TIME AND PREP AND MEDICATIONS.     ADVISED HER THAT RIGHT NOW ARRIVAL TIME IS 9AM AND I WOULD CALL AND CONFIRM THAT THE DAY BEFORE SURGERY. WE REVIEWED MEDICATIONS AND PT TAKES OZEMPIC. ADVISED THAT HE TOOK THAT YESTERDAY 7/20 AND DALILA AND I DISCUSSED THAT HE WILL NOT TAKE IT AGAIN UNTIL AFTER SURGERY AS HE WILL HAVE TO BE OFF OF IT FOR A WEEK PRIOR PER ANESTHESIA GUIDELINES.       ENDED CONVERSATION WITH DALILA VOICING UNDERSTANDING OF INSTRUCTIONS AND ARRIVAL INFORMATION FOR 7/29.

## 2025-07-21 NOTE — PROCEDURES
Spirometry with Diffusion Capacity & Lung Volumes    Performed by: Itzel Haq, RRT  Authorized by: Thiago Saul DO     Pre Drug % Predicted    FVC: 109%   FEV1: 106%   FEF 25-75%: 102%   FEV1/FVC: 74%   T%   RV: 76%   DLCO: 126%   D/VAsb: 109%    Interpretation   Spirometry   Spirometry shows normal results. midflow is normal.  Review of FVL curve   Patient's effort is normal.   Lung Volume Measurements  Measurements show normal results.   Diffusion Capacity  The patient's diffusion capacity is normal.

## 2025-07-21 NOTE — OP NOTE
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 male with a significant comorbidity of known lung mass, hypertension, type 2 diabetes, class I obesity, pulmonary emphysema. He presents with a new problem of 7 out of 10 back pain. Physical exam findings of bilateral Babinski and hyperreflexia in his lower extremities with increased tone in his lower extremities as well as marked cervical kyphosis.  His imaging shows lytic lesions to L1 and T11 with L1 suggesting canal compromise and violation of the posterior wall of the spinal column on noncontrast CT.     The risks and benefits of the procedure were discussed. The patient accepted and understood all potential risks and complications including infection, CSF leak, hematoma, damage to nerve roots or spinal cord, bowel or bladder incontinence, difficulty walking or weakness.  After considering options, the patient requested that we proceed with the procedure.    DESCRIPTION OF OPERATION:   On the day of surgery, the patient was brought to the preoperative holding area where IV access was obtained. Prophylactic intravenous antibiotics were administered. The patient was then brought to the major operative suite.     While on the [facility], he underwent an uneventful induction of general anesthetic with placement of endotracheal tube.  TEDs and SCD hoses were applied.  The patient was then placed in prone position on a Sin table.  All pressure points were inspected and appropriately padded, and he was secured to the table.  His back was sterilely prepped with alcohol.  ChloraPrep was then applied and allowed to dry for 3 minutes, and the patient was draped in the usual fashion.  At this point a WHO surgical timeout was performed with all members of the surgical team.      With the patient's relevant imaging dominantly displayed, anatomical landmarks were used to approximate the length of the incision.  The skin was infused with quarter percent Marcaine with epinephrine.  A #10 blade was used to incise the skin.  Monopolar cautery was used on the skin and subcutaneous tissues until the fascia was identified. The fascia was incised with a single incision and the lumbar spinous processes were exposed in the subperosteal plane.      At this point a Kocher was placed on the most inferior exposed spinous process (T11). Fluoroscopy was brought into the field and a lateral image was obtained to ensure correct level by counting cranially from S1.     Once confirmed the exposure was widened to include T10 through T12 and retractors were adjusted.  A large Leksell was used to remove the spinous processes at T11.  We were initially only going to perform a spinous process biopsy but the lamina was found to be completely invested with tumor.  Therefore we decided to perform a full laminectomy and right medial facetectomy.  We did not use the high speed drill to prevent spreading tumor.  Two and three millimeter Kerrisons were used to complete the laminectomy and widen the exposure.  The 3 mm and 4 mm Kerrisons were then used to remove the ligament of flavum centrally.  Once complete, ligament was removed from the lateral gutters.      The wound was then copiously irrigated.  Meticulous hemostasis was again achieved.  A thrombin soaked Gelfoam with  preservative-free morphine was placed over the exposed dura.  Retractors were then removed.  The 0 Vicryl sutures were used to close the fascia in a watertight manner.  The subcutaneous tissues were closed with 2-0 Vicryl.  The skin was closed with Monocryl.  The patient was awoken and found to be at his neurological baseline. He is was successfully extubated and transferred to the postoperative care unit in stable condition.      Estimated Blood Loss: 100ml    Complications: None.  All counts were corrected at the end of the case.     Implants:   Implant Name Type Inv. Item Serial No.  Lot No. LRB No. Used Action   KT HEMOST ABS SURGIFOAM PORCN 1GRAM - [id number] Implant KT HEMOST ABS SURGIFOAM PORCN 1GRAM  [employer]  [employer] [id number] N/A 1 Implanted   HEMOST ABS SURGIFOAM  8X12 10MM - [id number] Implant HEMOST ABS SURGIFOAM  8X12 10MM  [employer]  [employer] [id number] N/A 1 Implanted       Specimens:                Specimens       ID Source Type Tests Collected By Collected At Frozen?    A Spine, Thoracic Bone SURGICAL PATHOLOGY EXAM   Endy Marte MD 7/15/25 1100 Yes    Description: T11 for frozen    B Spine, Thoracic Bone SURGICAL PATHOLOGY EXAM   Endy Marte MD 7/15/25 1112 No    Description: T11, do not decalcify              Drains: * No LDAs found *

## 2025-07-23 LAB
QT INTERVAL: 362 MS
QTC INTERVAL: 430 MS

## 2025-07-24 ENCOUNTER — TELEPHONE (OUTPATIENT)
Dept: INTERNAL MEDICINE | Facility: CLINIC | Age: 70
End: 2025-07-24
Payer: MEDICARE

## 2025-07-24 ENCOUNTER — OFFICE VISIT (OUTPATIENT)
Dept: PULMONOLOGY | Facility: CLINIC | Age: 70
End: 2025-07-24
Payer: MEDICARE

## 2025-07-24 VITALS
WEIGHT: 216 LBS | HEIGHT: 70 IN | SYSTOLIC BLOOD PRESSURE: 136 MMHG | BODY MASS INDEX: 30.92 KG/M2 | RESPIRATION RATE: 18 BRPM | DIASTOLIC BLOOD PRESSURE: 80 MMHG | HEART RATE: 96 BPM | OXYGEN SATURATION: 98 %

## 2025-07-24 DIAGNOSIS — I10 PRIMARY HYPERTENSION: ICD-10-CM

## 2025-07-24 DIAGNOSIS — C79.51 MALIGNANT NEOPLASM METASTATIC TO LUMBAR SPINE WITH UNKNOWN PRIMARY SITE: ICD-10-CM

## 2025-07-24 DIAGNOSIS — R91.8 MASS OF UPPER LOBE OF RIGHT LUNG: ICD-10-CM

## 2025-07-24 DIAGNOSIS — C80.1 MALIGNANT NEOPLASM METASTATIC TO LUMBAR SPINE WITH UNKNOWN PRIMARY SITE: ICD-10-CM

## 2025-07-24 DIAGNOSIS — R06.09 CHRONIC DYSPNEA: ICD-10-CM

## 2025-07-24 DIAGNOSIS — E11.65 TYPE 2 DIABETES MELLITUS WITH HYPERGLYCEMIA, WITHOUT LONG-TERM CURRENT USE OF INSULIN: ICD-10-CM

## 2025-07-24 DIAGNOSIS — J43.9 PULMONARY EMPHYSEMA, UNSPECIFIED EMPHYSEMA TYPE: ICD-10-CM

## 2025-07-24 DIAGNOSIS — Z72.0 TOBACCO USE: Primary | ICD-10-CM

## 2025-07-24 RX ORDER — LOSARTAN POTASSIUM 25 MG/1
25 TABLET ORAL DAILY
Qty: 90 TABLET | Refills: 2 | Status: SHIPPED | OUTPATIENT
Start: 2025-07-24

## 2025-07-24 RX ORDER — METFORMIN HYDROCHLORIDE 500 MG/1
500 TABLET, EXTENDED RELEASE ORAL 2 TIMES DAILY
Qty: 180 TABLET | Refills: 2 | Status: SHIPPED | OUTPATIENT
Start: 2025-07-24

## 2025-07-24 NOTE — PROGRESS NOTES
Clinic Note - Follow Up            NAME: Porfirio Gates  MRN: [id number]  AGE: 69 y.o.            : 1955  PRIMARY CARE PROVIDER: Jenaro Graff MD               Reason for Visit:  Porfirio Gates presents to clinic today for follow up for the evaluation of right upper lobe mass.    History of Present Illness:  Mr. Gates is a 69-year-old male who presents to clinic today for follow-up. Past medical history includes newly diagnosed metastatic adenocarcinoma, tobacco use, chronic dyspnea.     The patient was last seen in clinic as a new patient on 2025.  He was referred for evaluation of biopsy of a right upper lobe mass.  At that visit we discussed the abnormal finding on the spine.  He has since seen Dr. Marte and has undergone biopsy of the spinal lesion.  Biopsy of the lesion was positive for adenocarcinoma favoring GI primary.  He has surgery planned on the  with Dr. Marte for instrumentation and lumbar corpectomy.    Review of Systems:  A full 12-point review of systems was performed and was negative except as documented in the HPI.            Physical Exam:  General: No acute distress, cooperative.  Head: Atraumatic, normocephalic, normal inspection.  Eyes: EOMI, normal inspection.  ENT: Mucous membranes moist, normal inspection. No thrush.  Heart: RRR, no murmur  Lungs: Clear to auscultation  MSK: No edema or clubbing  GI/abdominal: Soft, nontender.  Neurologic: Alert, oriented.  Cranial nerves II through XII grossly intact.      Imaging Review:  I personally reviewed the chest x-ray done 7/15/2025:  Similar right upper lobe mass.      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  I did review the path results with the patient and his daughter.  I explained the path was positive for adenocarcinoma favoring GI source.  However his CT of his abdomen and pelvis did not note any masses.  I explained it is possible the source of the metastasis is from the right upper lobe mass.  I did explain since we now have a diagnosis and stage, currently I do not feel there is a need to biopsy the right upper lobe mass.  Will place a referral to oncology.      Plan:   - Continue to follow-up with neurosurgery for planned intervention  - No plans to biopsy the right upper lobe mass, if felt required by oncology can reevaluate  - Oncology referral  - Discuss follow-up with the patient and his daughter, will plan for follow-up with pulmonology as needed           Follow Up:  As needed         Thank you Jenaro Graff MD for allowing me to participate in this patient's care.           Past Medical History:   Past Medical History:   Diagnosis Date    Diabetes type 2     Hypertension     Kidney stones          Past Surgical History:   Past Surgical History:   Procedure Laterality Date    ADENOIDECTOMY      CARDIAC CATHETERIZATION N/A 9/6/2023    Procedure: Left Heart Cath;  Surgeon: Melvin Peterson MD;  Location:  [facility]ATH [facility];  Service: Cardiology;  Laterality: N/A;    CHOLECYSTECTOMY      CYSTOSCOPY URETEROSCOPY LASER LITHOTRIPSY Left 3/10/2017    Procedure: CYSTOSCOPY URETEROSCOPY LASER LITHOTRIPSY;  Surgeon: Neftali Pereira MD;  Location:  [facility] OR;  Service:     CYSTOSCOPY W/ URETERAL STENT PLACEMENT Left 3/10/2017    Procedure: CYSTOSCOPY URETERAL CATHETER/STENT INSERTION LEFT URETER;  Surgeon: Neftali Pereira MD;  Location:  [facility] OR;  Service:     LUMBAR BONE BIOPSY N/A 7/15/2025    Procedure: OPEN T11 BONE BIOPSY;  Surgeon: Rosanna  "Endy Jones MD;  Location: [facility] OR;  Service: Neurosurgery;  Laterality: N/A;         Family History:   Family History   Problem Relation Age of Onset    No Known Problems Mother     Diabetes Father     Heart attack Father     Colon cancer Father     Cancer Brother          Medications:   Prior to Admission medications    Medication Sig Start Date End Date Taking? Authorizing Provider   aspirin 81 MG EC tablet Take 1 tablet by mouth Daily.   Yes ProviderKaylyn MD   atorvastatin (LIPITOR) 40 MG tablet Take 1 tablet by mouth Every Night. 2/13/25  Yes Jenaro Graff MD   clotrimazole-betamethasone (LOTRISONE) 1-0.05 % cream Apply 1 Application topically to the appropriate area as directed 2 (Two) Times a Day. 6/4/25  Yes Jenaro Graff MD   ibuprofen (ADVIL,MOTRIN) 200 MG tablet Take 1 tablet by mouth Every 6 (Six) Hours As Needed for Mild Pain.   Yes ProviderKaylyn MD   losartan (COZAAR) 25 MG tablet TAKE 1 TABLET BY MOUTH EVERY DAY 1/2/25  Yes Jenaro Graff MD   Semaglutide,0.25 or 0.5MG/DOS, (Ozempic, 0.25 or 0.5 MG/DOSE,) 2 MG/3ML solution pen-injector Inject 0.25 mg under the skin into the appropriate area as directed 1 (One) Time Per Week for 28 days, THEN 0.5 mg 1 (One) Time Per Week for 28 days. 6/4/25 7/30/25 Yes Jenaro Graff MD   sennosides-docusate (senna-docusate sodium) 8.6-50 MG per tablet Take 2 tablets by mouth Daily. 7/15/25  Yes Miguel Can APRN   vitamin D (ERGOCALCIFEROL) 1.25 MG (42513 UT) capsule capsule Take 1 capsule by mouth 1 (One) Time Per Week for 84 days. 7/21/25 10/13/25 Yes Endy Marte MD   metFORMIN ER (GLUCOPHAGE-XR) 500 MG 24 hr tablet TAKE 1 TABLET BY MOUTH EVERY DAY WITH BREAKFAST  Patient not taking: Reported on 7/15/2025 1/2/25   Jenaro Graff MD         Allergies:   Patient has no known allergies.      Results Review:             Visit Vitals  /80   Pulse 96   Resp 18   Ht 177 cm (69.69\")   Wt 98 kg (216 lb)   SpO2 " 98% Comment: r/a   BMI 31.27 kg/m²           Social History:     Social History     Socioeconomic History    Marital status: Single   Tobacco Use    Smoking status: Every Day     Current packs/day: 0.25     Types: Cigarettes     Passive exposure: Current    Smokeless tobacco: Former    Tobacco comments:     Report smokes half a pack on a daily -reported 6/25/25.    Vaping Use    Vaping status: Never Used   Substance and Sexual Activity    Alcohol use: No    Drug use: No    Sexual activity: Defer                Thiago Saul DO  Pulmonary/Critical Care  7/24/2025  10:44 CDT

## 2025-07-24 NOTE — TELEPHONE ENCOUNTER
Caller: héctor little    Relationship: Emergency Contact    Best call back number: [phone]    What is the best time to reach you: SOON PLEASE     Who are you requesting to speak with (clinical staff, provider,  specific staff member): CLINICAL STAFF         What was the call regarding: PATIENTS DAUGHTER REQUESTING A CALL BACK TO GO OVER SOME QUESTIONS SHE HAS ABOUT PATIENTS MEDICATIONS THAT HE SUPPOSED TO HOLD BEFORE SURGERY NEXT WEEK AND POSSIBLY JUST GOING BACK METFORMIN AFTERWARDS ALSO     Is it okay if the provider responds through MyChart: PREFERS A CALL BACK

## 2025-07-24 NOTE — TELEPHONE ENCOUNTER
Talked with dgt - he is scheduled for a biopsy on Tuesday and they have instructed him to stop Ozempic pre-op.  She states she is not sure that he has been compliant with this nor with his other medications, but insurance coverage is not great for Ozempic, so she is asking if he can just leave this off post-op and be given something else in place of it.  Asked if he is checking his sugar and she says no.  She is not sure if he has been taking his Metformin or BP med either.  According to last Rx renewal and pharmacy disp hx, he has not.    Discussed with Dr. Graff and he says can leave off Ozempic, but in doing so, will need to increase Metformin to bid and will FU with A1C at Sept FU.  She understands.  Will send in new Rx's for Metformin and Losartan at her request.

## 2025-07-27 DIAGNOSIS — E11.65 TYPE 2 DIABETES MELLITUS WITH HYPERGLYCEMIA, WITHOUT LONG-TERM CURRENT USE OF INSULIN: ICD-10-CM

## 2025-07-28 ENCOUNTER — TELEPHONE (OUTPATIENT)
Dept: NEUROSURGERY | Facility: CLINIC | Age: 70
End: 2025-07-28
Payer: MEDICARE

## 2025-07-28 RX ORDER — SEMAGLUTIDE 0.68 MG/ML
INJECTION, SOLUTION SUBCUTANEOUS
OUTPATIENT
Start: 2025-07-28 | End: 2025-09-22

## 2025-07-29 ENCOUNTER — APPOINTMENT (OUTPATIENT)
Dept: GENERAL RADIOLOGY | Facility: HOSPITAL | Age: 70
End: 2025-07-29
Payer: MEDICARE

## 2025-07-29 ENCOUNTER — ANESTHESIA EVENT (OUTPATIENT)
Dept: PERIOP | Facility: HOSPITAL | Age: 70
End: 2025-07-29
Payer: MEDICARE

## 2025-07-29 ENCOUNTER — HOSPITAL ENCOUNTER (INPATIENT)
Facility: HOSPITAL | Age: 70
LOS: 4 days | Discharge: HOME | End: 2025-08-02
Attending: NEUROLOGICAL SURGERY | Admitting: NEUROLOGICAL SURGERY
Payer: MEDICARE

## 2025-07-29 ENCOUNTER — ANESTHESIA (OUTPATIENT)
Dept: PERIOP | Facility: HOSPITAL | Age: 70
End: 2025-07-29
Payer: MEDICARE

## 2025-07-29 DIAGNOSIS — E11.65 TYPE 2 DIABETES MELLITUS WITH HYPERGLYCEMIA, WITHOUT LONG-TERM CURRENT USE OF INSULIN: ICD-10-CM

## 2025-07-29 DIAGNOSIS — C79.51 METASTASIS TO SPINAL COLUMN: ICD-10-CM

## 2025-07-29 DIAGNOSIS — C79.51 MALIGNANT NEOPLASM METASTATIC TO LUMBAR SPINE WITH UNKNOWN PRIMARY SITE: ICD-10-CM

## 2025-07-29 DIAGNOSIS — C80.1 MALIGNANT NEOPLASM METASTATIC TO LUMBAR SPINE WITH UNKNOWN PRIMARY SITE: ICD-10-CM

## 2025-07-29 DIAGNOSIS — Z74.09 IMPAIRED MOBILITY: Primary | ICD-10-CM

## 2025-07-29 DIAGNOSIS — R91.8 MASS OF UPPER LOBE OF RIGHT LUNG: ICD-10-CM

## 2025-07-29 DIAGNOSIS — R06.09 CHRONIC DYSPNEA: ICD-10-CM

## 2025-07-29 DIAGNOSIS — C79.51 METASTATIC CANCER TO SPINE: ICD-10-CM

## 2025-07-29 LAB
A-A DO2: ABNORMAL
ABO GROUP BLD: NORMAL
ARTERIAL PATENCY WRIST A: ABNORMAL
ARTERIAL PATENCY WRIST A: ABNORMAL
ATMOSPHERIC PRESS: 753 MMHG
ATMOSPHERIC PRESS: 754 MMHG
BASE EXCESS BLDA CALC-SCNC: 2.9 MMOL/L (ref 0–2)
BASE EXCESS BLDA CALC-SCNC: 3.1 MMOL/L (ref 0–2)
BDY SITE: ABNORMAL
BDY SITE: ABNORMAL
BLD GP AB SCN SERPL QL: NEGATIVE
BODY TEMPERATURE: 37
BODY TEMPERATURE: 37.1
CA-I BLD-MCNC: 4.52 MG/DL (ref 4.6–5.4)
CA-I BLD-MCNC: 4.54 MG/DL (ref 4.6–5.4)
COHGB MFR BLD: 1 % (ref 0–5)
COHGB MFR BLD: 1.1 % (ref 0–5)
CPAP: ABNORMAL
EPAP: ABNORMAL
GAS FLOW AIRWAY: ABNORMAL L/MIN
GLUCOSE BLDA-MCNC: 130 MG/DL (ref 65–99)
GLUCOSE BLDC GLUCOMTR-MCNC: 134 MG/DL (ref 70–130)
GLUCOSE BLDC GLUCOMTR-MCNC: 163 MG/DL (ref 70–130)
GLUCOSE BLDC GLUCOMTR-MCNC: 178 MG/DL (ref 70–130)
HCO3 BLDA-SCNC: 26.2 MMOL/L (ref 20–26)
HCO3 BLDA-SCNC: 26.3 MMOL/L (ref 20–26)
HCT VFR BLD CALC: 30.2 % (ref 38–51)
HCT VFR BLD CALC: 31.2 % (ref 38–51)
HGB BLDA-MCNC: 10.2 G/DL (ref 14–18)
HGB BLDA-MCNC: 9.8 G/DL (ref 14–18)
INHALED O2 CONCENTRATION: 100 %
INHALED O2 CONCENTRATION: 100 %
IPAP: ABNORMAL
LACTATE BLDA-SCNC: 1 MMOL/L (ref 0.5–2)
Lab: ABNORMAL
METHGB BLD QL: 0.9 % (ref 0–3)
METHGB BLD QL: 1.1 % (ref 0–3)
MODALITY: ABNORMAL
MODALITY: ABNORMAL
NITRIC OXIDE: ABNORMAL
NOTE: ABNORMAL
NOTIFIED BY: ABNORMAL
NOTIFIED WHO: ABNORMAL
OXYHGB MFR BLDV: 98 % (ref 94–99)
OXYHGB MFR BLDV: 98.2 % (ref 94–99)
PAW @ PEAK INSP FLOW SETTING VENT: ABNORMAL CM[H2O]
PCO2 BLDA: 33.6 MM HG (ref 35–45)
PCO2 BLDA: 35.2 MM HG (ref 35–45)
PCO2 TEMP ADJ BLD: 33.8 MM HG (ref 35–45)
PCO2 TEMP ADJ BLD: 35.2 MM HG (ref 35–45)
PEEP RESPIRATORY: ABNORMAL CM[H2O]
PH BLDA: 7.48 PH UNITS (ref 7.35–7.45)
PH BLDA: 7.5 PH UNITS (ref 7.35–7.45)
PH, TEMP CORRECTED: 7.48 PH UNITS (ref 7.35–7.45)
PH, TEMP CORRECTED: 7.5 PH UNITS (ref 7.35–7.45)
PO2 BLDA: 240 MM HG (ref 83–108)
PO2 BLDA: ABNORMAL MM[HG]
PO2 TEMP ADJ BLD: 240 MM HG (ref 83–108)
PO2 TEMP ADJ BLD: 425 MM HG (ref 83–108)
POTASSIUM BLDA-SCNC: 3.7 MMOL/L (ref 3.5–5.2)
POTASSIUM BLDA-SCNC: 4 MMOL/L (ref 3.5–5.2)
PSV: ABNORMAL
PULSE OX: ABNORMAL
RH BLD: POSITIVE
SAO2 % BLDCOA: 100 % (ref 94–99)
SAO2 % BLDCOA: >100.1 % (ref 94–99)
SET MECH RESP RATE: ABNORMAL
SODIUM BLDA-SCNC: 140 MMOL/L (ref 136–145)
SODIUM BLDA-SCNC: 141 MMOL/L (ref 136–145)
T&S EXPIRATION DATE: NORMAL
TOTAL RATE: ABNORMAL
VENTILATOR MODE: ABNORMAL
VENTILATOR MODE: ABNORMAL
VT ON VENT VENT: ABNORMAL ML

## 2025-07-29 PROCEDURE — 0RG7071 FUSION OF 2 TO 7 THORACIC VERTEBRAL JOINTS WITH AUTOLOGOUS TISSUE SUBSTITUTE, POSTERIOR APPROACH, POSTERIOR COLUMN, OPEN APPROACH: ICD-10-PCS | Performed by: NEUROLOGICAL SURGERY

## 2025-07-29 PROCEDURE — 85018 HEMOGLOBIN: CPT

## 2025-07-29 PROCEDURE — 82330 ASSAY OF CALCIUM: CPT

## 2025-07-29 PROCEDURE — C1713 ANCHOR/SCREW BN/BN,TIS/BN: HCPCS | Performed by: NEUROLOGICAL SURGERY

## 2025-07-29 PROCEDURE — 22614 ARTHRD PST TQ 1NTRSPC EA ADD: CPT | Performed by: NEUROLOGICAL SURGERY

## 2025-07-29 PROCEDURE — 8E0WXBG COMPUTER ASSISTED PROCEDURE OF TRUNK REGION, WITH COMPUTERIZED TOMOGRAPHY: ICD-10-PCS | Performed by: NEUROLOGICAL SURGERY

## 2025-07-29 PROCEDURE — 22842 INSERT SPINE FIXATION DEVICE: CPT | Performed by: NEUROLOGICAL SURGERY

## 2025-07-29 PROCEDURE — 25010000002 METHYLPREDNISOLONE PER 40 MG: Performed by: NEUROLOGICAL SURGERY

## 2025-07-29 PROCEDURE — 61783 SCAN PROC SPINAL: CPT | Performed by: NEUROLOGICAL SURGERY

## 2025-07-29 PROCEDURE — 63710000001 INSULIN GLARGINE PER 5 UNITS: Performed by: NURSE PRACTITIONER

## 2025-07-29 PROCEDURE — 25010000002 PROPOFOL 10 MG/ML EMULSION: Performed by: NURSE ANESTHETIST, CERTIFIED REGISTERED

## 2025-07-29 PROCEDURE — 25010000002 FENTANYL CITRATE (PF) 50 MCG/ML SOLUTION: Performed by: NURSE ANESTHETIST, CERTIFIED REGISTERED

## 2025-07-29 PROCEDURE — 84132 ASSAY OF SERUM POTASSIUM: CPT

## 2025-07-29 PROCEDURE — 82805 BLOOD GASES W/O2 SATURATION: CPT

## 2025-07-29 PROCEDURE — 83605 ASSAY OF LACTIC ACID: CPT

## 2025-07-29 PROCEDURE — 88307 TISSUE EXAM BY PATHOLOGIST: CPT | Performed by: NEUROLOGICAL SURGERY

## 2025-07-29 PROCEDURE — 63047 LAM FACETEC & FORAMOT LUMBAR: CPT | Performed by: NEUROLOGICAL SURGERY

## 2025-07-29 PROCEDURE — 86900 BLOOD TYPING SEROLOGIC ABO: CPT | Performed by: NEUROLOGICAL SURGERY

## 2025-07-29 PROCEDURE — 25010000002 MORPHINE PER 10 MG: Performed by: NEUROLOGICAL SURGERY

## 2025-07-29 PROCEDURE — 22532 ARTHRD LAT XTRCVTRY TQ THRC: CPT | Performed by: NEUROLOGICAL SURGERY

## 2025-07-29 PROCEDURE — 76000 FLUOROSCOPY <1 HR PHYS/QHP: CPT

## 2025-07-29 PROCEDURE — P9041 ALBUMIN (HUMAN),5%, 50ML: HCPCS | Performed by: NURSE ANESTHETIST, CERTIFIED REGISTERED

## 2025-07-29 PROCEDURE — 84295 ASSAY OF SERUM SODIUM: CPT

## 2025-07-29 PROCEDURE — 25810000003 LACTATED RINGERS PER 1000 ML: Performed by: NEUROLOGICAL SURGERY

## 2025-07-29 PROCEDURE — 22534 ARTHRD LAT XTRCVTRY TQ EA AD: CPT | Performed by: NEUROLOGICAL SURGERY

## 2025-07-29 PROCEDURE — 25010000002 FENTANYL CITRATE (PF) 100 MCG/2ML SOLUTION: Performed by: NURSE ANESTHETIST, CERTIFIED REGISTERED

## 2025-07-29 PROCEDURE — 25010000002 VANCOMYCIN 1 G RECONSTITUTED SOLUTION: Performed by: NEUROLOGICAL SURGERY

## 2025-07-29 PROCEDURE — 25010000002 CEFAZOLIN PER 500 MG: Performed by: NURSE PRACTITIONER

## 2025-07-29 PROCEDURE — 82948 REAGENT STRIP/BLOOD GLUCOSE: CPT

## 2025-07-29 PROCEDURE — 22854 INSJ BIOMECHANICAL DEVICE: CPT | Performed by: NEUROLOGICAL SURGERY

## 2025-07-29 PROCEDURE — 25010000002 CEFAZOLIN PER 500 MG: Performed by: NEUROLOGICAL SURGERY

## 2025-07-29 PROCEDURE — 86850 RBC ANTIBODY SCREEN: CPT | Performed by: NEUROLOGICAL SURGERY

## 2025-07-29 PROCEDURE — 88311 DECALCIFY TISSUE: CPT | Performed by: NEUROLOGICAL SURGERY

## 2025-07-29 PROCEDURE — 0RGA071 FUSION OF THORACOLUMBAR VERTEBRAL JOINT WITH AUTOLOGOUS TISSUE SUBSTITUTE, POSTERIOR APPROACH, POSTERIOR COLUMN, OPEN APPROACH: ICD-10-PCS | Performed by: NEUROLOGICAL SURGERY

## 2025-07-29 PROCEDURE — 25010000002 ALBUMIN HUMAN 5% PER 50 ML: Performed by: NURSE ANESTHETIST, CERTIFIED REGISTERED

## 2025-07-29 PROCEDURE — 22610 ARTHRD PST TQ 1NTRSPC THRC: CPT | Performed by: NEUROLOGICAL SURGERY

## 2025-07-29 PROCEDURE — 83050 HGB METHEMOGLOBIN QUAN: CPT

## 2025-07-29 PROCEDURE — 22845 INSERT SPINE FIXATION DEVICE: CPT | Performed by: NEUROLOGICAL SURGERY

## 2025-07-29 PROCEDURE — 86923 COMPATIBILITY TEST ELECTRIC: CPT

## 2025-07-29 PROCEDURE — 63102 REMOVE VERT BODY DCMPRN LMBR: CPT | Performed by: NEUROLOGICAL SURGERY

## 2025-07-29 PROCEDURE — 0SB20ZZ EXCISION OF LUMBAR VERTEBRAL DISC, OPEN APPROACH: ICD-10-PCS | Performed by: NEUROLOGICAL SURGERY

## 2025-07-29 PROCEDURE — 82375 ASSAY CARBOXYHB QUANT: CPT

## 2025-07-29 PROCEDURE — 0RBB0ZZ EXCISION OF THORACOLUMBAR VERTEBRAL DISC, OPEN APPROACH: ICD-10-PCS | Performed by: NEUROLOGICAL SURGERY

## 2025-07-29 PROCEDURE — 0SG00AJ FUSION OF LUMBAR VERTEBRAL JOINT WITH INTERBODY FUSION DEVICE, POSTERIOR APPROACH, ANTERIOR COLUMN, OPEN APPROACH: ICD-10-PCS | Performed by: NEUROLOGICAL SURGERY

## 2025-07-29 PROCEDURE — 25010000002 LIDOCAINE PF 2% 2 % SOLUTION: Performed by: NURSE ANESTHETIST, CERTIFIED REGISTERED

## 2025-07-29 PROCEDURE — 25010000002 MIDAZOLAM PER 1MG: Performed by: ANESTHESIOLOGY

## 2025-07-29 PROCEDURE — 86901 BLOOD TYPING SEROLOGIC RH(D): CPT | Performed by: NEUROLOGICAL SURGERY

## 2025-07-29 PROCEDURE — 82803 BLOOD GASES ANY COMBINATION: CPT

## 2025-07-29 PROCEDURE — 4A11X4G MONITORING OF PERIPHERAL NERVOUS ELECTRICAL ACTIVITY, INTRAOPERATIVE, EXTERNAL APPROACH: ICD-10-PCS | Performed by: NEUROLOGICAL SURGERY

## 2025-07-29 PROCEDURE — 0SG1071 FUSION OF 2 OR MORE LUMBAR VERTEBRAL JOINTS WITH AUTOLOGOUS TISSUE SUBSTITUTE, POSTERIOR APPROACH, POSTERIOR COLUMN, OPEN APPROACH: ICD-10-PCS | Performed by: NEUROLOGICAL SURGERY

## 2025-07-29 PROCEDURE — 88341 IMHCHEM/IMCYTCHM EA ADD ANTB: CPT | Performed by: NEUROLOGICAL SURGERY

## 2025-07-29 PROCEDURE — 82948 REAGENT STRIP/BLOOD GLUCOSE: CPT | Performed by: ANESTHESIOLOGY

## 2025-07-29 PROCEDURE — 25010000002 CEFAZOLIN PER 500 MG: Performed by: NURSE ANESTHETIST, CERTIFIED REGISTERED

## 2025-07-29 PROCEDURE — 88342 IMHCHEM/IMCYTCHM 1ST ANTB: CPT | Performed by: NEUROLOGICAL SURGERY

## 2025-07-29 PROCEDURE — 82947 ASSAY GLUCOSE BLOOD QUANT: CPT

## 2025-07-29 RX ORDER — FENTANYL CITRATE 50 UG/ML
50 INJECTION, SOLUTION INTRAMUSCULAR; INTRAVENOUS
Status: DISCONTINUED | OUTPATIENT
Start: 2025-07-29 | End: 2025-07-29 | Stop reason: HOSPADM

## 2025-07-29 RX ORDER — SODIUM CHLORIDE 0.9 % (FLUSH) 0.9 %
10 SYRINGE (ML) INJECTION EVERY 12 HOURS SCHEDULED
Status: DISCONTINUED | OUTPATIENT
Start: 2025-07-29 | End: 2025-07-29 | Stop reason: HOSPADM

## 2025-07-29 RX ORDER — INSULIN LISPRO 100 [IU]/ML
1-200 INJECTION, SOLUTION INTRAVENOUS; SUBCUTANEOUS
Status: DISCONTINUED | OUTPATIENT
Start: 2025-07-29 | End: 2025-08-02 | Stop reason: HOSPADM

## 2025-07-29 RX ORDER — ONDANSETRON 2 MG/ML
4 INJECTION INTRAMUSCULAR; INTRAVENOUS EVERY 6 HOURS PRN
Status: DISCONTINUED | OUTPATIENT
Start: 2025-07-29 | End: 2025-08-02 | Stop reason: HOSPADM

## 2025-07-29 RX ORDER — SODIUM CHLORIDE 0.9 % (FLUSH) 0.9 %
10 SYRINGE (ML) INJECTION AS NEEDED
Status: DISCONTINUED | OUTPATIENT
Start: 2025-07-29 | End: 2025-07-29 | Stop reason: HOSPADM

## 2025-07-29 RX ORDER — CEFAZOLIN SODIUM 1 G/3ML
INJECTION, POWDER, FOR SOLUTION INTRAMUSCULAR; INTRAVENOUS AS NEEDED
Status: DISCONTINUED | OUTPATIENT
Start: 2025-07-29 | End: 2025-07-30 | Stop reason: SURG

## 2025-07-29 RX ORDER — HEPARIN SODIUM 5000 [USP'U]/ML
5000 INJECTION, SOLUTION INTRAVENOUS; SUBCUTANEOUS EVERY 12 HOURS SCHEDULED
Status: DISCONTINUED | OUTPATIENT
Start: 2025-07-30 | End: 2025-08-02 | Stop reason: HOSPADM

## 2025-07-29 RX ORDER — HYDROMORPHONE HCL/0.9% NACL/PF 6 MG/30 ML
PATIENT CONTROLLED ANALGESIA SYRINGE INTRAVENOUS CONTINUOUS
Status: DISCONTINUED | OUTPATIENT
Start: 2025-07-29 | End: 2025-07-30

## 2025-07-29 RX ORDER — NOREPINEPHRINE BITARTRATE 0.03 MG/ML
INJECTION, SOLUTION INTRAVENOUS CONTINUOUS PRN
Status: DISCONTINUED | OUTPATIENT
Start: 2025-07-29 | End: 2025-07-30 | Stop reason: SURG

## 2025-07-29 RX ORDER — ALBUMIN HUMAN 50 G/1000ML
SOLUTION INTRAVENOUS CONTINUOUS PRN
Status: DISCONTINUED | OUTPATIENT
Start: 2025-07-29 | End: 2025-07-30 | Stop reason: SURG

## 2025-07-29 RX ORDER — HYDROMORPHONE HYDROCHLORIDE 1 MG/ML
0.5 INJECTION, SOLUTION INTRAMUSCULAR; INTRAVENOUS; SUBCUTANEOUS
Status: DISCONTINUED | OUTPATIENT
Start: 2025-07-29 | End: 2025-07-29 | Stop reason: HOSPADM

## 2025-07-29 RX ORDER — DEXTROSE MONOHYDRATE 25 G/50ML
12.5 INJECTION, SOLUTION INTRAVENOUS AS NEEDED
Status: DISCONTINUED | OUTPATIENT
Start: 2025-07-29 | End: 2025-07-29 | Stop reason: HOSPADM

## 2025-07-29 RX ORDER — ASCORBIC ACID 500 MG
1000 TABLET ORAL DAILY
Status: DISCONTINUED | OUTPATIENT
Start: 2025-07-29 | End: 2025-08-02 | Stop reason: HOSPADM

## 2025-07-29 RX ORDER — ONDANSETRON 2 MG/ML
4 INJECTION INTRAMUSCULAR; INTRAVENOUS
Status: DISCONTINUED | OUTPATIENT
Start: 2025-07-29 | End: 2025-07-29 | Stop reason: HOSPADM

## 2025-07-29 RX ORDER — SODIUM CHLORIDE 9 MG/ML
40 INJECTION, SOLUTION INTRAVENOUS AS NEEDED
Status: DISCONTINUED | OUTPATIENT
Start: 2025-07-29 | End: 2025-08-02 | Stop reason: HOSPADM

## 2025-07-29 RX ORDER — ZINC SULFATE 50(220)MG
220 CAPSULE ORAL DAILY
Status: DISCONTINUED | OUTPATIENT
Start: 2025-07-29 | End: 2025-08-02 | Stop reason: HOSPADM

## 2025-07-29 RX ORDER — IBUPROFEN 600 MG/1
600 TABLET, FILM COATED ORAL EVERY 6 HOURS PRN
Status: DISCONTINUED | OUTPATIENT
Start: 2025-07-29 | End: 2025-07-29 | Stop reason: HOSPADM

## 2025-07-29 RX ORDER — SODIUM CHLORIDE 0.9 % (FLUSH) 0.9 %
3 SYRINGE (ML) INJECTION AS NEEDED
Status: DISCONTINUED | OUTPATIENT
Start: 2025-07-29 | End: 2025-07-29 | Stop reason: HOSPADM

## 2025-07-29 RX ORDER — POLYETHYLENE GLYCOL 3350 17 G/17G
17 POWDER, FOR SOLUTION ORAL DAILY
Status: DISCONTINUED | OUTPATIENT
Start: 2025-07-29 | End: 2025-08-02 | Stop reason: HOSPADM

## 2025-07-29 RX ORDER — ONDANSETRON 4 MG/1
4 TABLET, ORALLY DISINTEGRATING ORAL EVERY 6 HOURS PRN
Status: DISCONTINUED | OUTPATIENT
Start: 2025-07-29 | End: 2025-08-02 | Stop reason: HOSPADM

## 2025-07-29 RX ORDER — DROPERIDOL 2.5 MG/ML
0.62 INJECTION, SOLUTION INTRAMUSCULAR; INTRAVENOUS ONCE AS NEEDED
Status: DISCONTINUED | OUTPATIENT
Start: 2025-07-29 | End: 2025-07-29 | Stop reason: HOSPADM

## 2025-07-29 RX ORDER — INSULIN LISPRO 100 [IU]/ML
1-200 INJECTION, SOLUTION INTRAVENOUS; SUBCUTANEOUS AS NEEDED
Status: DISCONTINUED | OUTPATIENT
Start: 2025-07-29 | End: 2025-08-02 | Stop reason: HOSPADM

## 2025-07-29 RX ORDER — ROCURONIUM BROMIDE 10 MG/ML
INJECTION, SOLUTION INTRAVENOUS AS NEEDED
Status: DISCONTINUED | OUTPATIENT
Start: 2025-07-29 | End: 2025-07-30 | Stop reason: SURG

## 2025-07-29 RX ORDER — VITAMIN A 3000 MCG
20000 CAPSULE ORAL DAILY
Status: DISCONTINUED | OUTPATIENT
Start: 2025-07-29 | End: 2025-08-02 | Stop reason: HOSPADM

## 2025-07-29 RX ORDER — KETAMINE HCL IN NACL, ISO-OSM 100MG/10ML
SYRINGE (ML) INJECTION AS NEEDED
Status: DISCONTINUED | OUTPATIENT
Start: 2025-07-29 | End: 2025-07-30 | Stop reason: SURG

## 2025-07-29 RX ORDER — OXYCODONE AND ACETAMINOPHEN 10; 325 MG/1; MG/1
1 TABLET ORAL EVERY 4 HOURS PRN
Status: DISCONTINUED | OUTPATIENT
Start: 2025-07-29 | End: 2025-07-29 | Stop reason: HOSPADM

## 2025-07-29 RX ORDER — NALOXONE HCL 0.4 MG/ML
0.04 VIAL (ML) INJECTION AS NEEDED
Status: DISCONTINUED | OUTPATIENT
Start: 2025-07-29 | End: 2025-07-29 | Stop reason: HOSPADM

## 2025-07-29 RX ORDER — MAGNESIUM HYDROXIDE 1200 MG/15ML
LIQUID ORAL AS NEEDED
Status: DISCONTINUED | OUTPATIENT
Start: 2025-07-29 | End: 2025-07-29 | Stop reason: HOSPADM

## 2025-07-29 RX ORDER — VANCOMYCIN HYDROCHLORIDE 1 G/20ML
INJECTION, POWDER, LYOPHILIZED, FOR SOLUTION INTRAVENOUS AS NEEDED
Status: DISCONTINUED | OUTPATIENT
Start: 2025-07-29 | End: 2025-07-29 | Stop reason: HOSPADM

## 2025-07-29 RX ORDER — NICOTINE POLACRILEX 4 MG
15 LOZENGE BUCCAL
Status: DISCONTINUED | OUTPATIENT
Start: 2025-07-29 | End: 2025-08-02 | Stop reason: HOSPADM

## 2025-07-29 RX ORDER — AMOXICILLIN 250 MG
2 CAPSULE ORAL 2 TIMES DAILY
Status: DISCONTINUED | OUTPATIENT
Start: 2025-07-29 | End: 2025-08-02 | Stop reason: HOSPADM

## 2025-07-29 RX ORDER — FENTANYL CITRATE 50 UG/ML
25 INJECTION, SOLUTION INTRAMUSCULAR; INTRAVENOUS
Status: DISCONTINUED | OUTPATIENT
Start: 2025-07-29 | End: 2025-07-29 | Stop reason: HOSPADM

## 2025-07-29 RX ORDER — DEXTROSE MONOHYDRATE 25 G/50ML
10-50 INJECTION, SOLUTION INTRAVENOUS
Status: DISCONTINUED | OUTPATIENT
Start: 2025-07-29 | End: 2025-08-02 | Stop reason: HOSPADM

## 2025-07-29 RX ORDER — BUPIVACAINE HYDROCHLORIDE AND EPINEPHRINE 2.5; 5 MG/ML; UG/ML
INJECTION, SOLUTION EPIDURAL; INFILTRATION; INTRACAUDAL; PERINEURAL AS NEEDED
Status: DISCONTINUED | OUTPATIENT
Start: 2025-07-29 | End: 2025-07-29 | Stop reason: HOSPADM

## 2025-07-29 RX ORDER — FENTANYL CITRATE 50 UG/ML
INJECTION, SOLUTION INTRAMUSCULAR; INTRAVENOUS AS NEEDED
Status: DISCONTINUED | OUTPATIENT
Start: 2025-07-29 | End: 2025-07-30 | Stop reason: SURG

## 2025-07-29 RX ORDER — LOSARTAN POTASSIUM 25 MG/1
25 TABLET ORAL DAILY
Status: DISCONTINUED | OUTPATIENT
Start: 2025-07-30 | End: 2025-08-02 | Stop reason: HOSPADM

## 2025-07-29 RX ORDER — HYDROCODONE BITARTRATE AND ACETAMINOPHEN 7.5; 325 MG/1; MG/1
1 TABLET ORAL EVERY 4 HOURS PRN
Status: DISCONTINUED | OUTPATIENT
Start: 2025-07-29 | End: 2025-07-31

## 2025-07-29 RX ORDER — SODIUM CHLORIDE 9 MG/ML
30 INJECTION, SOLUTION INTRAVENOUS CONTINUOUS PRN
Status: DISCONTINUED | OUTPATIENT
Start: 2025-07-29 | End: 2025-07-30

## 2025-07-29 RX ORDER — NALOXONE HCL 0.4 MG/ML
0.1 VIAL (ML) INJECTION
Status: DISCONTINUED | OUTPATIENT
Start: 2025-07-29 | End: 2025-07-30

## 2025-07-29 RX ORDER — FLUMAZENIL 0.1 MG/ML
0.2 INJECTION INTRAVENOUS AS NEEDED
Status: DISCONTINUED | OUTPATIENT
Start: 2025-07-29 | End: 2025-07-29 | Stop reason: HOSPADM

## 2025-07-29 RX ORDER — METFORMIN HYDROCHLORIDE 500 MG/1
500 TABLET, EXTENDED RELEASE ORAL 2 TIMES DAILY WITH MEALS
Status: DISCONTINUED | OUTPATIENT
Start: 2025-07-30 | End: 2025-08-02 | Stop reason: HOSPADM

## 2025-07-29 RX ORDER — MIDAZOLAM HYDROCHLORIDE 2 MG/2ML
2 INJECTION, SOLUTION INTRAMUSCULAR; INTRAVENOUS ONCE
Status: COMPLETED | OUTPATIENT
Start: 2025-07-29 | End: 2025-07-29

## 2025-07-29 RX ORDER — SUCCINYLCHOLINE/SOD CL,ISO/PF 200MG/10ML
SYRINGE (ML) INTRAVENOUS AS NEEDED
Status: DISCONTINUED | OUTPATIENT
Start: 2025-07-29 | End: 2025-07-30 | Stop reason: SURG

## 2025-07-29 RX ORDER — LIDOCAINE HYDROCHLORIDE 20 MG/ML
INJECTION, SOLUTION EPIDURAL; INFILTRATION; INTRACAUDAL; PERINEURAL AS NEEDED
Status: DISCONTINUED | OUTPATIENT
Start: 2025-07-29 | End: 2025-07-30 | Stop reason: SURG

## 2025-07-29 RX ORDER — PROPOFOL 10 MG/ML
VIAL (ML) INTRAVENOUS AS NEEDED
Status: DISCONTINUED | OUTPATIENT
Start: 2025-07-29 | End: 2025-07-30 | Stop reason: SURG

## 2025-07-29 RX ORDER — LIDOCAINE HYDROCHLORIDE 10 MG/ML
0.5 INJECTION, SOLUTION EPIDURAL; INFILTRATION; INTRACAUDAL; PERINEURAL ONCE AS NEEDED
Status: DISCONTINUED | OUTPATIENT
Start: 2025-07-29 | End: 2025-07-29 | Stop reason: HOSPADM

## 2025-07-29 RX ORDER — SODIUM CHLORIDE 0.9 % (FLUSH) 0.9 %
10 SYRINGE (ML) INJECTION AS NEEDED
Status: DISCONTINUED | OUTPATIENT
Start: 2025-07-29 | End: 2025-08-02 | Stop reason: HOSPADM

## 2025-07-29 RX ORDER — PHENYLEPHRINE HCL IN 0.9% NACL 1 MG/10 ML
SYRINGE (ML) INTRAVENOUS AS NEEDED
Status: DISCONTINUED | OUTPATIENT
Start: 2025-07-29 | End: 2025-07-30 | Stop reason: SURG

## 2025-07-29 RX ORDER — SODIUM CHLORIDE 0.9 % (FLUSH) 0.9 %
10 SYRINGE (ML) INJECTION EVERY 12 HOURS SCHEDULED
Status: DISCONTINUED | OUTPATIENT
Start: 2025-07-29 | End: 2025-08-02 | Stop reason: HOSPADM

## 2025-07-29 RX ORDER — SODIUM CHLORIDE, SODIUM LACTATE, POTASSIUM CHLORIDE, CALCIUM CHLORIDE 600; 310; 30; 20 MG/100ML; MG/100ML; MG/100ML; MG/100ML
1000 INJECTION, SOLUTION INTRAVENOUS CONTINUOUS
Status: DISCONTINUED | OUTPATIENT
Start: 2025-07-29 | End: 2025-07-29

## 2025-07-29 RX ORDER — NOREPINEPHRINE BITARTRATE 0.03 MG/ML
.02-.3 INJECTION, SOLUTION INTRAVENOUS
Status: DISCONTINUED | OUTPATIENT
Start: 2025-07-29 | End: 2025-08-01 | Stop reason: HOSPADM

## 2025-07-29 RX ORDER — SEMAGLUTIDE 0.68 MG/ML
INJECTION, SOLUTION SUBCUTANEOUS WEEKLY
COMMUNITY

## 2025-07-29 RX ORDER — LABETALOL HYDROCHLORIDE 5 MG/ML
5 INJECTION, SOLUTION INTRAVENOUS
Status: DISCONTINUED | OUTPATIENT
Start: 2025-07-29 | End: 2025-07-29 | Stop reason: HOSPADM

## 2025-07-29 RX ORDER — LIDOCAINE 4 G/G
1 PATCH TOPICAL
Status: DISCONTINUED | OUTPATIENT
Start: 2025-07-29 | End: 2025-08-02 | Stop reason: HOSPADM

## 2025-07-29 RX ORDER — MIDAZOLAM HYDROCHLORIDE 2 MG/2ML
0.5 INJECTION, SOLUTION INTRAMUSCULAR; INTRAVENOUS
Status: DISCONTINUED | OUTPATIENT
Start: 2025-07-29 | End: 2025-07-29 | Stop reason: HOSPADM

## 2025-07-29 RX ORDER — HYDROCODONE BITARTRATE AND ACETAMINOPHEN 10; 325 MG/1; MG/1
1 TABLET ORAL EVERY 4 HOURS PRN
Status: DISCONTINUED | OUTPATIENT
Start: 2025-07-29 | End: 2025-07-31

## 2025-07-29 RX ORDER — CYCLOBENZAPRINE HCL 10 MG
10 TABLET ORAL 3 TIMES DAILY
Status: DISCONTINUED | OUTPATIENT
Start: 2025-07-29 | End: 2025-07-30

## 2025-07-29 RX ORDER — ATORVASTATIN CALCIUM 40 MG/1
40 TABLET, FILM COATED ORAL NIGHTLY
Status: DISCONTINUED | OUTPATIENT
Start: 2025-07-29 | End: 2025-08-02 | Stop reason: HOSPADM

## 2025-07-29 RX ORDER — IBUPROFEN 600 MG/1
1 TABLET ORAL
Status: DISCONTINUED | OUTPATIENT
Start: 2025-07-29 | End: 2025-08-02 | Stop reason: HOSPADM

## 2025-07-29 RX ORDER — ASPIRIN 81 MG/1
81 TABLET ORAL DAILY
Status: DISCONTINUED | OUTPATIENT
Start: 2025-07-30 | End: 2025-08-02 | Stop reason: HOSPADM

## 2025-07-29 RX ADMIN — PROPOFOL 125 MCG/KG/MIN: 10 INJECTION, EMULSION INTRAVENOUS at 13:24

## 2025-07-29 RX ADMIN — HYDROCODONE BITARTRATE AND ACETAMINOPHEN 1 TABLET: 10; 325 TABLET ORAL at 19:54

## 2025-07-29 RX ADMIN — CEFAZOLIN 2 G: 1 INJECTION, POWDER, FOR SOLUTION INTRAMUSCULAR; INTRAVENOUS at 16:35

## 2025-07-29 RX ADMIN — FENTANYL CITRATE 100 MCG: 50 INJECTION, SOLUTION INTRAMUSCULAR; INTRAVENOUS at 13:16

## 2025-07-29 RX ADMIN — Medication: at 20:09

## 2025-07-29 RX ADMIN — LIDOCAINE HYDROCHLORIDE 100 MG: 20 INJECTION, SOLUTION EPIDURAL; INFILTRATION; INTRACAUDAL; PERINEURAL at 11:56

## 2025-07-29 RX ADMIN — OXYCODONE HYDROCHLORIDE AND ACETAMINOPHEN 1000 MG: 500 TABLET ORAL at 20:50

## 2025-07-29 RX ADMIN — Medication 30 MG: at 13:16

## 2025-07-29 RX ADMIN — Medication 50 MG: at 11:56

## 2025-07-29 RX ADMIN — CEFAZOLIN 2 G: 2 INJECTION, POWDER, FOR SOLUTION INTRAMUSCULAR; INTRAVENOUS at 12:21

## 2025-07-29 RX ADMIN — INSULIN GLARGINE 25 UNITS: 100 INJECTION, SOLUTION SUBCUTANEOUS at 20:51

## 2025-07-29 RX ADMIN — ALBUMIN HUMAN: 0.05 INJECTION, SOLUTION INTRAVENOUS at 15:54

## 2025-07-29 RX ADMIN — ROCURONIUM 10 MG: 50 INJECTION, SOLUTION INTRAVENOUS at 11:56

## 2025-07-29 RX ADMIN — PROPOFOL 125 MCG/KG/MIN: 10 INJECTION, EMULSION INTRAVENOUS at 14:23

## 2025-07-29 RX ADMIN — PROPOFOL 125 MCG/KG/MIN: 10 INJECTION, EMULSION INTRAVENOUS at 15:28

## 2025-07-29 RX ADMIN — FENTANYL CITRATE 100 MCG: 50 INJECTION, SOLUTION INTRAMUSCULAR; INTRAVENOUS at 12:04

## 2025-07-29 RX ADMIN — Medication 10 ML: at 20:39

## 2025-07-29 RX ADMIN — PROPOFOL 50 MG: 10 INJECTION, EMULSION INTRAVENOUS at 13:16

## 2025-07-29 RX ADMIN — Medication 100 MCG: at 17:05

## 2025-07-29 RX ADMIN — CEFAZOLIN 2 G: 2 INJECTION, POWDER, FOR SOLUTION INTRAMUSCULAR; INTRAVENOUS at 23:58

## 2025-07-29 RX ADMIN — NOREPINEPHRINE BITARTRATE 0.02 MCG/KG/MIN: 0.03 INJECTION, SOLUTION INTRAVENOUS at 16:29

## 2025-07-29 RX ADMIN — Medication 200 MCG: at 16:51

## 2025-07-29 RX ADMIN — MIDAZOLAM HYDROCHLORIDE 2 MG: 1 INJECTION, SOLUTION INTRAMUSCULAR; INTRAVENOUS at 11:03

## 2025-07-29 RX ADMIN — PROPOFOL 125 MCG/KG/MIN: 10 INJECTION, EMULSION INTRAVENOUS at 16:57

## 2025-07-29 RX ADMIN — SODIUM CHLORIDE, POTASSIUM CHLORIDE, SODIUM LACTATE AND CALCIUM CHLORIDE 1000 ML: 600; 310; 30; 20 INJECTION, SOLUTION INTRAVENOUS at 10:41

## 2025-07-29 RX ADMIN — Medication 20 MG: at 12:47

## 2025-07-29 RX ADMIN — PROPOFOL 100 MG: 10 INJECTION, EMULSION INTRAVENOUS at 11:56

## 2025-07-29 RX ADMIN — LIDOCAINE 1 PATCH: 4 PATCH TOPICAL at 20:50

## 2025-07-29 RX ADMIN — TRANEXAMIC ACID 1 MG/KG/HR: 100 INJECTION INTRAVENOUS at 12:46

## 2025-07-29 RX ADMIN — FENTANYL CITRATE 100 MCG: 50 INJECTION, SOLUTION INTRAMUSCULAR; INTRAVENOUS at 15:29

## 2025-07-29 RX ADMIN — Medication 20000 UNITS: at 20:50

## 2025-07-29 RX ADMIN — FENTANYL CITRATE 150 MCG: 50 INJECTION, SOLUTION INTRAMUSCULAR; INTRAVENOUS at 11:56

## 2025-07-29 RX ADMIN — ATORVASTATIN CALCIUM 40 MG: 40 TABLET, FILM COATED ORAL at 20:50

## 2025-07-29 RX ADMIN — PROPOFOL 125 MCG/KG/MIN: 10 INJECTION, EMULSION INTRAVENOUS at 11:57

## 2025-07-29 RX ADMIN — FENTANYL CITRATE 150 MCG: 50 INJECTION, SOLUTION INTRAMUSCULAR; INTRAVENOUS at 12:58

## 2025-07-29 RX ADMIN — SODIUM CHLORIDE, POTASSIUM CHLORIDE, SODIUM LACTATE AND CALCIUM CHLORIDE: 600; 310; 30; 20 INJECTION, SOLUTION INTRAVENOUS at 16:28

## 2025-07-29 RX ADMIN — SODIUM CHLORIDE, POTASSIUM CHLORIDE, SODIUM LACTATE AND CALCIUM CHLORIDE: 600; 310; 30; 20 INJECTION, SOLUTION INTRAVENOUS at 15:50

## 2025-07-29 RX ADMIN — FENTANYL CITRATE 100 MCG: 50 INJECTION, SOLUTION INTRAMUSCULAR; INTRAVENOUS at 14:50

## 2025-07-29 RX ADMIN — Medication 220 MG: at 20:50

## 2025-07-29 RX ADMIN — CYCLOBENZAPRINE HYDROCHLORIDE 10 MG: 10 TABLET, FILM COATED ORAL at 20:50

## 2025-07-29 RX ADMIN — ALBUMIN HUMAN: 0.05 INJECTION, SOLUTION INTRAVENOUS at 15:44

## 2025-07-29 RX ADMIN — Medication 100 MG: at 11:56

## 2025-07-29 RX ADMIN — Medication 100 MCG: at 16:03

## 2025-07-29 RX ADMIN — SODIUM CHLORIDE, POTASSIUM CHLORIDE, SODIUM LACTATE AND CALCIUM CHLORIDE 1000 ML: 600; 310; 30; 20 INJECTION, SOLUTION INTRAVENOUS at 11:08

## 2025-07-29 NOTE — ANESTHESIA PREPROCEDURE EVALUATION
Anesthesia Evaluation     Patient summary reviewed   no history of anesthetic complications:   NPO Solid Status: > 8 hours             Airway   Mallampati: II  Dental    (+) upper dentures    Pulmonary    (+) a smoker, COPD,  (-) asthma, sleep apnea  Cardiovascular   Exercise tolerance: good (4-7 METS)    (+) hypertension, hyperlipidemia  (-) pacemaker, past MI, angina, cardiac stents      Neuro/Psych  (-) seizures, TIA, CVA  GI/Hepatic/Renal/Endo    (+) obesity, diabetes mellitus  (-) GERD, liver disease, no renal disease    Musculoskeletal     Abdominal    Substance History      OB/GYN          Other      history of cancer (adenocarcinoma with spine and lung mets)                  Anesthesia Plan    ASA 3     general     intravenous induction     Anesthetic plan, risks, benefits, and alternatives have been provided, discussed and informed consent has been obtained with: patient.    CODE STATUS:

## 2025-07-29 NOTE — DISCHARGE INSTRUCTIONS
[facility]    Postoperative care following spine surgery  Dear Patient,  You have recently undergone spine surgery (instrumentation T3-L3 with L1 corpectomy) and are now ready to go home. These written instructions are intended to help you to recover quickly.  If you have ANY QUESTIONS about your condition prior to discharge please ask Dr. Marte. In particular, if you have concerns about going home discuss them now. We do not want you to go until you are completely comfortable leaving the hospital.   If you have ANY QUESTIONS about your condition after you go home call your doctor. The number is [phone] which is answered 24 hours a day. During regular working hours a  will connect you to your doctor, one of his partners, or one of our nurses. At night or on weekends the answering service will connect you with the physician on call. DO NOT HESITATE to call. We want to help you with any problems.     Deep vein thrombosis/ pulmonary embolus  Some patients who undergo surgery develop blood clots in the veins of the legs. These clots can cause pain or swelling in the legs, or may cause no obvious problem. They can break free from the legs and travel to the lungs causing shortness of breath and/or chest pain.you develop pain or swelling in your legs after surgery, call your doctor. If you develop breathing problems or chest pain after surgery, call [phone].    Neurological Deficit  Neurological deficits are problems with brain function like speech difficulty, weakness, numbness, imbalance, etc. These deficits may be present before or after spine surgery. Prior to discharge your doctor will make sure that all treatment needed to help you recover from such deficits has been instituted. He will also make sure that these deficits are stable or improving. After you go home, if you think any of your spine problems are getting worse, not better, it may be a sign of bleeding, infection, or other  problems. Call your doctor. He will order tests and prescribe treatment as needed.    Activity Restrictions  In general after surgery you will be on restricted activities for several weeks to several months depending on the nature of your operation. For six weeks you should avoid heavy lifting (over 8 lbs or roughly a gallon of milk), bending, or stooping. You may be released by Dr. Marte earlier. You may return to driving when you feel comfortable enough that you can safely handle your vehicle AND you are not taking narcotic pain medications. This may be as early as 10 - 14 days, but could be longer as instructed by your neurosurgeon. After surgery you may gradually increase your activities, as you are able to tolerate. Walking is an excellent low impact exercise to begin after surgery. You should try to make regular walks of 15 to 30 minutes part of your postoperative recovery as you become able to do so. It typically requires a period of days to a few weeks to reach this level of activity and should be done in a gradual fashion. Your return to work will depend on your job requirements. In general, you may return to light duty work as soon as you feel comfortable and are not taking routine narcotic pain medications.    Medication  It is important to take your medication EXACTLY as prescribed. Some patients are reluctant to take pain medication. It is perfectly fine to take pain medication for several weeks after surgery. We want to eliminate pain whenever possible. Many pain medications can cause nausea (sick to your stomach), constipation (inability to poop), or itching. Nausea may be minimized by taking the medication with food. Constipation can be relieved by taking stool softeners and/ or laxatives that you can purchase over the counter as needed.    It is important to realize that no pain after surgery is an unrealistic expectation.  Pain medication will never reduce your pain score to zero.  The goal of  pain medicine is to reduce your pain to the point you can move, take care of yourself, and participate in therapy.  Make sure to work with your caregiver to determine what is an adequate level of pain control to promote healthy movement and then take your medication to reach this goal.      You have undergone a 3 or more level lumbar fusion (4) surgery which you are expected to recover from over several weeks.     Use of opioids immediately following surgery is often necessary.  Pain after surgery results in decreased quality of life, surgical complications, and prolonged rehabilitation.  Thus in certain situations, the benefits of a limited course of opioids may outweigh the risk.      However, multiple studies have found that patients of all ages frequently take fewer opioid pills than the amount prescribed after common surgeries.  In some cases they do not take any of the prescribed medications at all.  This results in excess opioid pills that are accessible to others, raise a concern for misuse, can be stolen by family members, can result in later addiction, or can often be used in overdose.  Therefore we are going to make every effort to only prescribe as much pain medication as necessary to limit the amount of pills that are left over after you recover.      First-line treatment should include nonopioid analgesics.  Examples of these would be Tylenol or nonsteroidal anti-inflammatories such as ibuprofen or Aleve.  - Tylenol 1000 mg every six hours as needed    Second-line treatment. If the above first-line treatments are insufficient to control your pain you have also been provided a small dose of opioids.  In order to avoid addiction you should take the lowest amount possible.    Type IV: Opioids prescription for 14 days or less.  Prescriptions will be provided weekly.  (week 1 every 6 hours and week 2 every 8 hours).  Will consider an additional 14-day course upon request (week 3 every 12 hours and if  indicated, week for daily)    EXAMPLE IF APPLICABLE:  Please taper your pain medications to avoid long term addiction.  Week 1: Take your pain medication no more than ever 6 hours as needed for severe pain.  Week 2: Take your pain medication no more than every 8 hours as needed for severe pain.  Week 3: Take your pain medication no more than every 12 hours as needed for unresolved pain.    These recommendations are based on ...  CDC guidelines for control and prevention of postsurgical pain,   [place] opioid prescribing engagement network (OPEN),   Janet pickett in [facility] medical directors group prescribing opioids for postoperative pain-supplemental guidance (2018)    Wound Care  Your incision is held together with dissolvable sutures that do not need to be removed.     Seventy-two hours after surgery it is OK to get the wound wet, so you can take a shower or bath.     You do not need to put any medication (like Neosporin or Vitamin E) on the wound. Scrubbing the wound should be avoided until the staples nondissolvable sutures come out.     Heating pads have the potential to cause very serious burns, especially in patients using narcotic pain medications (e.g. Oxycodone, Oxycontin, etc.) Do not use heating pads during your recovery.      No nicotine products, including second-hand smoke, gum or patches. Nicotine will delay healing and increase the likelihood of a surgical complication. For help quitting, call the Quitline: 1-522.480.5479     Potential wound problems include the following:  Infection--If the wound becomes red, tender, swollen, or warm it may be infected. Infection is often accompanied by fever. If you think your wound might be infected you should call your doctor. Often you can send us a picture of the wound so we can better evaluate it.   Drainage--Fluid should not drain from your wound. If it does, call your doctor. Colored fluid may indicate infection. Clear fluid may  indicate leakage of spinal fluid.   Dehiscence--If the wound does not heal properly it may open up along the staple line. This is called dehiscence. Call us immediately.   Sutures--Occasionally, one of the buried threads (sutures) may work through the skin. If you think this has happened call your doctor.   Swelling--Spinal fluid or blood may collect under the skin. This is usually harmless, but needs to be evaluated. Call your doctor.     How to contact your doctor  Dr. Marte and his team did your surgery and, therefore, are likely to know more about your condition than any other physicians. We are immediately available to help you with any problems after surgery. Please call us for any concerns at the following numbers:  Doctor’s office:  [phone] (answered 24 hours a day)   [facility] : [phone] (alternative emergency number for on-call neurosurgeon)     Specific instructions related to your surgery  Diet: no restrictions, eat a heart healthy diet. If you are diabetic, tightly controlling your blood sugar over the next 2 weeks is crucial in controlling infection.     Activity: as tolerated, no heavy lifting greater than 8 pounds (1 gallon of milk) or strenuous exercise for at least 2 weeks.    Brace/collar instructions: If you have been provided with the brace while in the hospital, please wear as instructed.    Please do not use NSAIDs (Ibuprofen, Toradol, etc) for 4 weeks following spinal fusion, as this can prevent bone growth. Tylenol is acceptable as an over the counter pain management.     Follow up:   Follow up with Miguel ERAZO on 8/14/2025 for post op wound check and with Dr. Marte in the neurosurgery clinic ([phone]) on 9/17/2025.  We have scheduled these appointment(s) for you.            Sincerely,        Robert Marte MD, PhD, MPH

## 2025-07-29 NOTE — ANESTHESIA PROCEDURE NOTES
Airway  Reason: elective    Date/Time: 7/29/2025 11:57 AM  Airway not difficult    General Information and Staff    Patient location during procedure: OR  CRNA/CAA: Husam Alaniz CRNA    Indications and Patient Condition  Indications for airway management: airway protection    Preoxygenated: yes    Mask difficulty assessment: 1 - vent by mask    Final Airway Details    Final airway type: endotracheal airway      Successful airway: ETT  Cuffed: yes   Successful intubation technique: direct laryngoscopy  Adjuncts used in placement: intubating stylet  Endotracheal tube insertion site: oral  Blade: Pepper  Blade size: 2  ETT size (mm): 7.5  Cormack-Lehane Classification: grade I - full view of glottis  Placement verified by: chest auscultation and capnometry   Cuff volume (mL): 10  Measured from: gums  ETT/EBT to gums (cm): 21  ETT/EBT  to teeth (cm): 21  Number of attempts at approach: 1  Assessment: lips, teeth, and gum same as pre-op and atraumatic intubation

## 2025-07-30 ENCOUNTER — APPOINTMENT (OUTPATIENT)
Dept: GENERAL RADIOLOGY | Facility: HOSPITAL | Age: 70
End: 2025-07-30
Payer: MEDICARE

## 2025-07-30 LAB
ALBUMIN SERPL-MCNC: 3 G/DL (ref 3.5–5.2)
ALBUMIN/GLOB SERPL: 1.4 G/DL
ALP SERPL-CCNC: 48 U/L (ref 39–117)
ALT SERPL W P-5'-P-CCNC: 22 U/L (ref 1–41)
ANION GAP SERPL CALCULATED.3IONS-SCNC: 8 MMOL/L (ref 5–15)
AST SERPL-CCNC: 37 U/L (ref 1–40)
BASOPHILS # BLD AUTO: 0.02 10*3/MM3 (ref 0–0.2)
BASOPHILS NFR BLD AUTO: 0.2 % (ref 0–1.5)
BILIRUB SERPL-MCNC: 0.5 MG/DL (ref 0–1.2)
BUN SERPL-MCNC: 11.5 MG/DL (ref 8–23)
BUN/CREAT SERPL: 12.4 (ref 7–25)
CALCIUM SPEC-SCNC: 8.1 MG/DL (ref 8.6–10.5)
CHLORIDE SERPL-SCNC: 108 MMOL/L (ref 98–107)
CO2 SERPL-SCNC: 24 MMOL/L (ref 22–29)
CREAT SERPL-MCNC: 0.93 MG/DL (ref 0.76–1.27)
DEPRECATED RDW RBC AUTO: 40.2 FL (ref 37–54)
EGFRCR SERPLBLD CKD-EPI 2021: 89.4 ML/MIN/1.73
EOSINOPHIL # BLD AUTO: 0.01 10*3/MM3 (ref 0–0.4)
EOSINOPHIL NFR BLD AUTO: 0.1 % (ref 0.3–6.2)
ERYTHROCYTE [DISTWIDTH] IN BLOOD BY AUTOMATED COUNT: 13 % (ref 12.3–15.4)
GLOBULIN UR ELPH-MCNC: 2.2 GM/DL
GLUCOSE BLDC GLUCOMTR-MCNC: 122 MG/DL (ref 70–130)
GLUCOSE BLDC GLUCOMTR-MCNC: 125 MG/DL (ref 70–130)
GLUCOSE BLDC GLUCOMTR-MCNC: 127 MG/DL (ref 70–130)
GLUCOSE BLDC GLUCOMTR-MCNC: 141 MG/DL (ref 70–130)
GLUCOSE SERPL-MCNC: 124 MG/DL (ref 65–99)
HCT VFR BLD AUTO: 23.7 % (ref 37.5–51)
HGB BLD-MCNC: 7.9 G/DL (ref 13–17.7)
IMM GRANULOCYTES # BLD AUTO: 0.07 10*3/MM3 (ref 0–0.05)
IMM GRANULOCYTES NFR BLD AUTO: 0.6 % (ref 0–0.5)
LYMPHOCYTES # BLD AUTO: 1.69 10*3/MM3 (ref 0.7–3.1)
LYMPHOCYTES NFR BLD AUTO: 14.8 % (ref 19.6–45.3)
MCH RBC QN AUTO: 28.4 PG (ref 26.6–33)
MCHC RBC AUTO-ENTMCNC: 33.3 G/DL (ref 31.5–35.7)
MCV RBC AUTO: 85.3 FL (ref 79–97)
MONOCYTES # BLD AUTO: 0.86 10*3/MM3 (ref 0.1–0.9)
MONOCYTES NFR BLD AUTO: 7.5 % (ref 5–12)
NEUTROPHILS NFR BLD AUTO: 76.8 % (ref 42.7–76)
NEUTROPHILS NFR BLD AUTO: 8.8 10*3/MM3 (ref 1.7–7)
NRBC BLD AUTO-RTO: 0 /100 WBC (ref 0–0.2)
PLATELET # BLD AUTO: 185 10*3/MM3 (ref 140–450)
PMV BLD AUTO: 10.2 FL (ref 6–12)
POTASSIUM SERPL-SCNC: 3.8 MMOL/L (ref 3.5–5.2)
PROT SERPL-MCNC: 5.2 G/DL (ref 6–8.5)
QT INTERVAL: 302 MS
QTC INTERVAL: 457 MS
RBC # BLD AUTO: 2.78 10*6/MM3 (ref 4.14–5.8)
SODIUM SERPL-SCNC: 140 MMOL/L (ref 136–145)
WBC NRBC COR # BLD AUTO: 11.45 10*3/MM3 (ref 3.4–10.8)

## 2025-07-30 PROCEDURE — 99024 POSTOP FOLLOW-UP VISIT: CPT | Performed by: NURSE PRACTITIONER

## 2025-07-30 PROCEDURE — 93005 ELECTROCARDIOGRAM TRACING: CPT | Performed by: INTERNAL MEDICINE

## 2025-07-30 PROCEDURE — 25010000002 CEFAZOLIN PER 500 MG: Performed by: NURSE PRACTITIONER

## 2025-07-30 PROCEDURE — 97162 PT EVAL MOD COMPLEX 30 MIN: CPT

## 2025-07-30 PROCEDURE — 63710000001 INSULIN GLARGINE PER 5 UNITS: Performed by: NURSE PRACTITIONER

## 2025-07-30 PROCEDURE — 97165 OT EVAL LOW COMPLEX 30 MIN: CPT | Performed by: OCCUPATIONAL THERAPIST

## 2025-07-30 PROCEDURE — 82948 REAGENT STRIP/BLOOD GLUCOSE: CPT

## 2025-07-30 PROCEDURE — 25010000002 HEPARIN (PORCINE) PER 1000 UNITS: Performed by: NURSE PRACTITIONER

## 2025-07-30 PROCEDURE — 80053 COMPREHEN METABOLIC PANEL: CPT | Performed by: NURSE PRACTITIONER

## 2025-07-30 PROCEDURE — 82948 REAGENT STRIP/BLOOD GLUCOSE: CPT | Performed by: NURSE PRACTITIONER

## 2025-07-30 PROCEDURE — 85025 COMPLETE CBC W/AUTO DIFF WBC: CPT | Performed by: NURSE PRACTITIONER

## 2025-07-30 RX ORDER — GABAPENTIN 100 MG/1
100 CAPSULE ORAL 3 TIMES DAILY
Status: DISCONTINUED | OUTPATIENT
Start: 2025-07-30 | End: 2025-08-02 | Stop reason: HOSPADM

## 2025-07-30 RX ORDER — CHLORHEXIDINE GLUCONATE 500 MG/1
1 CLOTH TOPICAL DAILY
Status: DISCONTINUED | OUTPATIENT
Start: 2025-07-30 | End: 2025-08-02 | Stop reason: HOSPADM

## 2025-07-30 RX ADMIN — CEFAZOLIN 2 G: 2 INJECTION, POWDER, FOR SOLUTION INTRAMUSCULAR; INTRAVENOUS at 08:06

## 2025-07-30 RX ADMIN — Medication 10 ML: at 20:39

## 2025-07-30 RX ADMIN — ATORVASTATIN CALCIUM 40 MG: 40 TABLET, FILM COATED ORAL at 20:35

## 2025-07-30 RX ADMIN — HYDROCODONE BITARTRATE AND ACETAMINOPHEN 1 TABLET: 10; 325 TABLET ORAL at 13:02

## 2025-07-30 RX ADMIN — GABAPENTIN 100 MG: 100 CAPSULE ORAL at 16:18

## 2025-07-30 RX ADMIN — METFORMIN HYDROCHLORIDE 500 MG: 500 TABLET, EXTENDED RELEASE ORAL at 18:22

## 2025-07-30 RX ADMIN — LIDOCAINE 1 PATCH: 4 PATCH TOPICAL at 08:07

## 2025-07-30 RX ADMIN — HYDROCODONE BITARTRATE AND ACETAMINOPHEN 1 TABLET: 10; 325 TABLET ORAL at 00:11

## 2025-07-30 RX ADMIN — HYDROCODONE BITARTRATE AND ACETAMINOPHEN 1 TABLET: 10; 325 TABLET ORAL at 05:33

## 2025-07-30 RX ADMIN — Medication 10 ML: at 08:08

## 2025-07-30 RX ADMIN — METFORMIN HYDROCHLORIDE 500 MG: 500 TABLET, EXTENDED RELEASE ORAL at 08:06

## 2025-07-30 RX ADMIN — TIZANIDINE 4 MG: 4 TABLET ORAL at 09:27

## 2025-07-30 RX ADMIN — HEPARIN SODIUM 5000 UNITS: 5000 INJECTION INTRAVENOUS; SUBCUTANEOUS at 05:29

## 2025-07-30 RX ADMIN — Medication 1 APPLICATION: at 20:35

## 2025-07-30 RX ADMIN — GABAPENTIN 100 MG: 100 CAPSULE ORAL at 09:27

## 2025-07-30 RX ADMIN — HYDROCODONE BITARTRATE AND ACETAMINOPHEN 1 TABLET: 10; 325 TABLET ORAL at 09:27

## 2025-07-30 RX ADMIN — GABAPENTIN 100 MG: 100 CAPSULE ORAL at 20:35

## 2025-07-30 RX ADMIN — ASPIRIN 81 MG: 81 TABLET, COATED ORAL at 08:06

## 2025-07-30 RX ADMIN — INSULIN GLARGINE 23 UNITS: 100 INJECTION, SOLUTION SUBCUTANEOUS at 20:35

## 2025-07-30 RX ADMIN — DOCUSATE SODIUM 50 MG AND SENNOSIDES 8.6 MG 2 TABLET: 8.6; 5 TABLET, FILM COATED ORAL at 20:35

## 2025-07-30 RX ADMIN — HEPARIN SODIUM 5000 UNITS: 5000 INJECTION INTRAVENOUS; SUBCUTANEOUS at 18:22

## 2025-07-30 RX ADMIN — TIZANIDINE 4 MG: 4 TABLET ORAL at 20:39

## 2025-07-30 RX ADMIN — Medication 1 APPLICATION: at 05:29

## 2025-07-30 RX ADMIN — DOCUSATE SODIUM 50 MG AND SENNOSIDES 8.6 MG 2 TABLET: 8.6; 5 TABLET, FILM COATED ORAL at 08:06

## 2025-07-30 RX ADMIN — CHLORHEXIDINE GLUCONATE 1 APPLICATION: 500 CLOTH TOPICAL at 05:29

## 2025-07-30 RX ADMIN — Medication 20000 UNITS: at 08:05

## 2025-07-30 RX ADMIN — CEFAZOLIN 2 G: 2 INJECTION, POWDER, FOR SOLUTION INTRAMUSCULAR; INTRAVENOUS at 16:16

## 2025-07-30 RX ADMIN — HYDROCODONE BITARTRATE AND ACETAMINOPHEN 1 TABLET: 10; 325 TABLET ORAL at 18:26

## 2025-07-30 RX ADMIN — OXYCODONE HYDROCHLORIDE AND ACETAMINOPHEN 1000 MG: 500 TABLET ORAL at 08:06

## 2025-07-30 RX ADMIN — Medication 220 MG: at 08:06

## 2025-07-30 RX ADMIN — LOSARTAN POTASSIUM 25 MG: 25 TABLET, FILM COATED ORAL at 08:06

## 2025-07-30 RX ADMIN — POLYETHYLENE GLYCOL 3350 17 G: 17 POWDER, FOR SOLUTION ORAL at 08:07

## 2025-07-30 NOTE — CASE MANAGEMENT/SOCIAL WORK
Discharge Planning Assessment  [facility]     Patient Name: Porfirio Gates  MRN: [id number]  Today's Date: 7/30/2025    Admit Date: 7/29/2025        Discharge Needs Assessment       Row Name 07/30/25 0829       Living Environment    People in Home alone    Current Living Arrangements home    Potentially Unsafe Housing Conditions none    In the past 12 months has the electric, gas, oil, or water company threatened to shut off services in your home? No    Primary Care Provided by self    Provides Primary Care For no one    Family Caregiver if Needed child(anita), adult    Family Caregiver Names Charity Gutierres  [phone]    Quality of Family Relationships helpful;involved;supportive    Able to Return to Prior Arrangements yes       Resource/Environmental Concerns    Resource/Environmental Concerns none    Transportation Concerns none       Transportation Needs    In the past 12 months, has lack of transportation kept you from medical appointments or from getting medications? no    In the past 12 months, has lack of transportation kept you from meetings, work, or from getting things needed for daily living? No       Food Insecurity    Within the past 12 months, you worried that your food would run out before you got the money to buy more. Never true    Within the past 12 months, the food you bought just didn't last and you didn't have money to get more. Never true       Transition Planning    Patient/Family Anticipates Transition to home    Patient/Family Anticipated Services at Transition none    Transportation Anticipated family or friend will provide       Discharge Needs Assessment    Equipment Currently Used at Home none    Concerns to be Addressed denies needs/concerns at this time    Do you want help finding or keeping work or a job? I do not need or want help    Do you want help with school or training? For example, starting or completing job training or getting a high school diploma, GED or equivalent No     Equipment Needed After Discharge none    Discharge Coordination/Progress Live alone but has a daughter that is involved and attentive. No DME. PCP is Dr. Jenaro Graff. No immediate needs. Probable will move out to floor today. CM/SS will assist if needed with any discharge needs or planning.                   Discharge Plan    No documentation.                 Continued Care and Services - Admitted Since 7/29/2025    No active coordination exists.          Demographic Summary    No documentation.                  Functional Status    No documentation.                  Psychosocial    No documentation.                  Abuse/Neglect    No documentation.                  Legal    No documentation.                  Substance Abuse    No documentation.                  Patient Forms    No documentation.                     Arely Gates RN

## 2025-07-30 NOTE — ANESTHESIA POSTPROCEDURE EVALUATION
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        Post Anesthesia Care and Evaluation    PONV Status: none  Comments: Patient d/c from PACU prior to anes eval based on Tori score.  Please see RN notes for details of d/c criteria.    Blood pressure 149/88, pulse 101, temperature 99.4 °F (37.4 °C), temperature source Axillary, resp. rate 19, height 175 cm (68.9\"), weight 101 kg (223 lb 5.2 oz), SpO2 98%.        "

## 2025-07-30 NOTE — PLAN OF CARE
Goal Outcome Evaluation:      Pt AXOX4 but requiring frequent redirection. Pt having issues with pain control most of the night despite PCA pump and norcos. Pt restless to the point of disloging arterial line. [place] removed this AM. Plan of care continues.

## 2025-07-30 NOTE — PLAN OF CARE
Problem: Adult Inpatient Plan of Care  Goal: Plan of Care Review  7/30/2025 0615 by Angie Rubio RN  Outcome: Progressing  7/30/2025 0614 by Angie Rubio RN  Outcome: Progressing  7/30/2025 0610 by Angie Rubio RN  Outcome: Progressing  Goal: Patient-Specific Goal (Individualized)  7/30/2025 0615 by Angie Rubio RN  Outcome: Progressing  7/30/2025 0614 by Angie Rubio RN  Outcome: Progressing  7/30/2025 0610 by Angie Rubio RN  Outcome: Progressing  Goal: Absence of Hospital-Acquired Illness or Injury  7/30/2025 0615 by Angie Rubio RN  Outcome: Progressing  7/30/2025 0614 by Angie Rubio RN  Outcome: Progressing  7/30/2025 0610 by Angie Rubio RN  Outcome: Progressing  Intervention: Identify and Manage Fall Risk  Recent Flowsheet Documentation  Taken 7/30/2025 0600 by Angie Rubio RN  Safety Promotion/Fall Prevention:   safety round/check completed   fall prevention program maintained  Taken 7/30/2025 0500 by Angie Rubio RN  Safety Promotion/Fall Prevention:   safety round/check completed   fall prevention program maintained  Taken 7/30/2025 0400 by Angie Rubio RN  Safety Promotion/Fall Prevention:   safety round/check completed   fall prevention program maintained  Taken 7/30/2025 0300 by Angie Rubio RN  Safety Promotion/Fall Prevention:   fall prevention program maintained   safety round/check completed  Taken 7/30/2025 0200 by Angie Rubio RN  Safety Promotion/Fall Prevention:   safety round/check completed   fall prevention program maintained  Taken 7/30/2025 0100 by Angie Rubio RN  Safety Promotion/Fall Prevention:   safety round/check completed   fall prevention program maintained  Taken 7/30/2025 0000 by Angie Rubio RN  Safety Promotion/Fall Prevention:   safety round/check completed   fall prevention program maintained  Taken 7/29/2025 2300 by Angie Rubio RN  Safety Promotion/Fall Prevention:   safety round/check completed   fall prevention program maintained  Taken  7/29/2025 2100 by Angie Rubio RN  Safety Promotion/Fall Prevention:   safety round/check completed   fall prevention program maintained  Taken 7/29/2025 2009 by Angie Rubio RN  Safety Promotion/Fall Prevention:   safety round/check completed   fall prevention program maintained  Intervention: Prevent Skin Injury  Recent Flowsheet Documentation  Taken 7/30/2025 0600 by Angie Rubio RN  Body Position:   position changed independently   supine  Taken 7/30/2025 0500 by Angie Rubio RN  Body Position:   position changed independently   supine  Taken 7/30/2025 0400 by Angie Rubio RN  Body Position:   supine   left   position changed independently  Taken 7/30/2025 0300 by Angie Rubio RN  Body Position:   supine   left   position changed independently  Taken 7/30/2025 0200 by Angie Rubio RN  Body Position:   supine   position changed independently  Taken 7/30/2025 0100 by Angie Rubio RN  Body Position: position changed independently  Taken 7/30/2025 0000 by Angie Rubio RN  Body Position: position changed independently  Taken 7/29/2025 2300 by Angie Rubio RN  Body Position:   position changed independently   left  Taken 7/29/2025 2100 by Angie Rubio RN  Body Position:   supine   position changed independently  Taken 7/29/2025 2009 by Angie Rubio RN  Body Position: supine  Skin Protection:   silicone foam dressing in place   incontinence pads utilized  Intervention: Prevent Infection  Recent Flowsheet Documentation  Taken 7/29/2025 2009 by Angie Rubio RN  Infection Prevention:   single patient room provided   visitors restricted/screened  Goal: Optimal Comfort and Wellbeing  7/30/2025 0615 by Angie Rubio RN  Outcome: Progressing  7/30/2025 0614 by Angie Rubio RN  Outcome: Progressing  7/30/2025 0610 by Angie Rubio RN  Outcome: Progressing  Intervention: Provide Person-Centered Care  Recent Flowsheet Documentation  Taken 7/30/2025 0400 by Angie Rubio RN  Trust  Relationship/Rapport:   care explained   thoughts/feelings acknowledged   reassurance provided  Taken 7/30/2025 0000 by Angie Rubio RN  Trust Relationship/Rapport:   care explained   thoughts/feelings acknowledged   choices provided   emotional support provided  Taken 7/29/2025 2009 by Angie Rubio RN  Trust Relationship/Rapport:   care explained   emotional support provided   reassurance provided   thoughts/feelings acknowledged  Goal: Readiness for Transition of Care  7/30/2025 0615 by Angie Rubio RN  Outcome: Progressing  7/30/2025 0614 by Angie Rubio RN  Outcome: Progressing  7/30/2025 0610 by Angie Rubio RN  Outcome: Progressing  Intervention: Mutually Develop Transition Plan  Recent Flowsheet Documentation  Taken 7/30/2025 0126 by Angie Rubio RN  Transportation Anticipated: family or friend will provide  Patient/Family Anticipated Services at Transition: none  Patient/Family Anticipates Transition to: home     Problem: Skin Injury Risk Increased  Goal: Skin Health and Integrity  7/30/2025 0615 by Angie Rubio RN  Outcome: Progressing  7/30/2025 0614 by Angie Rubio RN  Outcome: Progressing  7/30/2025 0610 by Angie Rubio RN  Outcome: Progressing  Intervention: Optimize Skin Protection  Recent Flowsheet Documentation  Taken 7/30/2025 0600 by Angie Rubio RN  Activity Management: bedrest  Head of Bed (HOB) Positioning: HOB at 20-30 degrees  Taken 7/30/2025 0500 by Angie Rubio RN  Activity Management: bedrest  Head of Bed (HOB) Positioning: HOB at 30 degrees  Taken 7/30/2025 0400 by Angie Rubio RN  Activity Management: bedrest  Head of Bed (HOB) Positioning: HOB at 30 degrees  Taken 7/30/2025 0300 by Angie Rubio RN  Activity Management: bedrest  Head of Bed (HOB) Positioning: HOB at 30 degrees  Taken 7/30/2025 0200 by Angie Rubio RN  Activity Management: bedrest  Head of Bed (HOB) Positioning: HOB at 20-30 degrees  Taken 7/30/2025 0100 by Angie Rubio RN  Activity  Management: bedrest  Head of Bed (HOB) Positioning: HOB at 30 degrees  Taken 7/30/2025 0000 by Angie Rubio RN  Activity Management: bedrest  Head of Bed (HOB) Positioning: HOB at 30 degrees  Taken 7/29/2025 2300 by Angie Rubio RN  Activity Management: bedrest  Head of Bed (HOB) Positioning: HOB at 30 degrees  Taken 7/29/2025 2100 by Angie Rubio RN  Activity Management: bedrest  Head of Bed (HOB) Positioning: HOB at 30-45 degrees  Taken 7/29/2025 2009 by Angie Rubio RN  Activity Management: bedrest  Pressure Reduction Techniques:   weight shift assistance provided   pressure points protected  Head of Bed (HOB) Positioning: HOB at 30-45 degrees  Pressure Reduction Devices:   pressure-redistributing mattress utilized   foam padding utilized  Skin Protection:   silicone foam dressing in place   incontinence pads utilized     Problem: Comorbidity Management  Goal: Blood Glucose Level Within Target Range  7/30/2025 0615 by Angie Rubio RN  Outcome: Progressing  7/30/2025 0614 by Angie Rubio RN  Outcome: Progressing  7/30/2025 0610 by Angie Rubio RN  Outcome: Progressing  Intervention: Monitor and Manage Glycemia  Recent Flowsheet Documentation  Taken 7/30/2025 0000 by Angie Rubio RN  Medication Review/Management: medications reviewed  Taken 7/29/2025 2009 by Angie Rubio RN  Medication Review/Management: medications reviewed   Goal Outcome Evaluation:

## 2025-07-30 NOTE — PAYOR COMM NOTE
"REF: [id number]     [facility]  [place],   [phone]  OR  FAX   [phone]      Porfirio Gates \"Jorje\" (69 y.o. Male)       Date of Birth   1955    Social Security Number       Address   [address] [place] [address]    Home Phone   [phone]    MRN   [id number]       Orthodoxy   [facility]    Marital Status   Single                            Admission Date   7/29/2025    Admission Type   Elective    Admitting Provider   Endy Marte MD    Attending Provider   Endy Marte MD    Department, Room/Bed   [facility] INTENSIVE CARE, I003/1       Discharge Date       Discharge Disposition       Discharge Destination                                 Attending Provider: Endy Marte MD    Allergies: No Known Allergies    Isolation: None   Infection: None   Code Status: CPR    Ht: 175 cm (68.9\")   Wt: 101 kg (223 lb 5.2 oz)    Admission Cmt: None   Principal Problem: 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 [C79.51,C80.1]                   Active Insurance as of 7/29/2025       Primary Coverage       Payor Plan Insurance Group Employer/Plan Group    HUMANA MEDICARE REPLACEMENT HUMANA MEDICARE ADVANTAGE PPO [id number]       Payor Plan Address Payor Plan Phone Number Payor Plan Fax Number Effective Dates    [address] [address] [phone]  7/1/2024 - None Entered    [facility] [id number]         Subscriber Name Subscriber Birth Date Member ID       PORFIRIO GATES 1955 [id number]                     Emergency Contacts        (Rel.) Home Phone Work Phone Mobile Phone    Tiffanie Joiner (Sister) -- -- [phone]    héctor savage (Daughter) [phone] -- --          Angie Rubio RN   Registered Nurse  Cardiology     Plan of Care     Signed     Date of Service: 07/30/25 0610  Creation Time: 07/30/25 0610     Signed         Goal Outcome Evaluation:   Pt AXOX4 but requiring frequent redirection. Pt " having issues with pain control most of the night despite PCA pump and norcos. Pt restless to the point of disloging arterial line. Middleton removed this AM. Plan of care continues.                            History & Physical        Endy Marte MD at 07/29/25 1057          H&P reviewed.  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 Patient had MRI of lumbar,MRI of thoracic spine on 07/2025 at [facility]. Patient had CT of cervical spine on 07/10/2025 at [facility].       History of Present Illness    Oncology/Hematology History    No history exists.         History of Present Illness  The patient presents for evaluation of back pain. He is accompanied by his daughter.    He was referred to our clinic by Dr. Saul due to a spot on his lung, which was discovered during a CT scan for his back pain. The scan was initially ordered to investigate potential kidney stones. A subsequent scan was performed last week under the order of Dr. Graff. His primary concern, however, is the presence of spots on his spine. He reports no significant weight changes. He has a history of ruptured discs in his neck and lower back, but he has not sought surgical intervention.    The back pain is predominantly localized to the right side and does not radiate to the groin or thigh. He recalls an instance where the pain extended around his abdomen. He reports no leg weakness, falls, numbness, or tingling. He experiences constipation and difficulty urinating in the mornings. His PSA levels were elevated, prompting a  referral to a urologist. The back pain intensifies when he remains standing for extended periods.    He is diabetic and administers Mounjaro injections weekly but does not use insulin.    Social History:    Occupations: Construction    Tobacco: He smokes cigarettes, currently about two cigarettes a day.    FAMILY HISTORY  His brother had lung cancer.      The patient presents for a follow-up of suspected neoplasm cancer to the spine, accompanied by his daughter.    He reports intermittent back pain, which is currently absent. The pain typically manifests after prolonged periods of standing and is primarily localized to the right side of his back, occasionally radiating to the side. He does not experience any exacerbation of pain with bending over and can manage the discomfort by finding a suitable sitting or lying position. He has not undergone a biopsy yet. He recalls a previous discussion about the potential need for metal pins and the inability to use radiation due to its proximity to the spine. He is concerned about the possibility of cancer and the necessity of surgery.    Additionally, he mentions that his head has been tilted to one side for approximately 6 months. He reports no bowel or bladder issues, including incontinence. He also reports no numbness or tingling in his legs. He has not experienced any recent falls.    He has resumed his Ozempic injections for diabetes management, which he had previously discontinued after losing 40 pounds.    Social History:    Occupations: Construction    FAMILY HISTORY  His mother had a hysterectomy and underwent radiation treatments in her 50s.    Alternative therapies include ibuprofen for at least 3 months without significant relief.  He has avoided all narcotics.    ECOG: (0) Fully Active - Able to Carry On All Pre-disease Performance Without Restriction  KPS: 100: Normal; no evidence of disease      Review of Systems   Constitutional: Negative.    HENT: Negative.      Eyes: Negative.    Respiratory: Negative.     Cardiovascular: Negative.    Gastrointestinal: Negative.    Endocrine: Negative.    Genitourinary: Negative.    Musculoskeletal:  Positive for back pain.   Skin: Negative.    Allergic/Immunologic: Negative.    Neurological: Negative.    Hematological: Negative.    Psychiatric/Behavioral: Negative.         Past Medical History:   Diagnosis Date    Diabetes type 2     Hypertension     Kidney stones        Past Surgical History:   Procedure Laterality Date    ADENOIDECTOMY      CARDIAC CATHETERIZATION N/A 9/6/2023    Procedure: Left Heart Cath;  Surgeon: Melvin Peterson MD;  Location:  [facility] CATH INVASIVE LOCATION;  Service: Cardiology;  Laterality: N/A;    CHOLECYSTECTOMY      CYSTOSCOPY URETEROSCOPY LASER LITHOTRIPSY Left 3/10/2017    Procedure: CYSTOSCOPY URETEROSCOPY LASER LITHOTRIPSY;  Surgeon: Neftali Pereira MD;  Location:  [facility] OR;  Service:     CYSTOSCOPY W/ URETERAL STENT PLACEMENT Left 3/10/2017    Procedure: CYSTOSCOPY URETERAL CATHETER/STENT INSERTION LEFT URETER;  Surgeon: Neftali Pereira MD;  Location: [facility] OR;  Service:        Family History: family history includes Cancer in his brother; Colon cancer in his father; Diabetes in his father; Heart attack in his father; No Known Problems in his mother.    Social History:  reports that he has been smoking cigarettes. He has been exposed to tobacco smoke. He has quit using smokeless tobacco. He reports that he does not drink alcohol and does not use drugs.    Medications:    Current Outpatient Medications:     aspirin 81 MG EC tablet, Take 1 tablet by mouth Daily., Disp: , Rfl:     atorvastatin (LIPITOR) 40 MG tablet, Take 1 tablet by mouth Every Night., Disp: 90 tablet, Rfl: 2    clotrimazole-betamethasone (LOTRISONE) 1-0.05 % cream, Apply 1 Application topically to the appropriate area as directed 2 (Two) Times a Day., Disp: 45 g, Rfl: 0    ibuprofen (ADVIL,MOTRIN) 200 MG tablet, Take 1 tablet by mouth  Every 6 (Six) Hours As Needed for Mild Pain., Disp: , Rfl:     losartan (COZAAR) 25 MG tablet, TAKE 1 TABLET BY MOUTH EVERY DAY, Disp: 30 tablet, Rfl: 2    metFORMIN ER (GLUCOPHAGE-XR) 500 MG 24 hr tablet, TAKE 1 TABLET BY MOUTH EVERY DAY WITH BREAKFAST, Disp: 30 tablet, Rfl: 2    Semaglutide,0.25 or 0.5MG/DOS, (Ozempic, 0.25 or 0.5 MG/DOSE,) 2 MG/3ML solution pen-injector, Inject 0.25 mg under the skin into the appropriate area as directed 1 (One) Time Per Week for 28 days, THEN 0.5 mg 1 (One) Time Per Week for 28 days., Disp: 3 mL, Rfl: 0    Chlorhexidine Gluconate 4 % solution, Shower each day with solution for 5 days beginning 5 days before surgery., Disp: 120 mL, Rfl: 0    Allergies:  Patient has no known allergies.    Objective  Physical Exam  Eyes:      General: Lids are normal.      Extraocular Movements: Extraocular movements intact.      Pupils: Pupils are equal, round, and reactive to light.   Neurological:      Coordination: Coordination is intact.      Deep Tendon Reflexes:      Reflex Scores:       Tricep reflexes are 2+ on the right side and 2+ on the left side.       Bicep reflexes are 2+ on the right side and 2+ on the left side.       Brachioradialis reflexes are 2+ on the right side and 2+ on the left side.       Patellar reflexes are 3+ on the right side and 3+ on the left side.       Achilles reflexes are 0 on the right side and 0 on the left side.  Psychiatric:         Speech: Speech normal.       Neurological Exam  Mental Status  Awake, alert and oriented to person, place and time. Speech is normal. Language is fluent with no aphasia. Attention and concentration are normal.    Cranial Nerves  CN II: Visual acuity is normal.  CN III, IV, VI: Extraocular movements intact bilaterally. Normal lids and orbits bilaterally. Pupils equal round and reactive to light bilaterally.  CN V: Facial sensation is normal.  CN VII: Full and symmetric facial movement.  CN IX, X: Palate elevates symmetrically  CN  XI: Shoulder shrug strength is normal.    Motor  Normal muscle bulk throughout. Increased muscle tone. BLE.                                               Right                     Left  Toe extension                        5                          5                                             Right                     Left  Deltoid                                   5                          5   Biceps                                   5                          5   Triceps                                  5                          5   Wrist extensor                       5                          5   Finger flexor                          5                          5   Iliopsoas                               5                          5   Quadriceps                           5                          5   Gastrocnemius                     5                           5   Anterior tibialis                      5                          5    Sensory  Light touch is normal in upper and lower extremities.     Reflexes                                            Right                      Left  Brachioradialis                    2+                         2+  Biceps                                 2+                         2+  Triceps                                2+                         2+  Patellar                                3+                         3+  Achilles                                0                         0  Right Plantar: upgoing  Left Plantar: upgoing    Right pathological reflexes: Shanique's absent. Ankle clonus absent.  Left pathological reflexes: Shanique's absent. Ankle clonus absent.    Coordination    Finger-to-nose, rapid alternating movements and heel-to-shin normal bilaterally without dysmetria.    Gait  Casual gait is normal including stance, stride, and arm swing.  Marked progressive cervical kyphosis..    Male  strength (pounds)  AGE Right Hand RH Norms Left Hand LH Norms    20-24  121+20.6  104+21.8   25-29  120+23.0  110+16.2   30-34  121+22.4  110+21.7   35-39  119+24  113+21.7   40-44  117+20.7  112+18.7   45-49  110+23.0  101+22.8   50-54  113+18.1  102+17   55-59  101+26.7  83+23.4   60-64  90+20.4  77+20.3   65-69 105 91+20.6 95 76.8+19.8   70-74  75+21.5  65+18.1   75+  66+21.0  55+17.0   (AKI Tapia et al; Hand Dynometer: Effects of trials and sessions.  Perpetual and Motor Skills 61:195-8, 1985)  Key  * = Dominant hand  > = Intervention        Imaging: (independent review and interpretation)  MRI Lumbar Spine With & Without Contrast  Result Date: 7/10/2025  1. T2 hyperintense lesion posteriorly on the right at L1 with bowing of the posterior cortex causing moderate central canal stenosis and narrows the right neural foramen. 2. Similar-appearing T2 hyperintense lesions at the right posterior superior endplate of L2 and right lamina T11.   This report was signed and finalized on 7/10/2025 6:58 PM by Oleksandr Gama.      CT Cervical Spine Without Contrast  Result Date: 7/1/2025  1. Greatest spinal canal stenosis appears mild at C5-6 and C6-7. 2. Multilevel severe foraminal stenoses, predominantly RIGHT more than LEFT, as described in detail above. Other level by level findings as above. 3. No evidence of bony metastatic disease by CT.  This report was signed and finalized on 7/1/2025 11:02 AM by Dr Claire Cornelius MD.      MRI Cervical Spine With & Without Contrast  Result Date: 7/1/2025  1. No osseous metastatic disease identified in the cervical spine. 2. Multilevel cervical spine osteoarthritis change, as detailed above. No high-grade spinal stenosis. No compressive myelopathy or myelomalacia.    This report was signed and finalized on 7/1/2025 10:52 AM by Dr Adriano Carroll.      MRI Thoracic Spine With & Without Contrast  Result Date: 7/1/2025  Impression: 1. Osseous metastatic disease at the T11, L1 and L2 vertebral levels. 2. Known metastatic lesion at L1 (from recent  chest CT) involves the vertebral body and right-sided posterior elements and there is extraosseous soft tissue mass within the spinal canal. This results in a moderate spinal stenosis and there is also a pathologic fracturing with minimal loss of height along the inferior endplate. 3. T11 lesion is centered in the posterior elements right of midline and there is some associated extraosseous soft tissue mass although there is no actual soft tissue mass within the spinal canal at this level.  This report was signed and finalized on 7/1/2025 10:38 AM by Dr Adriano Carroll.      CT Chest Without Contrast Diagnostic  Result Date: 6/17/2025  1. Solid irregular mass at the right upper lobe consistent with neoplasm. 2. Osseous metastasis with lytic destructive lesion on the right posteriorly at L1 with epidural extension of tumor causing at least moderate central canal stenosis. Clinical correlation is recommended and MRI could be performed for further evaluation. There is also lytic destructive lesion involving the right posterior elements of T11 and proximal left clavicle.  This report was signed and finalized on 6/17/2025 12:04 PM by Oleksandr Gama.      Results  Imaging   - CT scan of the neck: 2023, T   - CT scan of the spine: 2023, Lesion at L1 causing compression on the spinal cord, reducing its diameter from 17 mm to 7 mm. A lytic lesion is also present at T11.      Imaging   - CT scan of the chest: Two areas of concern.   - MRI of the thoracic spine with and without contrast: Three areas of concern at T11, L1, and L2. The lesion at T11 is not causing pressure on the spinal cord. The lesion at L1 is starting to cause a problem. The part at L2 is just a small blip and not much of a concern.   - MRI of the cervical spine: No evidence of cancer or osseous metastatic lesions. Indicates a healed fracture at the right C2, C3 facet and degenerative disk disease.                        ASSESSMENT and PLAN  Porfirio Recinos  Kody is a 69 y.o. male with a significant comorbidity of known lung mass, hypertension, type 2 diabetes, class I obesity, pulmonary emphysema. He presents with a new problem of 7 out of 10 back pain. Physical exam findings of bilateral Babinski and hyperreflexia in his lower extremities with increased tone in his lower extremities as well as marked cervical kyphosis.  His imaging shows lytic lesions to L1 and T11 with L1 suggesting canal compromise and violation of the posterior wall of the spinal column on noncontrast CT.    Spinal Metastasis  DDX: Spinal metastasis most likely, primary spinal column neoplasm, less likely osteoporotic fracture, infection, atypical hemangioma.    70% of Spinal metastases are located within the thoracic spine, 20% in lumbar and 10% cervical with 15% showing epidural compression.    Goals of Care  Palliative (pain reduction)  Improve QOL (ambulation, survival)  Oncological Cure  Stabilization  Diagnosis    The evaluation of patients with spinal metastasis are complex and must be multidisciplinary and the decision to perform surgery must be based on four key aspects of the patient's status: Medical fitness, clinical presentation, oncological status, and feasibility of surgical treatment.    Medical fitness  Porfirio has the following patient factors that have been found to be related to poor surgical outcome: None.  Additionally the patient has an ECOG of 0 and a KPS of 90.  Based on this Porfiiro is a good surgical candidate.    Clinical Presentation  Patients with spinal metastasis typically present with a combination of neurological symptoms, pain, or signs of medical instability.    Porfirio's neurological function is intact but with mechanical back pain.  Based on T2 sagittal imaging the patient's epidural spinal cord compression (ESCC) based on the Spinal Oncology Study Group (SOSG) grading scale is Grade 2 = spinal cord compression but cerebrospinal fluid is visible.        83 to 95% of  patients typically present with pain and this typically precedes neurological symptomology.  There are 3 types of pain that affect patients with metastatic disease.  This includes local, radicular, and mechanical.  Lincoln presents with mechanical pain.     Spinal Instability Neoplastic Score (SINS)  Location 3 = Junctional; O-C2; C7-T2; T11-L1; L5-S1   Pain 3 = Mechanical   Bone Lesion 2 = Lytic   Radiographic Spinal Alignment 0 = Normal   Vertebral Body Collapse 1 = No collapse with only 50% of vertebral body involved   Posterior Spinal Element Involvement 1 = Unilateral   Total = 10   Interpretation: SINS 7-12 = Potentially Unstable    Oncological Status  Revised Tokuhashi Score (SPINE 2005)  General Condition 2 = KPS >70   # Extraspinal bone metastases 2 = 1   # Mets to vertebral body 1 = 2 metastases   # Metastases to major internal organs 1 = removal   Primary 0 = Lung, osteosarcoma, GI, bladder, esophalgus, pancreas   Neurological function 2 = Intact (Frankel E)   Total = 8   Tokuhashi Score 0-8.  Recommend Conservative management. Expected survival <6 months    Currently Porfirio could benefit from Tissue diagnosis and stabilization     Treatment options   (Spinal Oncology Group Recommendations SPINE Vol 34, 22S, 2009)    Steroids - No role currently    Bisphosphonates - Inhibits osteoclasts and may have a direct inhibition on tumor cells.  Reduced risk of pathological fracture (OR 0.6-0.9),hypercalcemia (OR 0.2-0.7), and need for radiation or surgery (OR 0.67-1.0).  But does not change mortality. [employer] 2009.    Radiation - Favorable tumors include Lymphoma, Seminoma, Myeloma.  Intermediate tumors include breast and prostate. Unfavorable NSCLC, Renal, Melanoma, GI and sarcoma.  IMRT, 16Gy in one dose.  85% improvement in pain, 42-90% improvement in neurological function.  0.5% of post radiation myelopathy. SOG recommends to reduce pain and maintain ambulation in patients with sensitive  disease without instability.    Embolization - Usually only indicated in renal cell or thyroid carcinoma as a preoperative intervention to reduce surgical blood loss by 60%.    Kyphoplasty with OsteoCool - Provides biopsy, pain control, stabilization, and palliative treatment in poor surgical candidates.  Contraindicated in patients with compression lesions.  In a study of 204 patients there was a 0.5% incidence of medical complications, 0% incidence of neurological compromise, 12.1% chance of extravasation, 0% chance of painful extravasation, and a 2.9% chance of adjacent vertebral fracture. In a separate study of 143 vertebrae (89 thoracic spine, 53 lumbar spine, and 1 sacral spine) local control rates of 97.1, 95.9, and 94.2% at 6, 12, and 24 months with median follow-up was 14.5 months (range 0.4-109). Local progression occurred in 10 patients (7%) after a median time of 22.3 months. Radiat Oncol. 2020 Nov 12;15(1):263. SOG recommends in patients with painful compression fractures secondary to metastatic disease (moderate quality evidence).    Surgical decompression with or without stabilization. Indications for surgery include diagnosis, stability, neurological decline with loss of ambulation less than 48 hrs, or epidural compression. Surgical decompression and radiation improve maintenance of continence (156 vs 17 days), ambulation/LAST score (566 vs 72 days), and survival (126 vs 100 days) over radiation alone in patients with epidural compression without radiosensitive tumor, not paralyzed >48 hours, without brain mets, or <3 months survival.  Jackelineell et al, LANCET 2005, 366:643-48.  SOG recommends surgery in patients with high-grade epidural spinal cord compression (Strong recommendation with moderate quality evidence)    Treatment Algorithm  I tend to utilize to algorithms.  Based on the algorithm for spinal metastasis developed and prospectively applied by [facility] in [facility] since 2002 and the  neurologic, oncological, mechanical, and systemic (NOMS) decision framework utilized over the last 15 years and [facility].          Kristopher MORALES et al. Evaluation of the Metastatic Spine Disease Multidisciplinary Working Group Algorithms as Part of a Multidisciplinary Spine Tumor Conference. Global Spine J. 2020 Oct;10(7):888-895.      Based on these SOG Recommendations Porfirio and GENEVIEVE had an extensive discussion regarding the risk, benefits, and possible complications regarding observation, needle biopsy, Kyphoplasty, OsteoCool, resection and stabilization, SRS, SBRT, and chemotherapy in various combinations.         Assessment & Plan  1. Back Pain   Lytic lesion at L1 and T11 consistent with metastatic disease to the spine.   Suspected neoplasm cancer to the spine.  Back pain is reported, primarily on the right side, worsening with prolonged standing. Imaging studies, including MRI of the thoracic spine, reveal lesions at T11, L1, and L2. The lesion at L1 is causing pressure on the spinal cord with epidural compression, while the T11 lesion is not causing significant issues at this time but the tumor has invaded the right facet. Various treatment options were discussed, including conservative therapy, biopsy, radiation, and combined biopsy and stabilization surgery. Given the proximity of the L1 lesion to the nerve roots and cauda equina, radiation is not recommended at this time. The patient prefers to proceed with a biopsy first. An open T11 biopsy will be scheduled within the week, followed by a T10-L3 instrumented fusion and L1 corpectomy within 2 weeks if the biopsy confirms cancer. Post-surgery, chemotherapy and radiation will be considered based on the biopsy results and the patients relatively good prognosis. Risks of the biopsy include potential damage to the spinal cord, bleeding after surgery, and spreading the cancer. The major surgery will involve removing the lesion, stabilizing  the spine with screws and rods, and placing a titanium cage to maintain spinal stability. Post-surgery recovery will include a hospital stay of approximately one week, followed by inpatient physical therapy if needed. The patient will be advised to avoid bending, lifting, and twisting for 2-4 weeks post-surgery. Chemotherapy and radiation will likely begin 2 weeks after surgery.    per the  IP code list the specific codes that are on the list and that's why you requested IP status for this patient.     2. Cervical kyphosis  MRI of the cervical spine shows no evidence of cancer or osseous metastatic lesions but indicates a healed fracture at the right C2-C3 facet, likely due to past trauma. Degenerative disk disease is present, but no emergent issues with the neck.     Diabetes mellitus.  Ozempic injections have been resumed approximately 2-3 months ago to manage diabetes and aid in weight loss. The patient previously lost about 40 pounds on Ozempic before discontinuing it. Continued use of Ozempic is recommended to manage blood glucose levels and support weight loss efforts. Potential side effects of Ozempic include gastrointestinal symptoms such as nausea, vomiting, and diarrhea. The patient should monitor blood glucose levels regularly and report any significant changes or adverse effects. Regular follow-up appointments will be necessary to assess the effectiveness of the treatment and make any necessary adjustments.          Diagnoses and all orders for this visit:    1. 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)  -     Case Request; Standing  -     CBC (No Diff); Future  -     Comprehensive Metabolic Panel; Future  -     MRSA Screen Culture (Outpatient) - Swab, Nares; Future  -     Type & Screen; Future  -     ECG 12 Lead; Future  -     XR Chest 1 View; Future  -     chlorhexidine (PERIDEX) 0.12 % solution 15 mL  -     ceFAZolin (ANCEF) 2 g in sodium chloride 0.9 % 100 mL  IVPB  -     Case Request  -     Case Request; Standing  -     CBC & Differential; Future  -     Comprehensive Metabolic Panel; Future  -     Urinalysis without microscopic (no culture) - Urine, Clean Catch; Future  -     Vitamin D 25 Hydroxy; Future  -     MRSA Screen Culture (Outpatient) - Swab, Nares; Future  -     Type & Screen; Future  -     ECG 12 Lead; Future  -     XR Chest 1 View; Future  -     ceFAZolin (ANCEF) 2 g in sodium chloride 0.9 % 100 mL IVPB  -     Case Request    2. Tobacco abuse    3. Other secondary kyphosis, cervical region    4. 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  -     Vitamin D 25 Hydroxy; Future    Other orders  -     Outpatient In A Bed; Standing  -     Follow Anesthesia Guidelines / Protocol; Future  -     Follow Anesthesia Guidelines / Protocol; Standing  -     Verify NPO Status; Standing  -     SCD (Sequential Compression Device) - Place on Patient in Pre-Op; Standing  -     Have Patient Void Prior to Procedure; Standing  -     Verify / Perform Chlorhexidine Skin Prep; Standing  -     Provide NPO Instructions to Patient; Future  -     Chlorhexidine Skin Prep; Future  -     Provide Patient with Enhanced Recovery Booklet(s) or Handout; Future  -     Inpatient Admission; Standing  -     Follow Anesthesia Guidelines / Protocol; Future  -     Follow Anesthesia Guidelines / Protocol; Standing  -     Verify NPO Status; Standing  -     SCD (Sequential Compression Device) - Place on Patient in Pre-Op; Standing  -     Verify / Perform Chlorhexidine Skin Prep; Standing  -     Insert Indwelling Urinary Catheter; Standing  -     Assess Need for Indwelling Urinary Catheter - Follow Removal Protocol; Standing  -     Urinary Catheter Care; Standing  -     Provide NPO Instructions to Patient; Future  -     Chlorhexidine Skin Prep; Future  -     Provide Patient with Enhanced Recovery Booklet(s) or Handout; Future  -     Prepare RBC, 2 Units; Standing  -     Chlorhexidine Gluconate 4 % solution;  Shower each day with solution for 5 days beginning 5 days before surgery.  Dispense: 120 mL; Refill: 0          Return for POSTOPERATIVELY.    Thank you for this Consultation and the opportunity to participate in Porfirio's care.    Sincerely,  Endy Marte MD    I spent 61 minutes caring for Porfirio on this date of service. This time includes time spent by me in the following activities: preparing for the visit, reviewing tests, obtaining and/or reviewing a separately obtained history, performing a medically appropriate examination and/or evaluation, counseling and educating the patient/family/caregiver, ordering medications, tests, or procedures, referring and communicating with other health care professionals, documenting information in the medical record, independently interpreting results and communicating that information with the patient/family/caregiver, and/or care coordination.         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 Patient had MRI of lumbar,MRI of thoracic spine on 07/2025 at [facility]. Patient had CT of cervical spine on 07/10/2025 at [facility].       History of Present Illness    Oncology/Hematology History    No history exists.         History of Present Illness  The patient presents for evaluation of back pain. He is accompanied by his daughter.    He was referred to our clinic by Dr. Saul due to a spot on his lung, which was discovered during a CT scan for his back pain. The scan was initially ordered to investigate potential kidney stones. A subsequent scan was performed last week under the  carlos of Dr. Graff. His primary concern, however, is the presence of spots on his spine. He reports no significant weight changes. He has a history of ruptured discs in his neck and lower back, but he has not sought surgical intervention.    The back pain is predominantly localized to the right side and does not radiate to the groin or thigh. He recalls an instance where the pain extended around his abdomen. He reports no leg weakness, falls, numbness, or tingling. He experiences constipation and difficulty urinating in the mornings. His PSA levels were elevated, prompting a referral to a urologist. The back pain intensifies when he remains standing for extended periods.    He is diabetic and administers Mounjaro injections weekly but does not use insulin.    Social History:    Occupations: Construction    Tobacco: He smokes cigarettes, currently about two cigarettes a day.    FAMILY HISTORY  His brother had lung cancer.      The patient presents for a follow-up of suspected neoplasm cancer to the spine, accompanied by his daughter.    He reports intermittent back pain, which is currently absent. The pain typically manifests after prolonged periods of standing and is primarily localized to the right side of his back, occasionally radiating to the side. He does not experience any exacerbation of pain with bending over and can manage the discomfort by finding a suitable sitting or lying position. He has not undergone a biopsy yet. He recalls a previous discussion about the potential need for metal pins and the inability to use radiation due to its proximity to the spine. He is concerned about the possibility of cancer and the necessity of surgery.    Additionally, he mentions that his head has been tilted to one side for approximately 6 months. He reports no bowel or bladder issues, including incontinence. He also reports no numbness or tingling in his legs. He has not experienced any recent falls.    He has resumed  his Ozempic injections for diabetes management, which he had previously discontinued after losing 40 pounds.    Social History:    Occupations: Construction    FAMILY HISTORY  His mother had a hysterectomy and underwent radiation treatments in her 50s.    Alternative therapies include ibuprofen for at least 3 months without significant relief.  He has avoided all narcotics.    ECOG: (0) Fully Active - Able to Carry On All Pre-disease Performance Without Restriction  KPS: 100: Normal; no evidence of disease      Review of Systems   Constitutional: Negative.    HENT: Negative.     Eyes: Negative.    Respiratory: Negative.     Cardiovascular: Negative.    Gastrointestinal: Negative.    Endocrine: Negative.    Genitourinary: Negative.    Musculoskeletal:  Positive for back pain.   Skin: Negative.    Allergic/Immunologic: Negative.    Neurological: Negative.    Hematological: Negative.    Psychiatric/Behavioral: Negative.         Past Medical History:   Diagnosis Date    Diabetes type 2     Hypertension     Kidney stones        Past Surgical History:   Procedure Laterality Date    ADENOIDECTOMY      CARDIAC CATHETERIZATION N/A 9/6/2023    Procedure: Left Heart Cath;  Surgeon: Melvin Peterson MD;  Location: [facility] CATH INVASIVE LOCATION;  Service: Cardiology;  Laterality: N/A;    CHOLECYSTECTOMY      CYSTOSCOPY URETEROSCOPY LASER LITHOTRIPSY Left 3/10/2017    Procedure: CYSTOSCOPY URETEROSCOPY LASER LITHOTRIPSY;  Surgeon: Neftali Pereira MD;  Location: [facility] OR;  Service:     CYSTOSCOPY W/ URETERAL STENT PLACEMENT Left 3/10/2017    Procedure: CYSTOSCOPY URETERAL CATHETER/STENT INSERTION LEFT URETER;  Surgeon: Neftali Pereira MD;  Location: [facility] OR;  Service:        Family History: family history includes Cancer in his brother; Colon cancer in his father; Diabetes in his father; Heart attack in his father; No Known Problems in his mother.    Social History:  reports that he has been smoking cigarettes. He has been  exposed to tobacco smoke. He has quit using smokeless tobacco. He reports that he does not drink alcohol and does not use drugs.    Medications:    Current Outpatient Medications:     aspirin 81 MG EC tablet, Take 1 tablet by mouth Daily., Disp: , Rfl:     atorvastatin (LIPITOR) 40 MG tablet, Take 1 tablet by mouth Every Night., Disp: 90 tablet, Rfl: 2    clotrimazole-betamethasone (LOTRISONE) 1-0.05 % cream, Apply 1 Application topically to the appropriate area as directed 2 (Two) Times a Day., Disp: 45 g, Rfl: 0    ibuprofen (ADVIL,MOTRIN) 200 MG tablet, Take 1 tablet by mouth Every 6 (Six) Hours As Needed for Mild Pain., Disp: , Rfl:     losartan (COZAAR) 25 MG tablet, TAKE 1 TABLET BY MOUTH EVERY DAY, Disp: 30 tablet, Rfl: 2    metFORMIN ER (GLUCOPHAGE-XR) 500 MG 24 hr tablet, TAKE 1 TABLET BY MOUTH EVERY DAY WITH BREAKFAST, Disp: 30 tablet, Rfl: 2    Semaglutide,0.25 or 0.5MG/DOS, (Ozempic, 0.25 or 0.5 MG/DOSE,) 2 MG/3ML solution pen-injector, Inject 0.25 mg under the skin into the appropriate area as directed 1 (One) Time Per Week for 28 days, THEN 0.5 mg 1 (One) Time Per Week for 28 days., Disp: 3 mL, Rfl: 0    Chlorhexidine Gluconate 4 % solution, Shower each day with solution for 5 days beginning 5 days before surgery., Disp: 120 mL, Rfl: 0    Allergies:  Patient has no known allergies.    Objective  Physical Exam  Eyes:      General: Lids are normal.      Extraocular Movements: Extraocular movements intact.      Pupils: Pupils are equal, round, and reactive to light.   Neurological:      Coordination: Coordination is intact.      Deep Tendon Reflexes:      Reflex Scores:       Tricep reflexes are 2+ on the right side and 2+ on the left side.       Bicep reflexes are 2+ on the right side and 2+ on the left side.       Brachioradialis reflexes are 2+ on the right side and 2+ on the left side.       Patellar reflexes are 3+ on the right side and 3+ on the left side.       Achilles reflexes are 0 on the right  side and 0 on the left side.  Psychiatric:         Speech: Speech normal.       Neurological Exam  Mental Status  Awake, alert and oriented to person, place and time. Speech is normal. Language is fluent with no aphasia. Attention and concentration are normal.    Cranial Nerves  CN II: Visual acuity is normal.  CN III, IV, VI: Extraocular movements intact bilaterally. Normal lids and orbits bilaterally. Pupils equal round and reactive to light bilaterally.  CN V: Facial sensation is normal.  CN VII: Full and symmetric facial movement.  CN IX, X: Palate elevates symmetrically  CN XI: Shoulder shrug strength is normal.    Motor  Normal muscle bulk throughout. Increased muscle tone. BLE.                                               Right                     Left  Toe extension                        5                          5                                             Right                     Left  Deltoid                                   5                          5   Biceps                                   5                          5   Triceps                                  5                          5   Wrist extensor                       5                          5   Finger flexor                          5                          5   Iliopsoas                               5                          5   Quadriceps                           5                          5   Gastrocnemius                     5                           5   Anterior tibialis                      5                          5    Sensory  Light touch is normal in upper and lower extremities.     Reflexes                                            Right                      Left  Brachioradialis                    2+                         2+  Biceps                                 2+                         2+  Triceps                                2+                         2+  Patellar                                3+                          3+  Achilles                                0                         0  Right Plantar: upgoing  Left Plantar: upgoing    Right pathological reflexes: Shanique's absent. Ankle clonus absent.  Left pathological reflexes: Shanique's absent. Ankle clonus absent.    Coordination    Finger-to-nose, rapid alternating movements and heel-to-shin normal bilaterally without dysmetria.    Gait  Casual gait is normal including stance, stride, and arm swing.  Marked progressive cervical kyphosis..    Male  strength (pounds)  AGE Right Hand RH Norms Left Hand LH Norms   20-24  121+20.6  104+21.8   25-29  120+23.0  110+16.2   30-34  121+22.4  110+21.7   35-39  119+24  113+21.7   40-44  117+20.7  112+18.7   45-49  110+23.0  101+22.8   50-54  113+18.1  102+17   55-59  101+26.7  83+23.4   60-64  90+20.4  77+20.3   65-69 105 91+20.6 95 76.8+19.8   70-74  75+21.5  65+18.1   75+  66+21.0  55+17.0   (AKI Tapia et al; Hand Dynometer: Effects of trials and sessions.  Perpetual and Motor Skills 61:195-8, 1985)  Key  * = Dominant hand  > = Intervention        Imaging: (independent review and interpretation)  MRI Lumbar Spine With & Without Contrast  Result Date: 7/10/2025  1. T2 hyperintense lesion posteriorly on the right at L1 with bowing of the posterior cortex causing moderate central canal stenosis and narrows the right neural foramen. 2. Similar-appearing T2 hyperintense lesions at the right posterior superior endplate of L2 and right lamina T11.   This report was signed and finalized on 7/10/2025 6:58 PM by Oleksandr Gama.      CT Cervical Spine Without Contrast  Result Date: 7/1/2025  1. Greatest spinal canal stenosis appears mild at C5-6 and C6-7. 2. Multilevel severe foraminal stenoses, predominantly RIGHT more than LEFT, as described in detail above. Other level by level findings as above. 3. No evidence of bony metastatic disease by CT.  This report was signed and finalized on 7/1/2025 11:02 AM by   Claire Cornelius MD.      MRI Cervical Spine With & Without Contrast  Result Date: 7/1/2025  1. No osseous metastatic disease identified in the cervical spine. 2. Multilevel cervical spine osteoarthritis change, as detailed above. No high-grade spinal stenosis. No compressive myelopathy or myelomalacia.    This report was signed and finalized on 7/1/2025 10:52 AM by Dr Adriano Carroll.      MRI Thoracic Spine With & Without Contrast  Result Date: 7/1/2025  Impression: 1. Osseous metastatic disease at the T11, L1 and L2 vertebral levels. 2. Known metastatic lesion at L1 (from recent chest CT) involves the vertebral body and right-sided posterior elements and there is extraosseous soft tissue mass within the spinal canal. This results in a moderate spinal stenosis and there is also a pathologic fracturing with minimal loss of height along the inferior endplate. 3. T11 lesion is centered in the posterior elements right of midline and there is some associated extraosseous soft tissue mass although there is no actual soft tissue mass within the spinal canal at this level.  This report was signed and finalized on 7/1/2025 10:38 AM by Dr Adriano Carroll.      CT Chest Without Contrast Diagnostic  Result Date: 6/17/2025  1. Solid irregular mass at the right upper lobe consistent with neoplasm. 2. Osseous metastasis with lytic destructive lesion on the right posteriorly at L1 with epidural extension of tumor causing at least moderate central canal stenosis. Clinical correlation is recommended and MRI could be performed for further evaluation. There is also lytic destructive lesion involving the right posterior elements of T11 and proximal left clavicle.  This report was signed and finalized on 6/17/2025 12:04 PM by Oleksandr Gama.      Results  Imaging   - CT scan of the neck: 2023, T   - CT scan of the spine: 2023, Lesion at L1 causing compression on the spinal cord, reducing its diameter from 17 mm to 7 mm. A lytic lesion is  also present at T11.      Imaging   - CT scan of the chest: Two areas of concern.   - MRI of the thoracic spine with and without contrast: Three areas of concern at T11, L1, and L2. The lesion at T11 is not causing pressure on the spinal cord. The lesion at L1 is starting to cause a problem. The part at L2 is just a small blip and not much of a concern.   - MRI of the cervical spine: No evidence of cancer or osseous metastatic lesions. Indicates a healed fracture at the right C2, C3 facet and degenerative disk disease.                        ASSESSMENT and PLAN  Porfirio Gates is a 69 y.o. male with a significant comorbidity of known lung mass, hypertension, type 2 diabetes, class I obesity, pulmonary emphysema. He presents with a new problem of 7 out of 10 back pain. Physical exam findings of bilateral Babinski and hyperreflexia in his lower extremities with increased tone in his lower extremities as well as marked cervical kyphosis.  His imaging shows lytic lesions to L1 and T11 with L1 suggesting canal compromise and violation of the posterior wall of the spinal column on noncontrast CT.    Spinal Metastasis  DDX: Spinal metastasis most likely, primary spinal column neoplasm, less likely osteoporotic fracture, infection, atypical hemangioma.    70% of Spinal metastases are located within the thoracic spine, 20% in lumbar and 10% cervical with 15% showing epidural compression.    Goals of Care  Palliative (pain reduction)  Improve QOL (ambulation, survival)  Oncological Cure  Stabilization  Diagnosis    The evaluation of patients with spinal metastasis are complex and must be multidisciplinary and the decision to perform surgery must be based on four key aspects of the patient's status: Medical fitness, clinical presentation, oncological status, and feasibility of surgical treatment.    Medical fitness  Porfirio has the following patient factors that have been found to be related to poor surgical outcome: None.   Additionally the patient has an ECOG of 0 and a KPS of 90.  Based on this Porfirio is a good surgical candidate.    Clinical Presentation  Patients with spinal metastasis typically present with a combination of neurological symptoms, pain, or signs of medical instability.    Porfirio's neurological function is intact but with mechanical back pain.  Based on T2 sagittal imaging the patient's epidural spinal cord compression (ESCC) based on the Spinal Oncology Study Group (SOSG) grading scale is Grade 2 = spinal cord compression but cerebrospinal fluid is visible.        83 to 95% of patients typically present with pain and this typically precedes neurological symptomology.  There are 3 types of pain that affect patients with metastatic disease.  This includes local, radicular, and mechanical.  Porfirio presents with mechanical pain.     Spinal Instability Neoplastic Score (SINS)  Location 3 = Junctional; O-C2; C7-T2; T11-L1; L5-S1   Pain 3 = Mechanical   Bone Lesion 2 = Lytic   Radiographic Spinal Alignment 0 = Normal   Vertebral Body Collapse 1 = No collapse with only 50% of vertebral body involved   Posterior Spinal Element Involvement 1 = Unilateral   Total = 10   Interpretation: SINS 7-12 = Potentially Unstable    Oncological Status  Revised Tokuhashi Score (SPINE 2005)  General Condition 2 = KPS >70   # Extraspinal bone metastases 2 = 1   # Mets to vertebral body 1 = 2 metastases   # Metastases to major internal organs 1 = removal   Primary 0 = Lung, osteosarcoma, GI, bladder, esophalgus, pancreas   Neurological function 2 = Intact (Frankel E)   Total = 8   Tokuhashi Score 0-8.  Recommend Conservative management. Expected survival <6 months    Currently Porfirio could benefit from Tissue diagnosis and stabilization     Treatment options   (Spinal Oncology Group Recommendations SPINE Vol 34, 22S, 2009)    Steroids - No role currently    Bisphosphonates - Inhibits osteoclasts and may have a direct inhibition on tumor cells.   Reduced risk of pathological fracture (OR 0.6-0.9),hypercalcemia (OR 0.2-0.7), and need for radiation or surgery (OR 0.67-1.0).  But does not change mortality. [employer] 2009.    Radiation - Favorable tumors include Lymphoma, Seminoma, Myeloma.  Intermediate tumors include breast and prostate. Unfavorable NSCLC, Renal, Melanoma, GI and sarcoma.  IMRT, 16Gy in one dose.  85% improvement in pain, 42-90% improvement in neurological function.  0.5% of post radiation myelopathy. SOG recommends to reduce pain and maintain ambulation in patients with sensitive disease without instability.    Embolization - Usually only indicated in renal cell or thyroid carcinoma as a preoperative intervention to reduce surgical blood loss by 60%.    Kyphoplasty with OsteoCool - Provides biopsy, pain control, stabilization, and palliative treatment in poor surgical candidates.  Contraindicated in patients with compression lesions.  In a study of 204 patients there was a 0.5% incidence of medical complications, 0% incidence of neurological compromise, 12.1% chance of extravasation, 0% chance of painful extravasation, and a 2.9% chance of adjacent vertebral fracture. In a separate study of 143 vertebrae (89 thoracic spine, 53 lumbar spine, and 1 sacral spine) local control rates of 97.1, 95.9, and 94.2% at 6, 12, and 24 months with median follow-up was 14.5 months (range 0.4-109). Local progression occurred in 10 patients (7%) after a median time of 22.3 months. Radiat Oncol. 2020 Nov 12;15(1):263. SOG recommends in patients with painful compression fractures secondary to metastatic disease (moderate quality evidence).    Surgical decompression with or without stabilization. Indications for surgery include diagnosis, stability, neurological decline with loss of ambulation less than 48 hrs, or epidural compression. Surgical decompression and radiation improve maintenance of continence (156 vs 17 days), ambulation/LAST  score (566 vs 72 days), and survival (126 vs 100 days) over radiation alone in patients with epidural compression without radiosensitive tumor, not paralyzed >48 hours, without brain mets, or <3 months survival.  Ninfa et al, LANCET 2005, 366:643-48.  SOG recommends surgery in patients with high-grade epidural spinal cord compression (Strong recommendation with moderate quality evidence)    Treatment Algorithm  I tend to utilize to algorithms.  Based on the algorithm for spinal metastasis developed and prospectively applied by Amanda in [facility] since 2002 and the neurologic, oncological, mechanical, and systemic (NOMS) decision framework utilized over the last 15 years and [facility].          Kristopher MORALES et al. Evaluation of the Metastatic Spine Disease Multidisciplinary Working Group Algorithms as Part of a Multidisciplinary Spine Tumor Conference. Global Spine J. 2020 Oct;10(7):888-895.      Based on these SOG Recommendations Porfirio and GENEVIEVE had an extensive discussion regarding the risk, benefits, and possible complications regarding observation, needle biopsy, Kyphoplasty, OsteoCool, resection and stabilization, SRS, SBRT, and chemotherapy in various combinations.         Assessment & Plan  1. Back Pain   Lytic lesion at L1 and T11 consistent with metastatic disease to the spine.   Suspected neoplasm cancer to the spine.  Back pain is reported, primarily on the right side, worsening with prolonged standing. Imaging studies, including MRI of the thoracic spine, reveal lesions at T11, L1, and L2. The lesion at L1 is causing pressure on the spinal cord with epidural compression, while the T11 lesion is not causing significant issues at this time but the tumor has invaded the right facet. Various treatment options were discussed, including conservative therapy, biopsy, radiation, and combined biopsy and stabilization surgery. Given the proximity of the L1 lesion to the nerve roots and  cauda equina, radiation is not recommended at this time. The patient prefers to proceed with a biopsy first. An open T11 biopsy will be scheduled within the week, followed by a T10-L3 instrumented fusion and L1 corpectomy within 2 weeks if the biopsy confirms cancer. Post-surgery, chemotherapy and radiation will be considered based on the biopsy results and the patients relatively good prognosis. Risks of the biopsy include potential damage to the spinal cord, bleeding after surgery, and spreading the cancer. The major surgery will involve removing the lesion, stabilizing the spine with screws and rods, and placing a titanium cage to maintain spinal stability. Post-surgery recovery will include a hospital stay of approximately one week, followed by inpatient physical therapy if needed. The patient will be advised to avoid bending, lifting, and twisting for 2-4 weeks post-surgery. Chemotherapy and radiation will likely begin 2 weeks after surgery.    per the  IP code list the specific codes that are on the list and that's why you requested IP status for this patient.     2. Cervical kyphosis  MRI of the cervical spine shows no evidence of cancer or osseous metastatic lesions but indicates a healed fracture at the right C2-C3 facet, likely due to past trauma. Degenerative disk disease is present, but no emergent issues with the neck.     Diabetes mellitus.  Ozempic injections have been resumed approximately 2-3 months ago to manage diabetes and aid in weight loss. The patient previously lost about 40 pounds on Ozempic before discontinuing it. Continued use of Ozempic is recommended to manage blood glucose levels and support weight loss efforts. Potential side effects of Ozempic include gastrointestinal symptoms such as nausea, vomiting, and diarrhea. The patient should monitor blood glucose levels regularly and report any significant changes or adverse effects. Regular follow-up appointments will be necessary to  assess the effectiveness of the treatment and make any necessary adjustments.          Diagnoses and all orders for this visit:    1. 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)  -     Case Request; Standing  -     CBC (No Diff); Future  -     Comprehensive Metabolic Panel; Future  -     MRSA Screen Culture (Outpatient) - Swab, Nares; Future  -     Type & Screen; Future  -     ECG 12 Lead; Future  -     XR Chest 1 View; Future  -     chlorhexidine (PERIDEX) 0.12 % solution 15 mL  -     ceFAZolin (ANCEF) 2 g in sodium chloride 0.9 % 100 mL IVPB  -     Case Request  -     Case Request; Standing  -     CBC & Differential; Future  -     Comprehensive Metabolic Panel; Future  -     Urinalysis without microscopic (no culture) - Urine, Clean Catch; Future  -     Vitamin D 25 Hydroxy; Future  -     MRSA Screen Culture (Outpatient) - Swab, Nares; Future  -     Type & Screen; Future  -     ECG 12 Lead; Future  -     XR Chest 1 View; Future  -     ceFAZolin (ANCEF) 2 g in sodium chloride 0.9 % 100 mL IVPB  -     Case Request    2. Tobacco abuse    3. Other secondary kyphosis, cervical region    4. 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  -     Vitamin D 25 Hydroxy; Future    Other orders  -     Outpatient In A Bed; Standing  -     Follow Anesthesia Guidelines / Protocol; Future  -     Follow Anesthesia Guidelines / Protocol; Standing  -     Verify NPO Status; Standing  -     SCD (Sequential Compression Device) - Place on Patient in Pre-Op; Standing  -     Have Patient Void Prior to Procedure; Standing  -     Verify / Perform Chlorhexidine Skin Prep; Standing  -     Provide NPO Instructions to Patient; Future  -     Chlorhexidine Skin Prep; Future  -     Provide Patient with Enhanced Recovery Booklet(s) or Handout; Future  -     Inpatient Admission; Standing  -     Follow Anesthesia Guidelines / Protocol; Future  -     Follow Anesthesia Guidelines / Protocol; Standing  -     Verify NPO  Status; Standing  -     SCD (Sequential Compression Device) - Place on Patient in Pre-Op; Standing  -     Verify / Perform Chlorhexidine Skin Prep; Standing  -     Insert Indwelling Urinary Catheter; Standing  -     Assess Need for Indwelling Urinary Catheter - Follow Removal Protocol; Standing  -     Urinary Catheter Care; Standing  -     Provide NPO Instructions to Patient; Future  -     Chlorhexidine Skin Prep; Future  -     Provide Patient with Enhanced Recovery Booklet(s) or Handout; Future  -     Prepare RBC, 2 Units; Standing  -     Chlorhexidine Gluconate 4 % solution; Shower each day with solution for 5 days beginning 5 days before surgery.  Dispense: 120 mL; Refill: 0          Return for POSTOPERATIVELY.    Thank you for this Consultation and the opportunity to participate in Porfirio's care.    Sincerely,  Endy Marte MD    I spent 61 minutes caring for Porfirio on this date of service. This time includes time spent by me in the following activities: preparing for the visit, reviewing tests, obtaining and/or reviewing a separately obtained history, performing a medically appropriate examination and/or evaluation, counseling and educating the patient/family/caregiver, ordering medications, tests, or procedures, referring and communicating with other health care professionals, documenting information in the medical record, independently interpreting results and communicating that information with the patient/family/caregiver, and/or care coordination.         Electronically signed by Endy Marte MD at 07/21/25 0194       Vital Signs (last 2 days)       Date/Time Temp Temp src Pulse Resp BP Patient Position SpO2    07/30/25 0630 -- -- 101 19 149/88 -- 98    07/30/25 0615 -- -- 97 18 134/73 -- 94    07/30/25 0600 -- -- 110 17 154/87 -- 93    07/30/25 0545 -- -- 107 17 112/86 -- 97    07/30/25 0530 -- -- 104 16 138/88 -- 100    07/30/25 0515 -- -- 102 17 137/85 -- 99    07/30/25 0500 -- -- 103  18 123/89 -- 98    07/30/25 0445 -- -- 99 18 146/87 -- 98    07/30/25 0430 -- -- 102 -- 137/85 -- 97    07/30/25 0415 -- -- 104 17 143/81 -- 96    07/30/25 0400 99.4 (37.4) Axillary 96 18 138/88 -- 98    07/30/25 0345 -- -- 99 -- 111/57 -- 100    07/30/25 0330 -- -- 105 -- 142/80 -- 98    07/30/25 0315 -- -- 105 -- 134/71 -- 99    07/30/25 0300 -- -- 101 13 135/74 -- 99    07/30/25 0245 -- -- 99 17 141/89 -- 95    07/30/25 0230 -- -- 102 18 136/76 -- 100    07/30/25 0215 -- -- 96 14 132/87 -- 97    07/30/25 0200 -- -- 102 11 127/79 -- 97    07/30/25 0145 -- -- 128 12 139/86 -- 99    07/30/25 0130 -- -- 104 16 133/68 -- 100    07/30/25 0117 -- -- 102 -- 104/69 -- 97    07/30/25 0100 -- -- 103 14 133/90 -- 99    07/30/25 0045 -- -- 104 -- 132/77 -- 99    07/30/25 0030 -- -- 129 -- 126/76 -- 97    07/30/25 0015 -- -- 128 -- 110/64 -- 97    07/30/25 0000 -- -- 101 13 121/83 -- 96    07/29/25 2345 98.2 (36.8) Axillary 130 15 110/78 -- 97    07/29/25 2333 -- -- 103 11 113/88 -- 100    07/29/25 2330 -- -- -- -- -- -- 98    07/29/25 2315 -- -- 99 -- 114/76 -- 97    07/29/25 2300 -- -- 101 -- 139/81 -- 98    07/29/25 2229 -- -- 93 -- 136/83 -- 97    07/29/25 2215 -- -- 95 18 131/87 -- 100    07/29/25 2200 -- -- 94 12 131/81 -- 96    07/29/25 2145 -- -- 93 -- 123/83 -- 99    07/29/25 2130 -- -- 82 -- 118/76 -- 98    07/29/25 2115 -- -- 76 -- 120/78 -- 98    07/29/25 2100 97.6 (36.4) Oral 91 16 118/69 -- 99    07/29/25 2045 -- -- 88 -- 113/77 -- 98    07/29/25 2030 -- -- 91 20 104/70 -- 98    07/29/25 1950 -- -- 97 -- -- -- 95    07/29/25 1930 -- -- 94 24 146/89 -- 98    07/29/25 1920 -- -- 121 -- -- -- 96    07/29/25 1915 -- -- 95 21 111/75 -- 98    07/29/25 1910 -- -- 94 -- -- -- 97    07/29/25 1900 -- -- 83 16 126/71 -- 100    07/29/25 1850 -- -- 83 -- -- -- 100    07/29/25 1845 -- -- 89 14 139/80 -- 100    07/29/25 1840 -- -- 90 -- -- -- 100    07/29/25 1835 -- -- 95 -- -- -- 100    07/29/25 1831 97.8 (36.6) Temporal 88 18  103/60 Lying 100    07/29/25 1056 -- -- 100 18 -- -- 98    07/29/25 1004 97.1 (36.2) Temporal 101 18 159/106 -- 95          Oxygen Therapy (last 2 days)       Date/Time SpO2 Device (Oxygen Therapy) Flow (L/min) (Oxygen Therapy) Oxygen Concentration (%) ETCO2 (mmHg)    07/30/25 0630 98 -- -- -- --    07/30/25 0615 94 -- -- -- --    07/30/25 0600 93 -- -- -- --    07/30/25 0545 97 -- -- -- --    07/30/25 0530 100 -- -- -- --    07/30/25 0515 99 -- -- -- --    07/30/25 0500 98 -- -- -- --    07/30/25 0445 98 -- -- -- --    07/30/25 0430 97 -- -- -- --    07/30/25 0415 96 -- -- -- --    07/30/25 0400 98 -- -- -- --    07/30/25 0345 100 -- -- -- --    07/30/25 0330 98 -- -- -- --    07/30/25 0315 99 -- -- -- --    07/30/25 0300 99 -- -- -- --    07/30/25 0245 95 -- -- -- --    07/30/25 0230 100 -- -- -- --    07/30/25 0215 97 -- -- -- --    07/30/25 0200 97 -- -- -- --    07/30/25 0145 99 -- -- -- --    07/30/25 0130 100 -- -- -- --    07/30/25 0117 97 -- -- -- --    07/30/25 0100 99 -- -- -- --    07/30/25 0045 99 -- -- -- --    07/30/25 0030 97 -- -- -- --    07/30/25 0015 97 -- -- -- --    07/30/25 0000 96 -- -- -- --    07/29/25 2345 97 -- -- -- --    07/29/25 2333 100 -- -- -- --    07/29/25 2330 98 -- -- -- --    07/29/25 2315 97 -- -- -- --    07/29/25 2300 98 -- -- -- --    07/29/25 2229 97 -- -- -- --    07/29/25 2215 100 -- -- -- --    07/29/25 2200 96 nasal cannula with ETCO2 2 -- --    07/29/25 2145 99 -- -- -- --    07/29/25 2130 98 -- -- -- --    07/29/25 2115 98 -- -- -- --    07/29/25 2100 99 -- -- -- --    07/29/25 2045 98 -- -- -- --    07/29/25 2030 98 -- -- -- --    07/29/25 1950 95 -- -- -- --    07/29/25 1930 98 room air -- -- --    07/29/25 1920 96 -- -- -- --    07/29/25 1915 98 room air -- -- --    07/29/25 1910 97 -- -- -- --    07/29/25 1900 100 room air -- -- --    07/29/25 1850 100 -- -- -- --    07/29/25 1845 100 simple face mask 8 -- --    07/29/25 1840 100 -- -- -- --    07/29/25 1835 100  -- -- -- --    07/29/25 1831 100 simple face mask 8 -- --    07/29/25 1056 98 room air -- -- --    07/29/25 1004 95 room air -- -- --          Intake & Output (last 2 days)         07/28 0701 07/29 0700 07/29 0701 07/30 0700 07/30 0701 07/31 0700    P.O.  25     I.V. (mL/kg)  4155.5 (41.1)     IV Piggyback  700     Total Intake(mL/kg)  4880.5 (48.3)     Urine (mL/kg/hr)  1350     Drains  280     Blood  1800     Total Output  3430     Net  +1450.5                  Facility-Administered Medications as of 7/30/2025   Medication Dose Route Frequency Provider Last Rate Last Admin    ascorbic acid (VITAMIN C) tablet 1,000 mg  1,000 mg Oral Daily Miguel Can APRN   1,000 mg at 07/29/25 2050    aspirin EC tablet 81 mg  81 mg Oral Daily Miguel Can APRN        atorvastatin (LIPITOR) tablet 40 mg  40 mg Oral Nightly Miguel Can APRN   40 mg at 07/29/25 2050    Calcium Replacement - Follow Nurse / BPA Driven Protocol   Not Applicable PRN Miguel Can APRN        [COMPLETED] ceFAZolin 2000 mg IVPB in 100 mL NS (MBP)  2 g Intravenous Once Endy Marte MD   2 g at 07/15/25 1021    [COMPLETED] ceFAZolin 2000 mg IVPB in 100 mL NS (MBP)  2 g Intravenous Once Endy Marte MD   2 g at 07/29/25 1221    ceFAZolin 2000 mg IVPB in 100 mL NS (MBP)  2 g Intravenous Q8H Miguel Can APRN   2 g at 07/29/25 2358    Chlorhexidine Gluconate Cloth 2 % pads 1 Application  1 Application Topical Daily Endy Maret MD   1 Application at 07/30/25 0529    cyclobenzaprine (FLEXERIL) tablet 10 mg  10 mg Oral TID Miguel Can APRN   10 mg at 07/29/25 2050    dextrose (D50W) (25 g/50 mL) IV injection 10-50 mL  10-50 mL Intravenous Q15 Min PRN Miguel Can APRN        dextrose (GLUTOSE) oral gel 15 g  15 g Oral Q15 Min PRN Miguel Can APRN        Glucagon (GLUCAGEN) injection 1 mg  1 mg Intramuscular Q15 Min PRN Miguel Can APRN        heparin (porcine) 5000 UNIT/ML injection 5,000 Units  5,000  Units Subcutaneous Q12H Miguel Can APRN   5,000 Units at 07/30/25 0529    HYDROcodone-acetaminophen (NORCO)  MG per tablet 1 tablet  1 tablet Oral Q4H PRN Miguel Can APRN   1 tablet at 07/30/25 0533    HYDROcodone-acetaminophen (NORCO) 7.5-325 MG per tablet 1 tablet  1 tablet Oral Q4H PRN Miguel Can APRN        HYDROmorphone (DILAUDID) PCA 0.2 mg/mL 30 mL syringe   Intravenous Continuous RusMiguel galeano APRN   New Bag at 07/29/25 2009    insulin glargine (LANTUS, SEMGLEE) injection 1-200 Units  1-200 Units Subcutaneous Nightly - Glucommander Miguel Can APRN   25 Units at 07/29/25 2051    insulin lispro (humaLOG) injection 1-200 Units  1-200 Units Subcutaneous 4x Daily With Meals & Nightly Miguel Can APRN        insulin lispro (humaLOG) injection 1-200 Units  1-200 Units Subcutaneous PRN Miguel Can APRN        Lidocaine 4 % 1 patch  1 patch Transdermal Q24H Miguel Can APRN   1 patch at 07/29/25 2050    losartan (COZAAR) tablet 25 mg  25 mg Oral Daily Miguel Can APRN        Magnesium Standard Dose Replacement - Follow Nurse / BPA Driven Protocol   Not Applicable PRN Miguel Can APRN        metFORMIN ER (GLUCOPHAGE-XR) 24 hr tablet 500 mg  500 mg Oral BID With Meals Miguel Can APRN        [COMPLETED] Midazolam HCl (PF) (VERSED) injection 2 mg  2 mg Intravenous Once Rodriguez Trevino MD   2 mg at 07/29/25 1103    mupirocin (BACTROBAN) 2 % nasal ointment 1 Application  1 Application Each Nare BID Endy Marte MD   1 Application at 07/30/25 0529    naloxone (NARCAN) injection 0.1 mg  0.1 mg Intravenous Q5 Min PRN Miguel Can APRN        norepinephrine (LEVOPHED) 8 mg in 250 mL NS infusion (premix)  0.02-0.3 mcg/kg/min Intravenous Titrated Endy Marte MD        ondansetron ODT (ZOFRAN-ODT) disintegrating tablet 4 mg  4 mg Oral Q6H PRN Miguel Can APRN        Or    ondansetron (ZOFRAN) injection 4 mg  4 mg Intravenous Q6H PRN Miguel Can,  KACEY        Pharmacy Consult   Not Applicable Continuous PRN RusMiguel galeano APRN        Phosphorus Replacement - Follow Nurse / BPA Driven Protocol   Not Applicable PRN EmiltMiguel APRN        polyethylene glycol (MIRALAX) packet 17 g  17 g Oral Daily RustMiguel APRN        Potassium Replacement - Follow Nurse / BPA Driven Protocol   Not Applicable PRN RustMiguel APRN        sennosides-docusate (PERICOLACE) 8.6-50 MG per tablet 2 tablet  2 tablet Oral BID RustMiguel APRN        sodium chloride 0.9 % flush 10 mL  10 mL Intravenous Q12H RustMiguel APRN   10 mL at 07/29/25 2039    sodium chloride 0.9 % flush 10 mL  10 mL Intravenous PRN RustMiguel APRN        sodium chloride 0.9 % infusion 40 mL  40 mL Intravenous PRN RustMiguel APRN        sodium chloride 0.9 % infusion  30 mL/hr Intravenous Continuous PRN Rust, KACEY Diamond        Vitamin A capsule 20,000 Units  20,000 Units Oral Daily Rust, KACEY Diamond   20,000 Units at 07/29/25 2050    zinc sulfate (ZINCATE) capsule 220 mg  220 mg Oral Daily Rust, KACEY Diamond   220 mg at 07/29/25 2050     Orders (last 48 hrs)        Start     Ordered    07/30/25 1400  XR Scoliosis Complete Including Supine & Erect  1 Time Imaging         07/29/25 1947 07/30/25 0900  losartan (COZAAR) tablet 25 mg  Daily         07/29/25 1947 07/30/25 0900  aspirin EC tablet 81 mg  Daily         07/29/25 1947 07/30/25 0800  metFORMIN ER (GLUCOPHAGE-XR) 24 hr tablet 500 mg  2 Times Daily With Meals         07/29/25 1947 07/30/25 0800  Oral Care  2 Times Daily       07/29/25 1947 07/30/25 0600  CBC Auto Differential  Daily       07/29/25 1947 07/30/25 0600  Comprehensive Metabolic Panel  Daily       07/29/25 1947 07/30/25 0600  heparin (porcine) 5000 UNIT/ML injection 5,000 Units  Every 12 Hours Scheduled         07/29/25 1947 07/30/25 0600  Clear & Record PCA Settings  2x Daily PCA       07/29/25 1947 07/30/25 0245  Chlorhexidine Gluconate  Cloth 2 % pads 1 Application  Daily         07/30/25 0159 07/30/25 0245  mupirocin (BACTROBAN) 2 % nasal ointment 1 Application  2 Times Daily         07/30/25 0159 07/30/25 0000  ceFAZolin 2000 mg IVPB in 100 mL NS (MBP)  Every 8 Hours         07/29/25 1947 07/29/25 2200  Incentive Spirometry  Every 4 Hours While Awake       07/29/25 1947 07/29/25 2200  POC Glucose 4x Daily Before Meals & at Bedtime  4 Times Daily Before Meals & at Bedtime      Comments: Complete no more than 45 minutes prior to patient eating      07/29/25 1947 07/29/25 2100  atorvastatin (LIPITOR) tablet 40 mg  Nightly         07/29/25 1947 07/29/25 2100  sennosides-docusate (PERICOLACE) 8.6-50 MG per tablet 2 tablet  2 Times Daily         07/29/25 1947 07/29/25 2100  cyclobenzaprine (FLEXERIL) tablet 10 mg  3 Times Daily         07/29/25 1947 07/29/25 2100  sodium chloride 0.9 % flush 10 mL  Every 12 Hours Scheduled         07/29/25 1947 07/29/25 2100  insulin glargine (LANTUS, SEMGLEE) injection 1-200 Units  Nightly - Glucommander         07/29/25 1947 07/29/25 2100  insulin lispro (humaLOG) injection 1-200 Units  4 Times Daily With Meals & Nightly         07/29/25 1947 07/29/25 2045  polyethylene glycol (MIRALAX) packet 17 g  Daily         07/29/25 1947 07/29/25 2045  Lidocaine 4 % 1 patch  Every 24 Hours Scheduled         07/29/25 1947 07/29/25 2045  Vitamin A capsule 20,000 Units  Daily         07/29/25 1947 07/29/25 2045  ascorbic acid (VITAMIN C) tablet 1,000 mg  Daily         07/29/25 1947 07/29/25 2045  zinc sulfate (ZINCATE) capsule 220 mg  Daily         07/29/25 1947 07/29/25 2045  HYDROmorphone (DILAUDID) PCA 0.2 mg/mL 30 mL syringe  Continuous         07/29/25 1947 07/29/25 2044  POC Glucose Once  PROCEDURE ONCE        Comments: Complete no more than 45 minutes prior to patient eating      07/29/25 2041 07/29/25 2006  Oxygen Therapy- Nasal Cannula; Titrate 1-6 LPM Per SpO2; 90 -  95%  Continuous         07/29/25 2005 07/29/25 2006  Continuous Pulse Oximetry  Continuous         07/29/25 2005 07/29/25 2006  Assess Need for Indwelling Urinary Catheter - Follow Removal Protocol  Continuous        Comments: Follow Protocol As Outlined in Process Instructions (Open Order Report to View Full Instructions)    07/29/25 2005 07/29/25 2006  Urinary Catheter Care  Every Shift       07/29/25 2005 07/29/25 2000  Vital Signs  Every 4 Hours       07/29/25 1947 07/29/25 2000  Neuro Checks  Every 4 Hours       07/29/25 1947 07/29/25 1948  Notify Provider if Bladder Distention Continues  Continuous        Comments: Open Order Report to View Parameters Requiring Provider Notification    07/29/25 1947 07/29/25 1948  Consult Pharmacist For Review of Medications That May Cause Urinary Retention - RN To Place Order for Consult it Needed  Continuous         07/29/25 1947 07/29/25 1948  Code Status and Medical Interventions: CPR (Attempt to Resuscitate); Full Support  Continuous         07/29/25 1947 07/29/25 1948  Notify Provider (With Default Parameters)  Until Discontinued         07/29/25 1947 07/29/25 1948  Ambulate Patient  Every Shift       07/29/25 1947 07/29/25 1948  Advance Diet As Tolerated -  Until Discontinued         07/29/25 1947 07/29/25 1948  Intake & Output  Every Shift       07/29/25 1947 07/29/25 1948  Weigh Patient  Once         07/29/25 1947 07/29/25 1948  Fall Precautions  Continuous         07/29/25 1947 07/29/25 1948  OT Consult: Eval & Treat Post-Surgery Care  Once         07/29/25 1947 07/29/25 1948  PT Consult: Eval & Treat As Tolerated; Post Surgery Care  Once         07/29/25 1947 07/29/25 1948  Insert Peripheral IV  Once         07/29/25 1947 07/29/25 1948  Saline Lock & Maintain IV Access  Continuous         07/29/25 1947 07/29/25 1948  Bowel Regimen Not Indicated  Once         07/29/25 1947 07/29/25 1948  Place Sequential  Compression Device  Once         07/29/25 1947 07/29/25 1948  Maintain Sequential Compression Device  Continuous         07/29/25 1947 07/29/25 1948  Diet: Regular/House; Fluid Consistency: Thin (IDDSI 0)  Diet Effective Now         07/29/25 1947 07/29/25 1948  Initiate Glucommander™ SQ  Once         07/29/25 1947 07/29/25 1948  Patient is on Glucommander  Continuous         07/29/25 1947 07/29/25 1948  RN to Order STAT Glucose (Lab Performed) for POC Glucose <10 or >600  Continuous        Comments: Do NOT Delay Treatment of Unstable Patient to Obtain Glucose Sample From Lab  Notify Provider of Results    07/29/25 1947 07/29/25 1948  Nasal Cannula  Continuous         07/29/25 1947 07/29/25 1948  Assess Respiratory Rate, Pain Score & Sedation Level Using Pasero Opioid-Sedation Scale (POSS)  Per Order Details        Comments: Assess Every 2 Hours x24 Hours, Then Every 4 Hours & PRN While Receiving PCA.    07/29/25 1947 07/29/25 1948  Notify Provider for Inadequate Pain Control, Excessive Sedation or Other Issues Regarding PCA Therapy  Continuous        Comments: Open Order Report to View Parameters Requiring Provider Notification    07/29/25 1947 07/29/25 1948  Use a Dedicated Line for PCA Infusion  Continuous         07/29/25 1947 07/29/25 1948  Initiate & Follow Hypercapnic Monitoring Guideline for Opioid Administration via EtCO2 and / or SpO2  Continuous        Comments: Follow Hypercapnic Monitoring Guideline As Outlined in Process Instructions (Open Order Report to View Full Instructions)    07/29/25 1947 07/29/25 1948  Opioid Administration - Document EtCO2 and / or SpO2 With Each Set of Vitals & Any Change in Patient Status  Per Order Details        Comments: With Each Set of Vitals & Any Change in Patient Status    07/29/25 1947 07/29/25 1948  Opioid Administration - Notify Provider Hypercapnic Monitoring  Continuous        Comments: Open Order Report to View Parameters  "Requiring Provider Notification    07/29/25 1947 07/29/25 1948  Opioid Administration - Continuous Pulse Oximetry (SpO2)  Continuous,   Status:  Canceled         07/29/25 1947 07/29/25 1948  Target Arousal Level RASS -1 to -2  Continuous         07/29/25 1947 07/29/25 1947  sodium chloride 0.9 % flush 10 mL  As Needed         07/29/25 1947 07/29/25 1947  sodium chloride 0.9 % infusion 40 mL  As Needed         07/29/25 1947 07/29/25 1947  Potassium Replacement - Follow Nurse / BPA Driven Protocol  As Needed         07/29/25 1947 07/29/25 1947  Magnesium Standard Dose Replacement - Follow Nurse / BPA Driven Protocol  As Needed         07/29/25 1947 07/29/25 1947  Phosphorus Replacement - Follow Nurse / BPA Driven Protocol  As Needed         07/29/25 1947 07/29/25 1947  Calcium Replacement - Follow Nurse / BPA Driven Protocol  As Needed         07/29/25 1947 07/29/25 1947  ondansetron ODT (ZOFRAN-ODT) disintegrating tablet 4 mg  Every 6 Hours PRN        Placed in \"Or\" Linked Group    07/29/25 1947 07/29/25 1947  ondansetron (ZOFRAN) injection 4 mg  Every 6 Hours PRN        Placed in \"Or\" Linked Group    07/29/25 1947 07/29/25 1947  HYDROcodone-acetaminophen (NORCO)  MG per tablet 1 tablet  Every 4 Hours PRN         07/29/25 1947 07/29/25 1947  HYDROcodone-acetaminophen (NORCO) 7.5-325 MG per tablet 1 tablet  Every 4 Hours PRN         07/29/25 1947 07/29/25 1947  insulin lispro (humaLOG) injection 1-200 Units  As Needed         07/29/25 1947 07/29/25 1947  dextrose (GLUTOSE) oral gel 15 g  Every 15 Minutes PRN         07/29/25 1947 07/29/25 1947  dextrose (D50W) (25 g/50 mL) IV injection 10-50 mL  Every 15 Minutes PRN         07/29/25 1947 07/29/25 1947  Glucagon (GLUCAGEN) injection 1 mg  Every 15 Minutes PRN         07/29/25 1947 07/29/25 1947  Pharmacy Consult  Continuous PRN         07/29/25 1947 07/29/25 1947  naloxone (NARCAN) injection 0.1 mg  Every 5 " Minutes PRN         07/29/25 1947 07/29/25 1947  sodium chloride 0.9 % infusion  Continuous PRN         07/29/25 1947 07/29/25 1916  norepinephrine (LEVOPHED) 8 mg in 250 mL NS infusion (premix)  Titrated         07/29/25 1914 07/29/25 1915  Inpatient Intensivist Consult  Once        Specialty:  Intensive Care  Provider:  Aquilino Tripp MD    07/29/25 1914 07/29/25 1818  POC Glucose STAT  STAT        Comments: Post op Glucose Check on All Diabetic Patients, Notify Anesthesia if Blood Sugar is Less Than 80 mg/dL or Greater Than 250mg/dL      07/29/25 1817 07/29/25 1818  Vital signs every 5 minutes for 15 minutes, every 15 minutes thereafter.  Once         07/29/25 1817 07/29/25 1818  Call Anesthesiologist for additional IV Fluid bolus for Hypotension/Tachycardia  Continuous         07/29/25 1817 07/29/25 1818  Notify Anesthesia of Any Acute Changes in Patient Condition  Until Discontinued         07/29/25 1817 07/29/25 1818  Notify Anesthesia for Unrelieved Pain  Until Discontinued         07/29/25 1817 07/29/25 1818  Once DC criteria to floor met, follow surgeon's orders.  Until Discontinued         07/29/25 1817 07/29/25 1818  Discharge patient from PACU when discharge criteria is met.  Until Discontinued         07/29/25 1817 07/29/25 1817  Apply warming blanket  As Needed,   Status:  Canceled      Comments: For a recorded temp of <36.9 C    07/29/25 1817 07/29/25 1817  ibuprofen (ADVIL,MOTRIN) tablet 600 mg  Every 6 Hours PRN,   Status:  Discontinued         07/29/25 1817 07/29/25 1817  oxyCODONE-acetaminophen (PERCOCET)  MG per tablet 1 tablet  Every 4 Hours PRN,   Status:  Discontinued         07/29/25 1817 07/29/25 1817  HYDROmorphone (DILAUDID) injection 0.5 mg  Every 10 Minutes PRN,   Status:  Discontinued         07/29/25 1817 07/29/25 1817  fentaNYL citrate (PF) (SUBLIMAZE) injection 50 mcg  Every 10 Minutes PRN,   Status:  Discontinued          "07/29/25 1817 07/29/25 1817  naloxone (NARCAN) injection 0.04 mg  As Needed,   Status:  Discontinued         07/29/25 1817 07/29/25 1817  flumazenil (ROMAZICON) injection 0.2 mg  As Needed,   Status:  Discontinued         07/29/25 1817 07/29/25 1817  ondansetron (ZOFRAN) injection 4 mg  Every 15 Minutes PRN,   Status:  Discontinued         07/29/25 1817 07/29/25 1817  droperidol (INAPSINE) injection 0.625 mg  Once As Needed,   Status:  Discontinued        Placed in \"Or\" Linked Group    07/29/25 1817 07/29/25 1817  droperidol (INAPSINE) injection 0.625 mg  Once As Needed,   Status:  Discontinued        Placed in \"Or\" Linked Group    07/29/25 1817 07/29/25 1817  labetalol (NORMODYNE,TRANDATE) injection 5 mg  Every 5 Minutes PRN,   Status:  Discontinued         07/29/25 1817 07/29/25 1817  atropine sulfate (0.1 mg/mL) Intravenous 0.5 mg / 5 mL  Once As Needed,   Status:  Discontinued         07/29/25 1817 07/29/25 1742  Tissue Pathology Exam  RELEASE UPON ORDERING         07/29/25 1742    07/29/25 1727  Blood Gas, Arterial With Co-Ox  PROCEDURE ONCE         07/29/25 1727    07/29/25 1720  Blood Gas, Arterial With Co-Ox  Once         07/29/25 1720    07/29/25 1625  Arterial Blood Gas with Coox and Lactate  PROCEDURE ONCE         07/29/25 1625    07/29/25 1623  Blood Gas, Arterial With Co-Ox  STAT         07/29/25 1623    07/29/25 1549  Inpatient Admission  Once         07/29/25 1548    07/29/25 1454  XR Chest 1 View  1 Time Imaging,   Status:  Canceled         07/29/25 1456    07/29/25 1453  Discontinue Indwelling Urinary Catheter  Once,   Status:  Canceled         07/29/25 1456    07/29/25 1256  morphine 2mg/methylprednisolone 40mg topical 3 mL syringe  As Needed,   Status:  Discontinued         07/29/25 1256    07/29/25 1237  sodium chloride (NS) irrigation solution  As Needed,   Status:  Discontinued         07/29/25 1237    07/29/25 1237  thrombin topical  As Needed,   Status:  Discontinued   "       07/29/25 1237    07/29/25 1237  vancomycin (VANCOCIN) injection  As Needed,   Status:  Discontinued         07/29/25 1238    07/29/25 1236  bupivacaine-EPINEPHrine PF (MARCAINE w/EPI) 0.25% -[id number] injection  As Needed,   Status:  Discontinued         07/29/25 1237    07/29/25 1109  lactated ringers infusion 1,000 mL  Continuous,   Status:  Discontinued         07/29/25 1107    07/29/25 1108  Please Insert a Second Peripheral IV If Indicated For Procedure (All DaVinci Cases, Gastric Bypass, Sleeve Surgery, AAA, Any Vascular Bypass, Carotid, Sigmoidectomy, Colectomy (Lap Assisted), Colon Resection, Nissen, Exploratory Laparotomy, Spinal...  Once        Comments: Please Insert a Second Peripheral IV If Indicated For Procedure (All DaVinci Cases, Gastric Bypass, Sleeve Surgery, AAA, Any Vascular Bypass, Carotid, Sigmoidectomy, Colectomy (Lap Assisted), Colon Resection, Nissen, Exploratory Laparotomy, Spinal Fusion, Craniotomy, Thoracotomy, CABG, TAVR, Valve Surgery or Mediastinoscopy, Femoral Endarterectomy, Carotid Endarterectomy, Anterior Lumbar Exposure, or all Aneurysm Repairs    07/29/25 1107    07/29/25 1108  Insert Peripheral IV  Once         07/29/25 1107    07/29/25 1108  Maintain IV Access  Continuous,   Status:  Canceled         07/29/25 1107    07/29/25 1107  sodium chloride 0.9 % flush 10 mL  As Needed,   Status:  Discontinued         07/29/25 1107    07/29/25 1059  sodium chloride 0.9 % flush 10 mL  Every 12 Hours Scheduled,   Status:  Discontinued         07/29/25 1057    07/29/25 1059  Midazolam HCl (PF) (VERSED) injection 2 mg  Once         07/29/25 1057    07/29/25 1058  Oxygen Therapy- Nasal Cannula; Titrate 1-6 LPM Per SpO2; 90 - 95%  Continuous,   Status:  Canceled         07/29/25 1057    07/29/25 1058  Continuous Pulse Oximetry  Continuous,   Status:  Canceled         07/29/25 1057    07/29/25 1058  Insert Peripheral IV  Once         07/29/25 1057    07/29/25 1058  Saline Lock & Maintain  IV Access  Continuous,   Status:  Canceled         07/29/25 1057    07/29/25 1057  Vital Signs - Per Anesthesia Protocol  As Needed,   Status:  Canceled       07/29/25 1057    07/29/25 1057  sodium chloride 0.9 % flush 10 mL  As Needed,   Status:  Discontinued         07/29/25 1057    07/29/25 1057  fentaNYL citrate (PF) (SUBLIMAZE) injection 25 mcg  Every 5 Minutes PRN,   Status:  Discontinued         07/29/25 1057    07/29/25 1057  Midazolam HCl (PF) (VERSED) injection 0.5 mg  Every 10 Minutes PRN,   Status:  Discontinued         07/29/25 1057    07/29/25 1057  dextrose (D50W) (25 g/50 mL) IV injection 12.5 g  As Needed,   Status:  Discontinued         07/29/25 1057    07/29/25 1039  FL C Arm During Surgery  1 Time Imaging         07/29/25 1038    07/29/25 1039  FL O Arm During Surgery  1 Time Imaging         07/29/25 1038    07/29/25 1037  POC Glucose Once  PROCEDURE ONCE        Comments: Complete no more than 45 minutes prior to patient eating      07/29/25 1032    07/29/25 1028  Type & Screen  Once         07/29/25 1028    07/29/25 1021  lactated ringers infusion 1,000 mL  Continuous,   Status:  Discontinued         07/29/25 1019    07/29/25 1019  Insert Peripheral IV  Once         07/29/25 1019    07/29/25 1019  Maintain IV Access  Continuous,   Status:  Canceled         07/29/25 1019    07/29/25 1018  lidocaine PF 1% (XYLOCAINE) injection 0.5 mL  Once As Needed,   Status:  Discontinued         07/29/25 1019    07/29/25 1018  sodium chloride 0.9 % flush 3 mL  As Needed,   Status:  Discontinued         07/29/25 1019    07/29/25 1004  Modify Scheduled Case Request  Once,   Status:  Canceled         07/29/25 1003    07/29/25 0952  ceFAZolin 2000 mg IVPB in 100 mL NS (MBP)  Once         07/29/25 0950    07/29/25 0951  Follow Anesthesia Guidelines / Protocol  Once         07/29/25 0950    07/29/25 0951  Verify NPO Status  Continuous         07/29/25 0950    07/29/25 0951  SCD (Sequential Compression Device) - Place  on Patient in Pre-Op  Once         07/29/25 0950    07/29/25 0951  Verify / Perform Chlorhexidine Skin Prep  Once        Comments: Chlorhexidine Skin Wipes and Instructions For All Patients Having A Procedure Requiring an Outward Incision if Not Allergic.  If Allergic, Give Antibacterial Skin Wipes and Instructions.  Do Not Use For Facial Cases or on Any Mucus Membranes.    07/29/25 0950    07/29/25 0951  Prepare RBC, 2 Units  Blood - Once        Comments: 2 units      07/29/25 0950    Unscheduled  Bladder Scan if Patient Unable to Void 4-6 Hours After Catheter Removal  As Needed         07/29/25 1947    Unscheduled  If Bladder Scan Volume is Less Than 500mL & Patient is Without Symptoms of Bladder Discomfort / Distention Monitor Every 1-2 Hours for Spontaneous Void  As Needed       07/29/25 1947    Unscheduled  Straight Cath Every 4-6 Hours As Needed If Patient is Unable to Void After 4-6 Hours, Bladder Scan Volume is Greater Than 500mL & Patient Has Symptoms of Bladder Discomfort / Distention  As Needed       07/29/25 1947    Unscheduled  Schedule / Prompt Voiding For Patients With Urinary Incontinence  As Needed       07/29/25 1947    Unscheduled  Up in Chair  As Needed       07/29/25 1947    Unscheduled  Up With Assistance  As Needed       07/29/25 1947    Unscheduled  POC Glucose PRN  As Needed      Comments: Complete no more than 45 minutes prior to patient eating      07/29/25 1947    Unscheduled  Treat Hypoglycemia As Recommended By [employer]™ & Notify Provider of Treatment  As Needed      Comments: Follow Hypoglycemia Orders As Outlined in Process Instructions (Open Order Report to View Full Instructions)  Notify Provider Any Time Hypoglycemia Treatment is Administered    07/29/25 1947    Unscheduled  Transfuse RBC, 2 Units Infuse Each Unit Over: 3.5H  Transfusion       07/29/25 1626    --  Semaglutide,0.25 or 0.5MG/DOS, (Ozempic, 0.25 or 0.5 MG/DOSE,) 2 MG/3ML solution pen-injector  Weekly          07/29/25 1035    Signed and Held  Insert Indwelling Urinary Catheter  Once        Comments: Follow Protocol As Outlined in Process Instructions (Open Order Report to View Full Instructions)    Signed and Held

## 2025-07-30 NOTE — THERAPY EVALUATION
Patient Name: Porfirio Gates  : 1955    MRN: [id number]                              Today's Date: 2025       Admit Date: 2025    Visit Dx:     ICD-10-CM ICD-9-CM   1. 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    Chronic dyspnea    Pulmonary emphysema    Metastasis to spinal column    Metastatic cancer to spine     Past Medical History:   Diagnosis Date    Diabetes type 2     [place] (hard of hearing)     has hearing aids    Hypertension     Kidney stones      Past Surgical History:   Procedure Laterality Date    ADENOIDECTOMY      CARDIAC CATHETERIZATION N/A 2023    Procedure: Left Heart Cath;  Surgeon: Melvin Peterson MD;  Location: [facility] CATH INVASIVE LOCATION;  Service: Cardiology;  Laterality: N/A;    CHOLECYSTECTOMY      CYSTOSCOPY URETEROSCOPY LASER LITHOTRIPSY Left 3/10/2017    Procedure: CYSTOSCOPY URETEROSCOPY LASER LITHOTRIPSY;  Surgeon: Neftali Pereira MD;  Location: [facility] OR;  Service:     CYSTOSCOPY W/ URETERAL STENT PLACEMENT Left 3/10/2017    Procedure: CYSTOSCOPY URETERAL CATHETER/STENT INSERTION LEFT URETER;  Surgeon: Neftali Pereira MD;  Location: [facility] OR;  Service:     LUMBAR BONE BIOPSY N/A 7/15/2025    Procedure: OPEN T11 BONE BIOPSY;  Surgeon: Endy Marte MD;  Location: [facility] OR;  Service: Neurosurgery;  Laterality: N/A;      General Information       Row Name 25 0817          OT Time and Intention    Subjective Information pain  Pt  s/p posterior mentation T3-L3 with L1 corpectomy for lytic lesions to L1 and T11 with L1 suggesting canal compromise and violation of the posterior wall of the spinal column  -     Document Type evaluation  -     Mode of Treatment occupational therapy  -     Patient Effort good  -     Symptoms Noted During/After Treatment dizziness;significant change in vital signs  -       Row Name 07/30/25 0817          General Information    Patient Profile Reviewed yes  -JW     Prior Level of Function independent:;all household mobility;transfer;ADL's;bed mobility  -     Existing Precautions/Restrictions fall;spinal  -     Barriers to Rehab medically complex  -       Row Name 07/30/25 0817          Living Environment    Current Living Arrangements home  -     People in Home alone  -       Row Name 07/30/25 0817          Cognition    Orientation Status (Cognition) oriented x 4  -       Row Name 07/30/25 0817          Safety Issues/Impairments Affecting Functional Mobility    Safety Issues Affecting Function (Mobility) ability to follow commands;at risk behavior observed;awareness of need for assistance;insight into deficits/self-awareness;judgment;impulsivity;safety precautions follow-through/compliance;safety precaution awareness;problem-solving  -     Impairments Affecting Function (Mobility) strength;balance;endurance/activity tolerance;pain;cognition  -     Cognitive Impairments, Mobility Safety/Performance attention;awareness, need for assistance;insight into deficits/self-awareness;judgment;problem-solving/reasoning;safety precaution awareness;safety precaution follow-through;impulsivity  -               User Key  (r) = Recorded By, (t) = Taken By, (c) = Cosigned By      Initials Name Provider Type     Tereza Olivares, JESUS/L, CSRS Occupational Therapist                     Mobility/ADL's       Row Name 07/30/25 0817          Bed Mobility    Bed Mobility rolling  left;sidelying-sit;sit-sidelying  -     Rolling Left [place] (Bed Mobility) minimum assist (75% patient effort)  -     Sidelying-Sit [place] (Bed Mobility) minimum assist (75% patient effort)  -     Sit-Sidelying [place] (Bed Mobility) minimum assist (75% patient effort)  -     Bed Mobility, Safety Issues decreased use of legs for bridging/pushing;decreased use of arms for pushing/pulling  -     Assistive Device (Bed Mobility) head of bed elevated;bed rails  -[facility] Name 07/30/25 0817          Transfers    Comment, (Transfers) unable to tolerate due to dizziness.  -       Row Name 07/30/25 0817          Functional Mobility    Patient was able to Ambulate no, other medical factors prevent ambulation  -     Reason Patient was unable to Ambulate Dizziness;Hypotension  -JW       Row Name 07/30/25 0817          Activities of Daily Living    BADL Assessment/Intervention lower body dressing  -JW       Row Name 07/30/25 0817          Lower Body Dressing Assessment/Training    [place] Level (Lower Body Dressing) don;socks;maximum assist (25% patient effort)  -     Position (Lower Body Dressing) edge of bed sitting  -     Comment, (Lower Body Dressing) due to pain  -               User Key  (r) = Recorded By, (t) = Taken By, (c) = Cosigned By      Initials Name Provider Type    Tereza White OTR/L, CSRS Occupational Therapist                   Obj/Interventions       [facility] Name 07/30/25 0817          Sensory Assessment (Somatosensory)    Sensory Assessment (Somatosensory) UE sensation intact  -[facility] 07/30/25 0817          Vision Assessment/Intervention    Visual Impairment/Limitations WFL  -       Row Name 07/30/25 0817          Range of Motion Comprehensive    General Range of Motion bilateral upper extremity ROM L  -[facility] Name 07/30/25 0817          Strength Comprehensive (MMT)    Comment, General Manual Muscle Testing (MMT) Assessment B UE  strength 5/5  -       Row Name 07/30/25 0817          Motor Skills    Motor Skills coordination  -     Coordination WFL;bilateral;upper extremity  -[facility] Name 07/30/25 0817          Balance    Balance Assessment sitting static balance;sitting dynamic balance  -     Static Sitting Balance supervision  -     Dynamic Sitting Balance contact guard;minimal assist  -               User Key  (r) = Recorded By, (t) = Taken By, (c) = Cosigned By      Initials Name Provider Type     Tereza Olivares, OTR/L, CSRS Occupational Therapist                   Goals/Plan       Row Name 07/30/25 0817          Bathing Goal 1 (OT)    Activity/Device (Bathing Goal 1, OT) bathing skills, all  -     [place] Level/Cues Needed (Bathing Goal 1, OT) modified independence  -     Time Frame (Bathing Goal 1, OT) long term goal (LTG);by discharge  -     Progress/Outcomes (Bathing Goal 1, OT) new goal  -[facility] Name 07/30/25 0817          Dressing Goal 1 (OT)    Activity/Device (Dressing Goal 1, OT) dressing skills, all  -     [place]/Cues Needed (Dressing Goal 1, OT) modified independence  -     Time Frame (Dressing Goal 1, OT) long term goal (LTG);by discharge  -     Progress/Outcome (Dressing Goal 1, OT) new goal  -[facility] Name 07/30/25 0817          Toileting Goal 1 (OT)    Activity/Device (Toileting Goal 1, OT) toileting skills, all  -     [place] Level/Cues Needed (Toileting Goal 1, OT) modified independence  -     Time Frame (Toileting Goal 1, OT) long term goal (LTG);by discharge  -     Progress/Outcome (Toileting Goal 1, OT) new goal  -[facility] Name 07/30/25 0817          Problem Specific Goal 1 (OT)    Problem Specific Goal 1 (OT) Pt will independently implement one pain management technique to decrease pain and improve functional adl performance.  -     Time Frame (Problem Specific Goal 1, OT) long term goal (LTG);by discharge  -     Progress/Outcome (Problem  Specific Goal 1, OT) new goal  -       Row Name 07/30/25 0817          Therapy Assessment/Plan (OT)    Planned Therapy Interventions (OT) transfer/mobility retraining;strengthening exercise;patient/caregiver education/training;occupation/activity based interventions;functional balance retraining;activity tolerance training;BADL retraining;adaptive equipment training;neuromuscular control/coordination retraining  -               User Key  (r) = Recorded By, (t) = Taken By, (c) = Cosigned By      Initials Name Provider Type    Tereza White, OTR/L, CSRS Occupational Therapist                   Clinical Impression       Row Name 07/30/25 0817          Pain Assessment    Pretreatment Pain Rating 10/10  -     Posttreatment Pain Rating 10/10  -     Pain Side/Orientation generalized  -       Row Name 07/30/25 0817          Plan of Care Review    Plan of Care Reviewed With patient  -     Outcome Evaluation OT eval completed. Pt presents asleep but easily awakens with verbal cues. He is oriented x4 and c/o 10/10 back pain. Mr. Gates reports at baseline being independent with all adls and mobility, residing at home alone. He was educated on spinal precautions but demonstrated impulsivity with movements. He was able to bring self to sitting at EOB with Min A and vcs. Did report significant dizziness upon sitting up, BP checked and had dropped from 157/87 to 111/85. Attempted to work through dizziness but was unable to and returned back to folwers in bed with Min A. BP returned to normal upon fowlers position in bed. Mr. Gates did not demonstrate any overt neuro deficits in his UEs throughout session. He would benefit from skilled oT services to address these deficits and assist with return to PLOF. Hopeful for d/c home with family when medically able, but will depend on his progress with therapy services.  -       Row Name 07/30/25 0817          Therapy Assessment/Plan (OT)    Rehab Potential (OT) good   -     Criteria for Skilled Therapeutic Interventions Met (OT) yes;skilled treatment is necessary  -     Therapy Frequency (OT) 5 times/wk  -     Predicted Duration of Therapy Intervention (OT) 10 days  -       Row Name 07/30/25 0817          Therapy Plan Review/Discharge Plan (OT)    Anticipated Discharge Disposition (OT) home with assist  -GURJIT       Row Name 07/30/25 0817          Positioning and Restraints    Pre-Treatment Position in bed  -JW     Post Treatment Position bed  -JW     In Bed notified nsg;fowlers;call light within reach;encouraged to call for assist;side rails up x3;patient within staff view  -               User Key  (r) = Recorded By, (t) = Taken By, (c) = Cosigned By      Initials Name Provider Type    Tereza White, OTR/L, CSRS Occupational Therapist                   Outcome Measures       Row Name 07/30/25 0817          How much help from another is currently needed...    Putting on and taking off regular lower body clothing? 2  -JW     Bathing (including washing, rinsing, and drying) 2  -JW     Toileting (which includes using toilet bed pan or urinal) 2  -JW     Putting on and taking off regular upper body clothing 3  -JW     Taking care of personal grooming (such as brushing teeth) 3  -JW     Eating meals 4  -JW     AM-PAC 6 Clicks Score (OT) 16  -       Row Name 07/30/25 0815 07/30/25 0800       How much help from another person do you currently need...    Turning from your back to your side while in flat bed without using bedrails? 3  -JAMES 3  -SP    Moving from lying on back to sitting on the side of a flat bed without bedrails? 3  -JAMES 3  -SP    Moving to and from a bed to a chair (including a wheelchair)? 3  -JAMES 3  -SP    Standing up from a chair using your arms (e.g., wheelchair, bedside chair)? 3  -JAMES 3  -SP    Climbing 3-5 steps with a railing? 2  -JAMES 3  -SP    To walk in hospital room? 2  -JAMES 3  -SP    AM-PAC 6 Clicks Score (PT) 16  -JAMES 18  -SP    Highest Level of  Mobility Goal Stand (1 or More Minutes)-5  -JAMES Walk 10 Steps or More-6  -SP      Row Name 07/30/25 0817 07/30/25 0815       Functional Assessment    Outcome Measure Options AM-PAC 6 Clicks Daily Activity (OT)  -GURJIT AM-PAC 6 Clicks Basic Mobility (PT)  -JAMES              User Key  (r) = Recorded By, (t) = Taken By, (c) = Cosigned By      Initials Name Provider Type    Rene Jordan, PT DPT Physical Therapist    Nany Pineda, RN Registered Nurse    Tereza White, OTR/L, CSRS Occupational Therapist                      OT Recommendation and Plan  Planned Therapy Interventions (OT): transfer/mobility retraining, strengthening exercise, patient/caregiver education/training, occupation/activity based interventions, functional balance retraining, activity tolerance training, BADL retraining, adaptive equipment training, neuromuscular control/coordination retraining  Therapy Frequency (OT): 5 times/wk  Plan of Care Review  Plan of Care Reviewed With: patient  Outcome Evaluation: OT eval completed. Pt presents asleep but easily awakens with verbal cues. He is oriented x4 and c/o 10/10 back pain. Mr. Gates reports at baseline being independent with all adls and mobility, residing at home alone. He was educated on spinal precautions but demonstrated impulsivity with movements. He was able to bring self to sitting at EOB with Min A and vcs. Did report significant dizziness upon sitting up, BP checked and had dropped from 157/87 to 111/85. Attempted to work through dizziness but was unable to and returned back to folwers in bed with Min A. BP returned to normal upon fowlers position in bed. Mr. Gates did not demonstrate any overt neuro deficits in his UEs throughout session. He would benefit from skilled oT services to address these deficits and assist with return to PLOF. Hopeful for d/c home with family when medically able, but will depend on his progress with therapy services.     Time Calculation:          Time Calculation- OT       Row Name 07/30/25 0817             Time Calculation- OT    OT Start Time 0817  -JW      OT Stop Time 0855  -JW      OT Time Calculation (min) 38 min  -      OT Received On 07/30/25  -      OT Goal Re-Cert Due Date 08/09/25  -                User Key  (r) = Recorded By, (t) = Taken By, (c) = Cosigned By      Initials Name Provider Type     Tereza Olivares OTR/L, CSRS Occupational Therapist                  Therapy Charges for Today       Code Description Service Date Service Provider Modifiers Qty    [id number]  OT EVAL LOW COMPLEXITY 3 7/30/2025 Tereza Olivares OTR/L, CSRS GO 1                 DHIRAJ Hidalgo, KAREN  7/30/2025

## 2025-07-30 NOTE — PLAN OF CARE
Goal Outcome Evaluation:  Plan of Care Reviewed With: patient, child           Outcome Evaluation: PT eval complete. He was found in bed, daughter present. He reports being independent in his mobilty prior to admit. He was some impulsive today and needs cues for safety and safety recognition. Rates his back pain 10/10. HR was elevated in bed and RN was aware. RN was up to 157 before working with therapy. He required min assist for rolling in bed and for supine <> sit bed mobility. He complains of worsening back pain and specifically dizziness once EOB. Was unable to tolerate fruther activity and was returned to bed. BP was monitored throughout as well. BP in bed was 157/87 and dropped to 111/85 once sitting EOB. Was later 150/89 once back in bed. HR down to 110 once back in bed. B LE ROM, sensation and strength appear WFL. PT will cont with mobility training, balance training and strengthening. I am hopeful he will recover and d.c home with assist. Consider some form of placement if unable to safely d.c home.    Anticipated Discharge Disposition (PT): home with 24/7 care, sub acute care setting, skilled nursing facility

## 2025-07-30 NOTE — PROGRESS NOTES
NEUROSURGERY DAILY PROGRESS NOTE    ASSESSMENT:   Porfirio Gates is a 69 y.o. male with a significant comorbidity of known lung mass, hypertension, type 2 diabetes, class I obesity, pulmonary emphysema. He presents with a new problem of 7 out of 10 back pain. Physical exam findings of bilateral Babinski and hyperreflexia in his lower extremities with increased tone in his lower extremities as well as marked cervical kyphosis.  His imaging shows lytic lesions to L1 and T11 with L1 suggesting canal compromise and violation of the posterior wall of the spinal column on noncontrast CT.     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  Exam at baseline   1 Day Post-Op (7/29/2025) posterior mentation T3-L3 with L1 corpectomy   KATARZYNA = 280 mL, keep   PO x-rays pending   Neurochecks every 4 hours.  Call for decline   Transfer to floor today    CV: No acute issues.   Pulm: Maintaining O2 sat.  Continuous pulse oximetry.  Incentive spirometry.  Chest x-ray for acute abnormalities.  : Voiding spontaneously.  Bladder scans and I/O cath per policy.   ENDO: Accu-Chek and treat per policy  FEN: Tolerating regular diet.    GI: No acute issues.    ID: 23-hour postoperative prophylactic antibiotics.  Wound healing protocol.  Heme:  DVT prophylaxis; SCD's.  Hemoglobin 7.9.  Will continue to monitor  Pain: Tolerable with PCA.  Will transition to oral meds  Dispo: PT/OT.       CHIEF COMPLAINT:   Back pain secondary to metastatic cancer    Subjective  Stable    Temp:  [97.1 °F (36.2 °C)-99.4 °F (37.4 °C)] 99.4 °F (37.4 °C)  Heart Rate:  [] 96  Resp:  [11-24] 19  BP: (103-163)/() 163/84    Objective:  Vital signs: (most recent): Blood pressure 163/84,  "pulse 96, temperature 99.4 °F (37.4 °C), temperature source Axillary, resp. rate 19, height 175 cm (68.9\"), weight 101 kg (223 lb 5.2 oz), SpO2 99%.        Neurological Exam  Mental Status  Awake, alert and oriented to person, place and time. Speech is normal. Language is fluent with no aphasia. Attention and concentration are normal.    Cranial Nerves  CN II: Visual acuity is normal.  CN III, IV, VI: Extraocular movements intact bilaterally. Normal lids and orbits bilaterally. Pupils equal round and reactive to light bilaterally.  CN V: Facial sensation is normal.  CN VII: Full and symmetric facial movement.  CN IX, X: Palate elevates symmetrically  CN XI: Shoulder shrug strength is normal.    Motor  Normal muscle bulk throughout. Normal muscle tone.                                               Right                     Left  Toe extension                        5                          5                                             Right                     Left  Deltoid                                   5                          5   Biceps                                   5                          5   Triceps                                  5                          5   Wrist extensor                       5                          5   Finger flexor                          5                          5   Iliopsoas                               5                          5   Quadriceps                           5                          5   Gastrocnemius                     5                           5   Anterior tibialis                      5                          5    Sensory  Light touch is normal in upper and lower extremities.     Coordination    Finger-to-nose, rapid alternating movements and heel-to-shin normal bilaterally without dysmetria.    Gait    Did not assess.  Defer to PT.    Drains:   Closed/Suction Drain 1 Posterior Back Bulb 7 Fr. (Active)   Site Description Unable to view 07/30/25 " 0400   Dressing Status Clean;Intact;Dry 07/30/25 0400   Drainage Appearance Bloody 07/30/25 0400   Status To bulb suction 07/30/25 0400   Output (mL) 60 mL 07/30/25 0400       [REMOVED] Urethral Catheter Silicone 16 Fr. (Removed)   Daily Indications Required activity restriction from trauma, surgery, (e.g. unstable spine, fracture, hemodynamics) 07/30/25 0400   Site Assessment Skin intact;Clean 07/30/25 0400   Collection Container Standard drainage bag 07/30/25 0400   Securement Method Securing device 07/30/25 0400   Catheter care complete Yes 07/29/25 2009   Output (mL) 225 mL 07/30/25 0609       Output by Drain (mL) 07/29/25 0701 - 07/29/25 1900 07/29/25 1901 - 07/30/25 0700 07/30/25 0701 - 07/30/25 0812 Range Total   Closed/Suction Drain 1 Posterior Back Bulb 7 Fr. 60 220  280     Imaging Results (Last 24 Hours)       Procedure Component Value Units Date/Time    FL O Arm During Surgery [[id number]] Collected: 07/29/25 1945     Updated: 07/29/25 1950    Narrative:      EXAMINATION: FL O ARM DURING SURGERY-  7/29/2025 7:46 PM     HISTORY: Fusion.     Fluoroscopy time: 6.17 seconds.     Dose: 15.61 mGy Cumulative Air Kerma. 2 image     FINDINGS: C arm was used intraoperatively as well as a portable CT exam  during performance of fusion. Films available to the OR for review.     This report was signed and finalized on 7/29/2025 7:47 PM by Dr. Vasquez Meza MD.       FL C Arm During Surgery [[id number]] Resulted: 07/29/25 1938     Updated: 07/29/25 1938    Narrative:      This procedure was auto-finalized with no dictation required.          Lab Results (last 24 hours)       Procedure Component Value Units Date/Time    POC Glucose 4x Daily Before Meals & at Bedtime [[id number]]  (Normal) Collected: 07/30/25 0723    Specimen: Blood Updated: 07/30/25 0726     Glucose 125 mg/dL      Comment: Serial Number: [id number]:  [id number]       Comprehensive Metabolic Panel [[id number]]  (Abnormal) Collected: 07/30/25 0338     Specimen: Blood, Arterial Line Updated: 07/30/25 0416     Glucose 124 mg/dL      BUN 11.5 mg/dL      Creatinine 0.93 mg/dL      Sodium 140 mmol/L      Potassium 3.8 mmol/L      Chloride 108 mmol/L      CO2 24.0 mmol/L      Calcium 8.1 mg/dL      Total Protein 5.2 g/dL      Albumin 3.0 g/dL      ALT (SGPT) 22 U/L      AST (SGOT) 37 U/L      Alkaline Phosphatase 48 U/L      Total Bilirubin 0.5 mg/dL      Globulin 2.2 gm/dL      A/G Ratio 1.4 g/dL      BUN/Creatinine Ratio 12.4     Anion Gap 8.0 mmol/L      eGFR 89.4 mL/min/1.73     Narrative:      GFR Categories in Chronic Kidney Disease (CKD)              GFR Category          GFR (mL/min/1.73)    Interpretation  G1                    90 or greater        Normal or high (1)  G2                    60-89                Mild decrease (1)  G3a                   45-59                Mild to moderate decrease  G3b                   30-44                Moderate to severe decrease  G4                    15-29                Severe decrease  G5                    14 or less           Kidney failure    (1)In the absence of evidence of kidney disease, neither GFR category G1 or G2 fulfill the criteria for CKD.    eGFR calculation 2021 CKD-EPI creatinine equation, which does not include race as a factor    CBC Auto Differential [[id number]]  (Abnormal) Collected: 07/30/25 0339    Specimen: Blood, Arterial Line Updated: 07/30/25 0350     WBC 11.45 10*3/mm3      RBC 2.78 10*6/mm3      Hemoglobin 7.9 g/dL      Hematocrit 23.7 %      MCV 85.3 fL      MCH 28.4 pg      MCHC 33.3 g/dL      RDW 13.0 %      RDW-SD 40.2 fl      MPV 10.2 fL      Platelets 185 10*3/mm3      Neutrophil % 76.8 %      Lymphocyte % 14.8 %      Monocyte % 7.5 %      Eosinophil % 0.1 %      Basophil % 0.2 %      Immature Grans % 0.6 %      Neutrophils, Absolute 8.80 10*3/mm3      Lymphocytes, Absolute 1.69 10*3/mm3      Monocytes, Absolute 0.86 10*3/mm3      Eosinophils, Absolute 0.01 10*3/mm3      Basophils,  Absolute 0.02 10*3/mm3      Immature Grans, Absolute 0.07 10*3/mm3      nRBC 0.0 /100 WBC     POC Glucose Once [[id number]]  (Abnormal) Collected: 07/29/25 2041    Specimen: Blood Updated: 07/29/25 2043     Glucose 163 mg/dL      Comment: Serial Number: [id number]:  [id number]       POC Glucose STAT [[id number]]  (Abnormal) Collected: 07/29/25 1836    Specimen: Blood Updated: 07/29/25 1838     Glucose 178 mg/dL      Comment: Serial Number: [id number]:  [id number]       Blood Gas, Arterial With Co-Ox [[id number]]  (Abnormal) Collected: 07/29/25 1727    Specimen: Arterial Blood Updated: 07/29/25 1726     Site Arterial Line     Joaquim's Test N/A     pH, Arterial 7.482 pH units      Comment: 83 Value above reference range        pCO2, Arterial 35.2 mm Hg      pO2, Arterial 240.0 mm Hg      Comment: 83 Value above reference range        HCO3, Arterial 26.3 mmol/L      Comment: 83 Value above reference range        Base Excess, Arterial 2.9 mmol/L      Comment: 83 Value above reference range        O2 Saturation, Arterial 100.0 %      Comment: 83 Value above reference range        Hemoglobin, Blood Gas 9.8 g/dL      Comment: 84 Value below reference range        Hematocrit, Blood Gas 30.2 %      Comment: 84 Value below reference range        Oxyhemoglobin 98.2 %      Methemoglobin 0.90 %      Carboxyhemoglobin 1.0 %      Temperature 37.0     Sodium, Arterial 140 mmol/L      Potassium, Arterial 4.0 mmol/L      Ionized Calcium 4.54 mg/dL      Comment: 84 Value below reference range        Barometric Pressure for Blood Gas 753 mmHg      Modality Ventilator     FIO2 100 %      Ventilator Mode NA     Collected by [facility] CRNA     Comment: Meter: [id number]     :  Mague Godoy, RRT        pH, Temp Corrected 7.482 pH Units      Comment: 83 Value above reference range        pCO2, Temperature Corrected 35.2 mm Hg      pO2, Temperature Corrected 240 mm Hg      Comment: 83 Value above reference range        Arterial Blood Gas with Coox and Lactate [[id number]]  (Abnormal) Collected: 07/29/25 1625    Specimen: Arterial Blood Updated: 07/29/25 1624     pH, Arterial 7.500 pH units      Comment: 83 Value above reference range        pCO2, Arterial 33.6 mm Hg      Comment: 84 Value below reference range        pO2, Arterial --     HCO3, Arterial 26.2 mmol/L      Comment: 83 Value above reference range        Base Excess, Arterial 3.1 mmol/L      Comment: 83 Value above reference range        Hematocrit, Blood Gas 31.2 %      Comment: 84 Value below reference range        Hemoglobin, Blood Gas 10.2 g/dL      Comment: 84 Value below reference range        O2 Saturation, Arterial >100.1 %      Comment: 93 Value above reportable range > 100.1        Oxyhemoglobin 98.0 %      Carboxyhemoglobin 1.1 %      Methemoglobin 1.10 %      Sodium, Arterial 141 mmol/L      Potassium, Arterial 3.7 mmol/L      Ionized Calcium 4.52 mg/dL      Comment: 84 Value below reference range        A-a DO2 --     Temperature 37.1     Barometric Pressure for Blood Gas 754 mmHg      pH, Temp Corrected 7.498 pH Units      Comment: 83 Value above reference range        pCO2, Temperature Corrected 33.8 mm Hg      Comment: 84 Value below reference range        pO2, Temperature Corrected 425 mm Hg      Comment: 83 Value above reference range        Glucose, Arterial 130 mg/dL      Comment: 83 Value above reference range        Site Arterial Line     Joaquim's Test N/A     Modality Ventilator     FIO2 100 %      Flow Rate --     Nitric Oxide --     Ventilator Mode NA     Set Tidal Volume --     Set Mech Resp Rate --     Rate --     PEEP --     PSV --     PIP --     IPAP --     EPAP --     CPAP --     Pulse Ox --     Notified Who --     Notified By --     Notified Time --     Collected by SURGERY     Comment: Meter: [id number]     :  [id number]        Note --     Lactate, whole blood 1.0 mmol/L     POC Glucose Once [[id number]]  (Abnormal) Collected:  07/29/25 1032    Specimen: Blood Updated: 07/29/25 1036     Glucose 134 mg/dL      Comment: Serial Number: [id number]:  [id number]    KACEY Nunez

## 2025-07-30 NOTE — PLAN OF CARE
Goal Outcome Evaluation:  Plan of Care Reviewed With: patient           Outcome Evaluation: OT eval completed. Pt presents asleep but easily awakens with verbal cues. He is oriented x4 and c/o 10/10 back pain. Mr. Gates reports at baseline being independent with all adls and mobility, residing at home alone. He was educated on spinal precautions but demonstrated impulsivity with movements. He was able to bring self to sitting at EOB with Min A and vcs. Did report significant dizziness upon sitting up, BP checked and had dropped from 157/87 to 111/85. Attempted to work through dizziness but was unable to and returned back to [employer] in bed with Min A. BP returned to normal upon fowlers position in bed. Mr. Gates did not demonstrate any overt neuro deficits in his UEs throughout session. He would benefit from skilled oT services to address these deficits and assist with return to PLOF. Hopeful for d/c home with family when medically able, but will depend on his progress with therapy services.    Anticipated Discharge Disposition (OT): home with assist

## 2025-07-30 NOTE — THERAPY EVALUATION
Patient Name: Porfirio Gates  : 1955    MRN: [id number]                              Today's Date: 2025       Admit Date: 2025    Visit Dx:     ICD-10-CM ICD-9-CM   1. Impaired mobility [Z74.09]  Z74.09 799.89   2. 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    Chronic dyspnea    Pulmonary emphysema    Metastasis to spinal column    Metastatic cancer to spine     Past Medical History:   Diagnosis Date    Diabetes type 2     [place] (hard of hearing)     has hearing aids    Hypertension     Kidney stones      Past Surgical History:   Procedure Laterality Date    ADENOIDECTOMY      CARDIAC CATHETERIZATION N/A 2023    Procedure: Left Heart Cath;  Surgeon: Melvin Peterson MD;  Location: [facility] CATH INVASIVE LOCATION;  Service: Cardiology;  Laterality: N/A;    CHOLECYSTECTOMY      CYSTOSCOPY URETEROSCOPY LASER LITHOTRIPSY Left 3/10/2017    Procedure: CYSTOSCOPY URETEROSCOPY LASER LITHOTRIPSY;  Surgeon: Neftali Pereira MD;  Location: [facility] OR;  Service:     CYSTOSCOPY W/ URETERAL STENT PLACEMENT Left 3/10/2017    Procedure: CYSTOSCOPY URETERAL CATHETER/STENT INSERTION LEFT URETER;  Surgeon: Neftali Pereira MD;  Location:  [facility] OR;  Service:     LUMBAR BONE BIOPSY N/A 7/15/2025    Procedure: OPEN T11 BONE BIOPSY;  Surgeon: Endy Marte MD;  Location: [facility] OR;  Service: Neurosurgery;  Laterality: N/A;      General Information       Row Name 25 0815          Physical  Therapy Time and Intention    Document Type evaluation  Pt s/p posterior mentation T3-L3 with L1 corpectomy for lytic lesions to L1 and T11 with L1 suggesting canal compromise and violation of the posterior wall of the spinal column  -JAMES     Mode of Treatment physical therapy  -JAMES       Row Name 07/30/25 0815          General Information    Patient Profile Reviewed yes  -JAMES     Prior Level of Function independent:;all household mobility;ADL's  -JAMES     Existing Precautions/Restrictions fall;spinal  Mike drain  -JAMES     Barriers to Rehab medically complex  -JAMES       Row Name 07/30/25 0815          Living Environment    Current Living Arrangements home  -JAMES     People in Home alone  -JAMES       Row Name 07/30/25 0815          Cognition    Orientation Status (Cognition) oriented to;person;place;situation  -JAMES       Row Name 07/30/25 0815          Safety Issues/Impairments Affecting Functional Mobility    Safety Issues Affecting Function (Mobility) ability to follow commands;at risk behavior observed;awareness of need for assistance;impulsivity;insight into deficits/self-awareness;judgment;problem-solving;safety precaution awareness;safety precautions follow-through/compliance  -JAMES     Impairments Affecting Function (Mobility) strength;balance;endurance/activity tolerance;pain;cognition  Dizziness  -JAMES               User Key  (r) = Recorded By, (t) = Taken By, (c) = Cosigned By      Initials Name Provider Type    JAMES Rene Song, PT DPT Physical Therapist                   Mobility       Row Name 07/30/25 0815          Bed Mobility    Bed Mobility rolling left;sidelying-sit;sit-sidelying  -JAMES     Rolling Left [place] (Bed Mobility) minimum assist (75% patient effort)  -JAMES     Sidelying-Sit [place] (Bed Mobility) minimum assist (75% patient effort)  -JAMES     Sit-Sidelying [place] (Bed Mobility) minimum assist (75% patient effort)  -JAMES     Assistive Device (Bed Mobility) head of bed elevated;bed rails  -JAMES        Row Name 07/30/25 0815          Transfers    Comment, (Transfers) unable to tolerate/attempt due to worsening dizziness  -JAMES       Row Name 07/30/25 0815          Gait/Stairs (Locomotion)    Patient was able to Ambulate no, other medical factors prevent ambulation  -JAMES     Reason Patient was unable to Ambulate Dizziness;Hypotension  -JAMES               User Key  (r) = Recorded By, (t) = Taken By, (c) = Cosigned By      Initials Name Provider Type    Rene Jordan PT DPT Physical Therapist                   Obj/Interventions       Row Name 07/30/25 0815          Range of Motion Comprehensive    General Range of Motion bilateral lower extremity ROM WFL  -JAMES       Row Name 07/30/25 0815          Strength Comprehensive (MMT)    Comment, General Manual Muscle Testing (MMT) Assessment B LE grossly 4/5  -JAMES       [facility] Name 07/30/25 0815          Balance    Balance Assessment sitting dynamic balance;sitting static balance  -JAMES     Static Sitting Balance standby assist  -JAMES     Dynamic Sitting Balance contact guard;minimal assist  -JAMES     Position, Sitting Balance supported;sitting edge of bed  -JAMES       Row Name 07/30/25 0815          Sensory Assessment (Somatosensory)    Sensory Assessment (Somatosensory) LE sensation intact  -JAMES               User Key  (r) = Recorded By, (t) = Taken By, (c) = Cosigned By      Initials Name Provider Type    Rene Jordan PT DPT Physical Therapist                   Goals/Plan       Row Name 07/30/25 0815          Bed Mobility Goal 1 (PT)    Activity/Assistive Device (Bed Mobility Goal 1, PT) sidelying to sit/sit to sidelying  -JAMES     [place] Level/Cues Needed (Bed Mobility Goal 1, PT) supervision required  -JAMES     Time Frame (Bed Mobility Goal 1, PT) long term goal (LTG);10 days  -JAMES     Progress/Outcomes (Bed Mobility Goal 1, PT) new goal  -JAMES       [facility] Name 07/30/25 0815          Transfer Goal 1 (PT)    Activity/Assistive Device (Transfer Goal 1, PT)  sit-to-stand/stand-to-sit;bed-to-chair/chair-to-bed  -JAMES     [place] Level/Cues Needed (Transfer Goal 1, PT) supervision required  -JAMES     Time Frame (Transfer Goal 1, PT) long term goal (LTG);10 days  -JAMES     Progress/Outcome (Transfer Goal 1, PT) new goal  -JAMES       Row Name 07/30/25 0815          Gait Training Goal 1 (PT)    Activity/Assistive Device (Gait Training Goal 1, PT) gait (walking locomotion);decrease fall risk;improve balance and speed;increase endurance/gait distance  -JAMES     [place] Level (Gait Training Goal 1, PT) supervision required  -JAMES     Distance (Gait Training Goal 1, PT) 200 ft  -JAMES     Time Frame (Gait Training Goal 1, PT) long term goal (LTG);10 days  -JAMES     Progress/Outcome (Gait Training Goal 1, PT) new goal  -JAMES       Row Name 07/30/25 0815          Therapy Assessment/Plan (PT)    Planned Therapy Interventions (PT) bed mobility training;transfer training;gait training;balance training;home exercise program;patient/family education;postural re-education;stair training;strengthening  -JAMES               User Key  (r) = Recorded By, (t) = Taken By, (c) = Cosigned By      Initials Name Provider Type    Rene Jordan, PT DPT Physical Therapist                   Clinical Impression       Row Name 07/30/25 0815          Pain    Pretreatment Pain Rating 10/10  -JAMES     Pain Location back  -JAMES     Pain Management Interventions exercise or physical activity utilized  -JAMES       Row Name 07/30/25 0815          Plan of Care Review    Plan of Care Reviewed With patient;child  -JAMES     Outcome Evaluation PT eval complete. He was found in bed, daughter present. He reports being independent in his mobilty prior to admit. He was some impulsive today and needs cues for safety and safety recognition. Rates his back pain 10/10. HR was elevated in bed and RN was aware. RN was up to 157 before working with therapy. He required min assist for rolling in bed and for supine <> sit bed mobility. He  complains of worsening back pain and specifically dizziness once EOB. Was unable to tolerate fruther activity and was returned to bed. BP was monitored throughout as well. BP in bed was 157/87 and dropped to 111/85 once sitting EOB. Was later 150/89 once back in bed. HR down to 110 once back in bed. B LE ROM, sensation and strength appear WFL. PT will cont with mobility training, balance training and strengthening. I am hopeful he will recover and d.c home with assist. Consider some form of placement if unable to safely d.c home.  -JAMES       Row Name 07/30/25 0815          Therapy Assessment/Plan (PT)    Patient/Family Therapy Goals Statement (PT) decrease pain.  -JAMES     Rehab Potential (PT) good  -JAMES     Criteria for Skilled Interventions Met (PT) yes;meets criteria;skilled treatment is necessary  -JAMES     Therapy Frequency (PT) 2 times/day  -JAMES     Predicted Duration of Therapy Intervention (PT) until d.c  -JAMES       Row Name 07/30/25 0815          Vital Signs    Pre Systolic BP Rehab 157  -JAMES     Pre Treatment Diastolic BP 87  -JAMES     Intra Systolic BP Rehab 111  -JAMES     Intra Treatment Diastolic BP 85  -JAMES     Post Systolic BP Rehab 150  -JAMES     Post Treatment Diastolic BP 89  -JAMES     Pretreatment Heart Rate (beats/min) 157   -JAMES     Posttreatment Heart Rate (beats/min) 110  -JAMES     Pre Patient Position Supine  -JAMES     Intra Patient Position Sitting  -JAMES     Post Patient Position Supine  -JAMES       Row Name 07/30/25 0815          Positioning and Restraints    Pre-Treatment Position in bed  -JAMES     Post Treatment Position bed  -JAMES     In Bed notified nsg;fowlers;call light within reach;encouraged to call for assist;patient within staff view;with family/caregiver  -JAMES               User Key  (r) = Recorded By, (t) = Taken By, (c) = Cosigned By      Initials Name Provider Type    Rene Jordan, PT DPT Physical Therapist                   Outcome Measures       Row Name 07/30/25 0815 07/30/25 0800       How much  help from another person do you currently need...    Turning from your back to your side while in flat bed without using bedrails? 3  -JAMES 3  -SP    Moving from lying on back to sitting on the side of a flat bed without bedrails? 3  -JAMES 3  -SP    Moving to and from a bed to a chair (including a wheelchair)? 3  -JAMES 3  -SP    Standing up from a chair using your arms (e.g., wheelchair, bedside chair)? 3  -JAMES 3  -SP    Climbing 3-5 steps with a railing? 2  -JAMES 3  -SP    To walk in hospital room? 2  -JAMES 3  -SP    AM-PAC 6 Clicks Score (PT) 16  -JAMES 18  -SP    Highest Level of Mobility Goal Stand (1 or More Minutes)-5  -JAMES Walk 10 Steps or More-6  -SP      Row Name 07/30/25 0817 07/30/25 0815       Functional Assessment    Outcome Measure Options AM-PAC 6 Clicks Daily Activity (OT)  -UGRJIT AM-PAC 6 Clicks Basic Mobility (PT)  -JAMES              User Key  (r) = Recorded By, (t) = Taken By, (c) = Cosigned By      Initials Name Provider Type    Rene Jordan, PT DPT Physical Therapist    Nany Pineda, RN Registered Nurse    Tereza White, OTR/L, CSRS Occupational Therapist                                 Physical Therapy Education       Title: PT OT SLP Therapies (In Progress)       Topic: Physical Therapy (In Progress)       Point: Mobility training (In Progress)       Learning Progress Summary            Patient Acceptance, E, NR by JAMES at 7/30/2025 0815    Comment: Benefits of activity, progression of PT, log rolling, spinal prec                      Point: Home exercise program (Not Started)       Learner Progress:  Not documented in this visit.              Point: Body mechanics (Not Started)       Learner Progress:  Not documented in this visit.              Point: Precautions (In Progress)       Learning Progress Summary            Patient Acceptance, E, NR by JAMES at 7/30/2025 0815    Comment: Benefits of activity, progression of PT, log rolling, spinal prec                                      User Key        Initials Effective Dates Name Provider Type Discipline    JAMES 02/03/23 -  Rene Song PT DPT Physical Therapist PT                  PT Recommendation and Plan  Planned Therapy Interventions (PT): bed mobility training, transfer training, gait training, balance training, home exercise program, patient/family education, postural re-education, stair training, strengthening  Outcome Evaluation: PT eval complete. He was found in bed, daughter present. He reports being independent in his mobilty prior to admit. He was some impulsive today and needs cues for safety and safety recognition. Rates his back pain 10/10. HR was elevated in bed and RN was aware. RN was up to 157 before working with therapy. He required min assist for rolling in bed and for supine <> sit bed mobility. He complains of worsening back pain and specifically dizziness once EOB. Was unable to tolerate fruther activity and was returned to bed. BP was monitored throughout as well. BP in bed was 157/87 and dropped to 111/85 once sitting EOB. Was later 150/89 once back in bed. HR down to 110 once back in bed. B LE ROM, sensation and strength appear WFL. PT will cont with mobility training, balance training and strengthening. I am hopeful he will recover and d.c home with assist. Consider some form of placement if unable to safely d.c home.     Time Calculation:         PT Charges       Row Name 07/30/25 0907             Time Calculation    Start Time 0815  -JAMES      Stop Time 0850  + 5 minutes w chart review. 40 total minutes  -JAMES      Time Calculation (min) 35 min  -JAMES      PT Received On 07/30/25  -JAMES      PT Goal Re-Cert Due Date 08/09/25  -JAMES         Untimed Charges    PT Eval/Re-eval Minutes 40  -JAMES         Total Minutes    Untimed Charges Total Minutes 40  -JAMES       Total Minutes 40  -JAMES                User Key  (r) = Recorded By, (t) = Taken By, (c) = Cosigned By      Initials Name Provider Type    Rene Jordan PT DPT Physical  Therapist                  Therapy Charges for Today       Code Description Service Date Service Provider Modifiers Qty    [id number] HC PT EVAL MOD COMPLEXITY 3 7/30/2025 Rene Song, PT DPT GP 1            PT G-Codes  Outcome Measure Options: AM-PAC 6 Clicks Daily Activity (OT)  AM-PAC 6 Clicks Score (PT): 16  AM-PAC 6 Clicks Score (OT): 16  PT Discharge Summary  Anticipated Discharge Disposition (PT): home with 24/7 care, sub acute care setting, skilled nursing facility    Rene Song, PT DPT  7/30/2025

## 2025-07-31 ENCOUNTER — APPOINTMENT (OUTPATIENT)
Dept: CARDIOLOGY | Facility: HOSPITAL | Age: 70
End: 2025-07-31
Payer: MEDICARE

## 2025-07-31 ENCOUNTER — APPOINTMENT (OUTPATIENT)
Dept: GENERAL RADIOLOGY | Facility: HOSPITAL | Age: 70
End: 2025-07-31
Payer: MEDICARE

## 2025-07-31 PROBLEM — E78.5 HYPERLIPIDEMIA LDL GOAL <70: Status: ACTIVE | Noted: 2025-07-31

## 2025-07-31 PROBLEM — I35.0 NONRHEUMATIC AORTIC VALVE STENOSIS: Status: ACTIVE | Noted: 2025-07-31

## 2025-07-31 PROBLEM — I47.10 PAROXYSMAL SVT (SUPRAVENTRICULAR TACHYCARDIA): Status: ACTIVE | Noted: 2025-07-31

## 2025-07-31 PROBLEM — I48.91 NEW ONSET ATRIAL FIBRILLATION: Status: ACTIVE | Noted: 2025-07-31

## 2025-07-31 LAB
ALBUMIN SERPL-MCNC: 3.3 G/DL (ref 3.5–5.2)
ALBUMIN/GLOB SERPL: 1.1 G/DL
ALP SERPL-CCNC: 57 U/L (ref 39–117)
ALT SERPL W P-5'-P-CCNC: 21 U/L (ref 1–41)
ANION GAP SERPL CALCULATED.3IONS-SCNC: 13 MMOL/L (ref 5–15)
AST SERPL-CCNC: 59 U/L (ref 1–40)
BASOPHILS # BLD AUTO: 0.05 10*3/MM3 (ref 0–0.2)
BASOPHILS NFR BLD AUTO: 0.3 % (ref 0–1.5)
BILIRUB SERPL-MCNC: 0.6 MG/DL (ref 0–1.2)
BUN SERPL-MCNC: 14.8 MG/DL (ref 8–23)
BUN/CREAT SERPL: 16.6 (ref 7–25)
CALCIUM SPEC-SCNC: 8.7 MG/DL (ref 8.6–10.5)
CHLORIDE SERPL-SCNC: 105 MMOL/L (ref 98–107)
CO2 SERPL-SCNC: 24 MMOL/L (ref 22–29)
CREAT SERPL-MCNC: 0.89 MG/DL (ref 0.76–1.27)
DEPRECATED RDW RBC AUTO: 46.4 FL (ref 37–54)
EGFRCR SERPLBLD CKD-EPI 2021: 93.3 ML/MIN/1.73
EOSINOPHIL # BLD AUTO: 0.06 10*3/MM3 (ref 0–0.4)
EOSINOPHIL NFR BLD AUTO: 0.3 % (ref 0.3–6.2)
ERYTHROCYTE [DISTWIDTH] IN BLOOD BY AUTOMATED COUNT: 13.3 % (ref 12.3–15.4)
GLOBULIN UR ELPH-MCNC: 3 GM/DL
GLUCOSE BLDC GLUCOMTR-MCNC: 111 MG/DL (ref 70–130)
GLUCOSE BLDC GLUCOMTR-MCNC: 119 MG/DL (ref 70–130)
GLUCOSE BLDC GLUCOMTR-MCNC: 131 MG/DL (ref 70–130)
GLUCOSE BLDC GLUCOMTR-MCNC: 178 MG/DL (ref 70–130)
GLUCOSE SERPL-MCNC: 135 MG/DL (ref 65–99)
HCT VFR BLD AUTO: 31.1 % (ref 37.5–51)
HGB BLD-MCNC: 9.6 G/DL (ref 13–17.7)
IMM GRANULOCYTES # BLD AUTO: 0.12 10*3/MM3 (ref 0–0.05)
IMM GRANULOCYTES NFR BLD AUTO: 0.7 % (ref 0–0.5)
LAB AP CASE REPORT: NORMAL
LAB AP DIAGNOSIS COMMENT: NORMAL
LYMPHOCYTES # BLD AUTO: 1.9 10*3/MM3 (ref 0.7–3.1)
LYMPHOCYTES NFR BLD AUTO: 10.7 % (ref 19.6–45.3)
Lab: NORMAL
MCH RBC QN AUTO: 29.4 PG (ref 26.6–33)
MCHC RBC AUTO-ENTMCNC: 30.9 G/DL (ref 31.5–35.7)
MCV RBC AUTO: 95.4 FL (ref 79–97)
MONOCYTES # BLD AUTO: 1.36 10*3/MM3 (ref 0.1–0.9)
MONOCYTES NFR BLD AUTO: 7.6 % (ref 5–12)
NEUTROPHILS NFR BLD AUTO: 14.34 10*3/MM3 (ref 1.7–7)
NEUTROPHILS NFR BLD AUTO: 80.4 % (ref 42.7–76)
NRBC BLD AUTO-RTO: 0 /100 WBC (ref 0–0.2)
PATH REPORT.FINAL DX SPEC: NORMAL
PATH REPORT.GROSS SPEC: NORMAL
PLATELET # BLD AUTO: 200 10*3/MM3 (ref 140–450)
PMV BLD AUTO: 10.4 FL (ref 6–12)
POTASSIUM SERPL-SCNC: 3.3 MMOL/L (ref 3.5–5.2)
POTASSIUM SERPL-SCNC: 3.4 MMOL/L (ref 3.5–5.2)
PROT SERPL-MCNC: 6.3 G/DL (ref 6–8.5)
RBC # BLD AUTO: 3.26 10*6/MM3 (ref 4.14–5.8)
SODIUM SERPL-SCNC: 142 MMOL/L (ref 136–145)
WBC NRBC COR # BLD AUTO: 17.83 10*3/MM3 (ref 3.4–10.8)

## 2025-07-31 PROCEDURE — 25810000003 SODIUM CHLORIDE 0.9 % SOLUTION: Performed by: NURSE PRACTITIONER

## 2025-07-31 PROCEDURE — 63710000001 INSULIN LISPRO (HUMAN) PER 5 UNITS: Performed by: NURSE PRACTITIONER

## 2025-07-31 PROCEDURE — 94799 UNLISTED PULMONARY SVC/PX: CPT

## 2025-07-31 PROCEDURE — 82948 REAGENT STRIP/BLOOD GLUCOSE: CPT

## 2025-07-31 PROCEDURE — 93005 ELECTROCARDIOGRAM TRACING: CPT | Performed by: NURSE PRACTITIONER

## 2025-07-31 PROCEDURE — 25010000002 HEPARIN (PORCINE) PER 1000 UNITS: Performed by: NURSE PRACTITIONER

## 2025-07-31 PROCEDURE — 63710000001 INSULIN GLARGINE PER 5 UNITS: Performed by: NURSE PRACTITIONER

## 2025-07-31 PROCEDURE — 93306 TTE W/DOPPLER COMPLETE: CPT | Performed by: INTERNAL MEDICINE

## 2025-07-31 PROCEDURE — 97530 THERAPEUTIC ACTIVITIES: CPT

## 2025-07-31 PROCEDURE — 94761 N-INVAS EAR/PLS OXIMETRY MLT: CPT

## 2025-07-31 PROCEDURE — 99024 POSTOP FOLLOW-UP VISIT: CPT | Performed by: NURSE PRACTITIONER

## 2025-07-31 PROCEDURE — 93306 TTE W/DOPPLER COMPLETE: CPT

## 2025-07-31 PROCEDURE — 97110 THERAPEUTIC EXERCISES: CPT

## 2025-07-31 PROCEDURE — 97530 THERAPEUTIC ACTIVITIES: CPT | Performed by: OCCUPATIONAL THERAPIST

## 2025-07-31 PROCEDURE — 80053 COMPREHEN METABOLIC PANEL: CPT | Performed by: NURSE PRACTITIONER

## 2025-07-31 PROCEDURE — 85025 COMPLETE CBC W/AUTO DIFF WBC: CPT | Performed by: NURSE PRACTITIONER

## 2025-07-31 PROCEDURE — 84132 ASSAY OF SERUM POTASSIUM: CPT | Performed by: NEUROLOGICAL SURGERY

## 2025-07-31 PROCEDURE — 82948 REAGENT STRIP/BLOOD GLUCOSE: CPT | Performed by: NURSE PRACTITIONER

## 2025-07-31 RX ORDER — POTASSIUM CHLORIDE 1500 MG/1
40 TABLET, EXTENDED RELEASE ORAL EVERY 4 HOURS
Status: COMPLETED | OUTPATIENT
Start: 2025-07-31 | End: 2025-07-31

## 2025-07-31 RX ORDER — OXYCODONE AND ACETAMINOPHEN 10; 325 MG/1; MG/1
1 TABLET ORAL EVERY 4 HOURS PRN
Refills: 0 | Status: DISCONTINUED | OUTPATIENT
Start: 2025-07-31 | End: 2025-08-02 | Stop reason: HOSPADM

## 2025-07-31 RX ORDER — METOPROLOL TARTRATE 25 MG/1
12.5 TABLET, FILM COATED ORAL EVERY 12 HOURS SCHEDULED
Status: DISCONTINUED | OUTPATIENT
Start: 2025-07-31 | End: 2025-08-02 | Stop reason: HOSPADM

## 2025-07-31 RX ORDER — OXYCODONE AND ACETAMINOPHEN 7.5; 325 MG/1; MG/1
1 TABLET ORAL EVERY 4 HOURS PRN
Refills: 0 | Status: DISCONTINUED | OUTPATIENT
Start: 2025-07-31 | End: 2025-08-02 | Stop reason: HOSPADM

## 2025-07-31 RX ORDER — CYCLOBENZAPRINE HCL 10 MG
10 TABLET ORAL 3 TIMES DAILY PRN
Status: DISCONTINUED | OUTPATIENT
Start: 2025-07-31 | End: 2025-08-02 | Stop reason: HOSPADM

## 2025-07-31 RX ADMIN — METOPROLOL TARTRATE 12.5 MG: 25 TABLET, FILM COATED ORAL at 11:40

## 2025-07-31 RX ADMIN — Medication 20000 UNITS: at 09:26

## 2025-07-31 RX ADMIN — INSULIN LISPRO 1 UNITS: 100 INJECTION, SOLUTION INTRAVENOUS; SUBCUTANEOUS at 20:11

## 2025-07-31 RX ADMIN — Medication 1 APPLICATION: at 09:27

## 2025-07-31 RX ADMIN — INSULIN GLARGINE 21 UNITS: 100 INJECTION, SOLUTION SUBCUTANEOUS at 20:11

## 2025-07-31 RX ADMIN — POTASSIUM CHLORIDE 40 MEQ: 1500 TABLET, EXTENDED RELEASE ORAL at 11:40

## 2025-07-31 RX ADMIN — HEPARIN SODIUM 5000 UNITS: 5000 INJECTION INTRAVENOUS; SUBCUTANEOUS at 06:10

## 2025-07-31 RX ADMIN — INSULIN LISPRO 3 UNITS: 100 INJECTION, SOLUTION INTRAVENOUS; SUBCUTANEOUS at 13:39

## 2025-07-31 RX ADMIN — Medication 10 MG: at 20:11

## 2025-07-31 RX ADMIN — GABAPENTIN 100 MG: 100 CAPSULE ORAL at 20:11

## 2025-07-31 RX ADMIN — CYCLOBENZAPRINE HYDROCHLORIDE 10 MG: 10 TABLET, FILM COATED ORAL at 18:59

## 2025-07-31 RX ADMIN — POTASSIUM CHLORIDE 40 MEQ: 1500 TABLET, EXTENDED RELEASE ORAL at 06:11

## 2025-07-31 RX ADMIN — POLYETHYLENE GLYCOL 3350 17 G: 17 POWDER, FOR SOLUTION ORAL at 09:28

## 2025-07-31 RX ADMIN — OXYCODONE HYDROCHLORIDE AND ACETAMINOPHEN 1 TABLET: 7.5; 325 TABLET ORAL at 17:00

## 2025-07-31 RX ADMIN — METFORMIN HYDROCHLORIDE 500 MG: 500 TABLET, EXTENDED RELEASE ORAL at 18:37

## 2025-07-31 RX ADMIN — GABAPENTIN 100 MG: 100 CAPSULE ORAL at 09:26

## 2025-07-31 RX ADMIN — GABAPENTIN 100 MG: 100 CAPSULE ORAL at 16:17

## 2025-07-31 RX ADMIN — POTASSIUM CHLORIDE 40 MEQ: 1500 TABLET, EXTENDED RELEASE ORAL at 20:11

## 2025-07-31 RX ADMIN — ATORVASTATIN CALCIUM 40 MG: 40 TABLET, FILM COATED ORAL at 20:11

## 2025-07-31 RX ADMIN — Medication 220 MG: at 09:26

## 2025-07-31 RX ADMIN — CHLORHEXIDINE GLUCONATE 1 APPLICATION: 500 CLOTH TOPICAL at 09:28

## 2025-07-31 RX ADMIN — INSULIN LISPRO 7 UNITS: 100 INJECTION, SOLUTION INTRAVENOUS; SUBCUTANEOUS at 18:37

## 2025-07-31 RX ADMIN — LIDOCAINE 1 PATCH: 4 PATCH TOPICAL at 09:28

## 2025-07-31 RX ADMIN — SODIUM CHLORIDE 1000 ML: 9 INJECTION, SOLUTION INTRAVENOUS at 09:41

## 2025-07-31 RX ADMIN — Medication 10 ML: at 09:28

## 2025-07-31 RX ADMIN — INSULIN LISPRO 1 UNITS: 100 INJECTION, SOLUTION INTRAVENOUS; SUBCUTANEOUS at 09:28

## 2025-07-31 RX ADMIN — ASPIRIN 81 MG: 81 TABLET, COATED ORAL at 09:27

## 2025-07-31 RX ADMIN — POTASSIUM CHLORIDE 40 MEQ: 1500 TABLET, EXTENDED RELEASE ORAL at 16:17

## 2025-07-31 RX ADMIN — METFORMIN HYDROCHLORIDE 500 MG: 500 TABLET, EXTENDED RELEASE ORAL at 09:42

## 2025-07-31 RX ADMIN — OXYCODONE HYDROCHLORIDE AND ACETAMINOPHEN 1000 MG: 500 TABLET ORAL at 09:26

## 2025-07-31 RX ADMIN — TIZANIDINE 4 MG: 4 TABLET ORAL at 03:38

## 2025-07-31 RX ADMIN — Medication 1 APPLICATION: at 20:11

## 2025-07-31 RX ADMIN — HYDROCODONE BITARTRATE AND ACETAMINOPHEN 1 TABLET: 10; 325 TABLET ORAL at 03:38

## 2025-07-31 RX ADMIN — HEPARIN SODIUM 5000 UNITS: 5000 INJECTION INTRAVENOUS; SUBCUTANEOUS at 18:37

## 2025-07-31 RX ADMIN — DOCUSATE SODIUM 50 MG AND SENNOSIDES 8.6 MG 2 TABLET: 8.6; 5 TABLET, FILM COATED ORAL at 09:41

## 2025-07-31 RX ADMIN — METOPROLOL TARTRATE 12.5 MG: 25 TABLET, FILM COATED ORAL at 20:11

## 2025-07-31 RX ADMIN — Medication 10 ML: at 20:12

## 2025-07-31 NOTE — PROGRESS NOTES
NEUROSURGERY DAILY PROGRESS NOTE    ASSESSMENT:   Porfirio Gates is a 69 y.o. male with a significant comorbidity of known lung mass, hypertension, type 2 diabetes, class I obesity, pulmonary emphysema. He presents with a new problem of 7 out of 10 back pain. Physical exam findings of bilateral Babinski and hyperreflexia in his lower extremities with increased tone in his lower extremities as well as marked cervical kyphosis.  His imaging shows lytic lesions to L1 and T11 with L1 suggesting canal compromise and violation of the posterior wall of the spinal column on noncontrast CT.     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  Exam at baseline   2 Days Post-Op (7/29/2025) posterior mentation T3-L3 with L1 corpectomy   KATARZYNA = 265 mL, keep   PO x-rays pending   Neurochecks every 4 hours.  Call for decline   Remain in ICU    CV: Review of telemetry monitoring is abnormal with uncontrolled rate from  per my observation with questionable orthostatic hypotension.  EKG.  Consult cardiology for abnormal cardiac rhythm.  Orthostatic blood pressures.  Appreciate cardiology    Pulm: Maintaining O2 sat.  Continuous pulse oximetry.  Incentive spirometry.  Chest x-ray unremarkable.    : Voiding spontaneously.  Bladder scans and I/O cath per policy.   ENDO: Accu-Chek and treat per policy  FEN: Tolerating regular diet.  NS 1 L bolus  GI: No acute issues.    ID: 23-hour postoperative prophylactic antibiotics complete.  Wound healing protocol.  Heme:  DVT prophylaxis; SCD's.  Hemoglobin 9.6 from 7.9.    Pain: Tolerable with oral meds.  Dispo: PT/OT.       CHIEF COMPLAINT:   Back pain secondary to metastatic cancer    Subjective  Stable    Temp:  [98.5  "°F (36.9 °C)-99.2 °F (37.3 °C)] 99.2 °F (37.3 °C)  Heart Rate:  [] 126  Resp:  [15-21] 20  BP: ()/() 100/49    Objective:  Vital signs: (most recent): Blood pressure 143/76, pulse (!) 122, temperature 99.2 °F (37.3 °C), temperature source Oral, resp. rate 25, height 175 cm (68.9\"), weight 101 kg (223 lb 5.2 oz), SpO2 98%.      Neurological Exam  Mental Status  Awake, alert and oriented to person, place and time. Speech is normal. Language is fluent with no aphasia. Attention and concentration are normal.    Cranial Nerves  CN II: Visual acuity is normal.  CN III, IV, VI: Extraocular movements intact bilaterally. Normal lids and orbits bilaterally. Pupils equal round and reactive to light bilaterally.  CN V: Facial sensation is normal.  CN VII: Full and symmetric facial movement.  CN IX, X: Palate elevates symmetrically  CN XI: Shoulder shrug strength is normal.    Motor  Normal muscle bulk throughout. Normal muscle tone.                                               Right                     Left  Toe extension                        5                          5                                             Right                     Left  Deltoid                                   5                          5   Biceps                                   5                          5   Triceps                                  5                          5   Wrist extensor                       5                          5   Finger flexor                          5                          5   Iliopsoas                               5                          5   Quadriceps                           5                          5   Gastrocnemius                     5                           5   Anterior tibialis                      5                          5    Sensory  Light touch is normal in upper and lower extremities.     Coordination    Finger-to-nose, rapid alternating movements and heel-to-shin " normal bilaterally without dysmetria.    Gait    Did not assess.  Defer to PT.    Drains:   Closed/Suction Drain 1 Posterior Back Bulb 7 Fr. (Active)   Site Description Unable to view 07/30/25 0400   Dressing Status Clean;Intact;Dry 07/30/25 0400   Drainage Appearance Bloody 07/30/25 0400   Status To bulb suction 07/30/25 0400   Output (mL) 60 mL 07/30/25 0400       [REMOVED] Urethral Catheter Silicone 16 Fr. (Removed)   Daily Indications Required activity restriction from trauma, surgery, (e.g. unstable spine, fracture, hemodynamics) 07/30/25 0400   Site Assessment Skin intact;Clean 07/30/25 0400   Collection Container Standard drainage bag 07/30/25 0400   Securement Method Securing device 07/30/25 0400   Catheter care complete Yes 07/29/25 2009   Output (mL) 225 mL 07/30/25 0609       Output by Drain (mL) 07/30/25 0701 - 07/30/25 1900 07/30/25 1901 - 07/31/25 0700 07/31/25 0701 - 07/31/25 0912 Range Total   Closed/Suction Drain 1 Posterior Back Bulb 7 Fr. 160 105 30 295     Imaging Results (Last 24 Hours)       ** No results found for the last 24 hours. **          Lab Results (last 24 hours)       Procedure Component Value Units Date/Time    POC Glucose 4x Daily Before Meals & at Bedtime [[id number]]  (Abnormal) Collected: 07/31/25 0731    Specimen: Blood Updated: 07/31/25 0733     Glucose 131 mg/dL      Comment: Serial Number: [id number]:  [id number]       CBC Auto Differential [[id number]]  (Abnormal) Collected: 07/31/25 0639    Specimen: Blood Updated: 07/31/25 0709     WBC 17.83 10*3/mm3      RBC 3.26 10*6/mm3      Hemoglobin 9.6 g/dL      Hematocrit 31.1 %      MCV 95.4 fL      MCH 29.4 pg      MCHC 30.9 g/dL      RDW 13.3 %      RDW-SD 46.4 fl      MPV 10.4 fL      Platelets 200 10*3/mm3      Neutrophil % 80.4 %      Lymphocyte % 10.7 %      Monocyte % 7.6 %      Eosinophil % 0.3 %      Basophil % 0.3 %      Immature Grans % 0.7 %      Neutrophils, Absolute 14.34 10*3/mm3      Lymphocytes, Absolute 1.90  10*3/mm3      Monocytes, Absolute 1.36 10*3/mm3      Eosinophils, Absolute 0.06 10*3/mm3      Basophils, Absolute 0.05 10*3/mm3      Immature Grans, Absolute 0.12 10*3/mm3      nRBC 0.0 /100 WBC     Comprehensive Metabolic Panel [[id number]]  (Abnormal) Collected: 07/31/25 0416    Specimen: Blood Updated: 07/31/25 0548     Glucose 135 mg/dL      BUN 14.8 mg/dL      Creatinine 0.89 mg/dL      Sodium 142 mmol/L      Potassium 3.4 mmol/L      Chloride 105 mmol/L      CO2 24.0 mmol/L      Calcium 8.7 mg/dL      Total Protein 6.3 g/dL      Albumin 3.3 g/dL      ALT (SGPT) 21 U/L      AST (SGOT) 59 U/L      Alkaline Phosphatase 57 U/L      Total Bilirubin 0.6 mg/dL      Globulin 3.0 gm/dL      A/G Ratio 1.1 g/dL      BUN/Creatinine Ratio 16.6     Anion Gap 13.0 mmol/L      eGFR 93.3 mL/min/1.73     Narrative:      GFR Categories in Chronic Kidney Disease (CKD)              GFR Category          GFR (mL/min/1.73)    Interpretation  G1                    90 or greater        Normal or high (1)  G2                    60-89                Mild decrease (1)  G3a                   45-59                Mild to moderate decrease  G3b                   30-44                Moderate to severe decrease  G4                    15-29                Severe decrease  G5                    14 or less           Kidney failure    (1)In the absence of evidence of kidney disease, neither GFR category G1 or G2 fulfill the criteria for CKD.    eGFR calculation 2021 CKD-EPI creatinine equation, which does not include race as a factor    POC Glucose Once [[id number]]  (Normal) Collected: 07/30/25 2017    Specimen: Blood Updated: 07/30/25 2022     Glucose 122 mg/dL      Comment: Serial Number: [id number]:  [id number]       POC Glucose 4x Daily Before Meals & at Bedtime [[id number]]  (Normal) Collected: 07/30/25 1815    Specimen: Blood Updated: 07/30/25 1818     Glucose 127 mg/dL      Comment: Serial Number: [id number]:  [id number]        POC Glucose Once [[id number]]  (Abnormal) Collected: 07/30/25 1148    Specimen: Blood Updated: 07/30/25 1151     Glucose 141 mg/dL      Comment: Serial Number: [id number]:  [id number]             [id number]    Miguel Can, APRN

## 2025-07-31 NOTE — CONSULTS
[facility] HEART GROUP CONSULT NOTE    Referring Provider: Endy Marte, *    Reason for Consultation: Tachycardia    Chief complaint: Back pain      Subjective .     History of present illness:  Porfirio Gates is a 69 y.o. 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  He is being followed by pulmonology for workup of a right upper lobe lung mass.  He has nonocclusive coronary artery disease (60% stenosis of the mid LAD and 40 to 50% stenosis of the mid right coronary artery with normal RFR of the LAD on left heart catheterization 9/6/2023), type 2 diabetes mellitus, aortic valve stenosis, hypertension, hyperlipidemia, emphysema, tobacco use and obesity.  He was admitted to the intensive care unit following his spinal surgery on 7/29/2025.  He has had intermittent tachycardia on telemetry since admission, however heart rates this morning have remained consistently elevated in the 100s to 120s.  He has been hemodynamically stable.  Potassium was decreased at 3.4 this a.m. this is being treated with oral potassium replacement per his admitting team.  Cardiology service has been consulted for evaluation of irregular heart rhythm.  Patient denies chest pain, shortness of breath, palpitations, dizziness, syncope, orthopnea, PND, edema or decreased stamina.  Patient denies any signs of bleeding.    History  Past Medical History:   Diagnosis Date    Diabetes type 2     [place] (hard of hearing)     has hearing aids    Hypertension     Kidney stones    ,   Past Surgical History:   Procedure Laterality Date    ADENOIDECTOMY      CARDIAC CATHETERIZATION N/A 9/6/2023    Procedure: Left Heart Cath;  Surgeon: Melvin Peterson MD;  Location: [facility] CATH INVASIVE LOCATION;  Service: Cardiology;  Laterality: N/A;    CHOLECYSTECTOMY      CYSTOSCOPY URETEROSCOPY LASER LITHOTRIPSY Left 3/10/2017    Procedure: CYSTOSCOPY URETEROSCOPY  LASER LITHOTRIPSY;  Surgeon: Neftali Pereira MD;  Location:  [facility] OR;  Service:     CYSTOSCOPY W/ URETERAL STENT PLACEMENT Left 3/10/2017    Procedure: CYSTOSCOPY URETERAL CATHETER/STENT INSERTION LEFT URETER;  Surgeon: Neftali Pereira MD;  Location:  [facility] OR;  Service:     LUMBAR BONE BIOPSY N/A 7/15/2025    Procedure: OPEN T11 BONE BIOPSY;  Surgeon: Endy Marte MD;  Location:  [facility] OR;  Service: Neurosurgery;  Laterality: N/A;   ,   Family History   Problem Relation Age of Onset    No Known Problems Mother     Diabetes Father     Heart attack Father     Colon cancer Father     Cancer Brother    ,   Social History     Tobacco Use    Smoking status: Every Day     Current packs/day: 0.25     Types: Cigarettes     Passive exposure: Current    Smokeless tobacco: Former    Tobacco comments:     Report smokes half a pack on a daily -reported 6/25/25.    Vaping Use    Vaping status: Never Used   Substance Use Topics    Alcohol use: No    Drug use: No   ,     Medications  Current Facility-Administered Medications   Medication Dose Route Frequency Provider Last Rate Last Admin    ascorbic acid (VITAMIN C) tablet 1,000 mg  1,000 mg Oral Daily Miguel Can APRN   1,000 mg at 07/31/25 0926    aspirin EC tablet 81 mg  81 mg Oral Daily Miguel Can APRN   81 mg at 07/31/25 0927    atorvastatin (LIPITOR) tablet 40 mg  40 mg Oral Nightly Miguel Can APRN   40 mg at 07/30/25 2035    Calcium Replacement - Follow Nurse / BPA Driven Protocol   Not Applicable PRN Miguel Can APRN        Chlorhexidine Gluconate Cloth 2 % pads 1 Application  1 Application Topical Daily Endy Marte MD   1 Application at 07/31/25 0928    dextrose (D50W) (25 g/50 mL) IV injection 10-50 mL  10-50 mL Intravenous Q15 Min PRN Miguel Can APRN        dextrose (GLUTOSE) oral gel 15 g  15 g Oral Q15 Min PRN Miguel Can APRN        gabapentin (NEURONTIN) capsule 100 mg  100 mg Oral TID Miguel Can APRN   100 mg at  07/31/25 0926    Glucagon (GLUCAGEN) injection 1 mg  1 mg Intramuscular Q15 Min PRN Miguel Can APRN        heparin (porcine) 5000 UNIT/ML injection 5,000 Units  5,000 Units Subcutaneous Q12H Miguel Can APRN   5,000 Units at 07/31/25 0610    HYDROcodone-acetaminophen (NORCO)  MG per tablet 1 tablet  1 tablet Oral Q4H PRN Miguel Can APRN   1 tablet at 07/31/25 0338    HYDROcodone-acetaminophen (NORCO) 7.5-325 MG per tablet 1 tablet  1 tablet Oral Q4H PRN Miguel Can APRN        insulin glargine (LANTUS, SEMGLEE) injection 1-200 Units  1-200 Units Subcutaneous Nightly - Glucommander Miguel Can APRN   23 Units at 07/30/25 2035    insulin lispro (humaLOG) injection 1-200 Units  1-200 Units Subcutaneous 4x Daily With Meals & Nightly Miguel Can APRN   1 Units at 07/31/25 0928    insulin lispro (humaLOG) injection 1-200 Units  1-200 Units Subcutaneous PRN Miguel Can APRN        Lidocaine 4 % 1 patch  1 patch Transdermal Q24H Miguel Can APRN   1 patch at 07/31/25 0928    losartan (COZAAR) tablet 25 mg  25 mg Oral Daily Miguel Can APRN   25 mg at 07/30/25 0806    Magnesium Standard Dose Replacement - Follow Nurse / BPA Driven Protocol   Not Applicable PRN Miguel Can APRN        metFORMIN ER (GLUCOPHAGE-XR) 24 hr tablet 500 mg  500 mg Oral BID With Meals Miguel Can APRN   500 mg at 07/31/25 0942    metoprolol tartrate (LOPRESSOR) tablet 12.5 mg  12.5 mg Oral Q12H Monique Murguia APRN        mupirocin (BACTROBAN) 2 % nasal ointment 1 Application  1 Application Each Nare BID Endy Marte MD   1 Application at 07/31/25 0927    norepinephrine (LEVOPHED) 8 mg in 250 mL NS infusion (premix)  0.02-0.3 mcg/kg/min Intravenous Titrated Endy Marte MD        ondansetron ODT (ZOFRAN-ODT) disintegrating tablet 4 mg  4 mg Oral Q6H PRN Miguel Can APRN        Or    ondansetron (ZOFRAN) injection 4 mg  4 mg Intravenous Q6H PRN Miguel Can APRN        Phosphorus  Replacement - Follow Nurse / BPA Driven Protocol   Not Applicable PRN EmiltMiguel APRN        polyethylene glycol (MIRALAX) packet 17 g  17 g Oral Daily EmiltMiguel APRN   17 g at 07/31/25 0928    potassium chloride (KLOR-CON M20) CR tablet 40 mEq  40 mEq Oral Q4H Endy Marte MD   40 mEq at 07/31/25 0611    Potassium Replacement - Follow Nurse / BPA Driven Protocol   Not Applicable PRN Miguel Can APRN        sennosides-docusate (PERICOLACE) 8.6-50 MG per tablet 2 tablet  2 tablet Oral BID RustMiguel APRN   2 tablet at 07/31/25 0941    sodium chloride 0.9 % bolus 1,000 mL  1,000 mL Intravenous Once RusMiguel galeano APRN 1,000 mL/hr at 07/31/25 0941 1,000 mL at 07/31/25 0941    sodium chloride 0.9 % flush 10 mL  10 mL Intravenous Q12H Miguel Can APRN   10 mL at 07/31/25 0928    sodium chloride 0.9 % flush 10 mL  10 mL Intravenous PRN RustMiguel APRN        sodium chloride 0.9 % infusion 40 mL  40 mL Intravenous PRN Miguel Can APRN        [Held by provider] tiZANidine (ZANAFLEX) tablet 4 mg  4 mg Oral Q6H Endy Marte MD   4 mg at 07/31/25 0338    Vitamin A capsule 20,000 Units  20,000 Units Oral Daily EmiltMiguel APRN   20,000 Units at 07/31/25 0926    zinc sulfate (ZINCATE) capsule 220 mg  220 mg Oral Daily RustMiguel APRN   220 mg at 07/31/25 0926       Allergies:  Patient has no known allergies.    Review of Systems  Review of Systems   Constitutional: Negative for chills, decreased appetite, fever, malaise/fatigue, night sweats, weight gain and weight loss.   HENT:  Negative for nosebleeds.    Eyes:  Negative for visual disturbance.   Cardiovascular:  Negative for chest pain, dyspnea on exertion, leg swelling, near-syncope, orthopnea, palpitations, paroxysmal nocturnal dyspnea and syncope.   Respiratory:  Negative for cough, hemoptysis, shortness of breath, snoring and wheezing.    Endocrine: Negative for cold intolerance and heat intolerance.    Hematologic/Lymphatic: Does not bruise/bleed easily.   Skin:  Negative for rash.   Musculoskeletal:  Positive for back pain. Negative for falls.   Gastrointestinal:  Negative for abdominal pain, change in bowel habit, constipation, diarrhea, dysphagia, heartburn, nausea and vomiting.   Genitourinary:  Negative for hematuria.   Neurological:  Negative for dizziness, headaches, light-headedness and weakness.   Psychiatric/Behavioral:  Negative for altered mental status.    Allergic/Immunologic: Negative for persistent infections.       Objective     Physical Exam:  Patient Vitals for the past 24 hrs:   BP Temp Temp src Pulse Resp SpO2   07/31/25 0903 100/49 -- -- (!) 126 -- --   07/31/25 0901 108/81 -- -- (!) 125 -- (!) 80 %   07/31/25 0858 108/76 -- -- (!) 128 -- --   07/31/25 0854 110/77 -- -- 105 -- 98 %   07/31/25 0800 -- 99.2 °F (37.3 °C) Oral -- -- --   07/31/25 0700 120/73 -- -- 87 -- 92 %   07/31/25 0600 (!) 124/102 -- -- 105 20 95 %   07/31/25 0500 113/65 -- -- 102 20 96 %   07/31/25 0400 128/94 99.1 °F (37.3 °C) Oral 110 19 97 %   07/31/25 0300 108/71 -- -- 118 17 97 %   07/31/25 0200 103/69 -- -- 119 18 94 %   07/31/25 0100 126/82 -- -- 93 18 96 %   07/31/25 0000 117/79 98.5 °F (36.9 °C) Axillary 85 21 95 %   07/30/25 2300 117/82 -- -- 109 18 96 %   07/30/25 2200 145/99 -- -- 106 18 96 %   07/30/25 2100 127/79 -- -- (!) 132 18 98 %   07/30/25 2000 149/76 98.7 °F (37.1 °C) Oral (!) 123 15 94 %   07/30/25 1927 94/60 -- -- (!) 160 -- --   07/30/25 1900 (!) 159/108 -- -- 113 18 99 %   07/30/25 1800 (!) 144/101 -- -- (!) 137 -- 97 %   07/30/25 1700 144/86 -- -- 113 -- 98 %   07/30/25 1600 145/90 99 °F (37.2 °C) Oral 102 18 94 %   07/30/25 1456 102/64 -- -- (!) 139 -- 100 %   07/30/25 1400 143/87 -- -- (!) 135 -- 97 %   07/30/25 1300 141/78 -- -- 97 -- 95 %   07/30/25 1200 124/81 -- -- 117 -- 94 %   07/30/25 1100 123/67 -- -- 106 -- 96 %     Vitals and nursing note reviewed.   Constitutional:       General:  Awake.      Appearance: Normal and healthy appearance. Well-developed and not in distress. Obese.   Eyes:      General: Lids are normal.      Conjunctiva/sclera: Conjunctivae normal.      Pupils: Pupils are equal, round, and reactive to light.   HENT:      Head: Normocephalic and atraumatic.      Nose: Nose normal.   Neck:      Vascular: No JVR. JVD normal.   Pulmonary:      Effort: Pulmonary effort is normal.      Breath sounds: Normal breath sounds. No wheezing. No rhonchi. No rales.   Chest:      Chest wall: Not tender to palpatation.   Cardiovascular:      PMI at left midclavicular line. Tachycardia present. Irregular rhythm. Normal S1. Normal S2.       Murmurs: There is a grade 2/6 harsh midsystolic murmur at the URSB, radiating to the neck. There is a diastolic murmur.      No gallop.  No click. No rub.   Pulses:     Intact distal pulses.   Edema:     Peripheral edema absent.   Abdominal:      General: Bowel sounds are normal.      Palpations: Abdomen is soft.      Tenderness: There is no abdominal tenderness.   Musculoskeletal: Normal range of motion.         General: No tenderness.      Cervical back: Normal range of motion. Skin:     General: Skin is warm and dry.   Neurological:      General: No focal deficit present.      Mental Status: Alert, oriented to person, place, and time and oriented to person, place and time.   Psychiatric:         Attention and Perception: Attention and perception normal.         Mood and Affect: Mood and affect normal.         Speech: Speech normal.         Behavior: Behavior normal. Behavior is cooperative.         Thought Content: Thought content normal.         Cognition and Memory: Cognition and memory normal.         Judgment: Judgment normal.         Results Review:   I reviewed the patient's new clinical results.    Lab Results (last 72 hours)       Procedure Component Value Units Date/Time    POC Glucose 4x Daily Before Meals & at Bedtime [[id number]]  (Abnormal)  Collected: 07/31/25 0731    Specimen: Blood Updated: 07/31/25 0733     Glucose 131 mg/dL      Comment: Serial Number: [id number]:  [id number]       CBC Auto Differential [[id number]]  (Abnormal) Collected: 07/31/25 0639    Specimen: Blood Updated: 07/31/25 0709     WBC 17.83 10*3/mm3      RBC 3.26 10*6/mm3      Hemoglobin 9.6 g/dL      Hematocrit 31.1 %      MCV 95.4 fL      MCH 29.4 pg      MCHC 30.9 g/dL      RDW 13.3 %      RDW-SD 46.4 fl      MPV 10.4 fL      Platelets 200 10*3/mm3      Neutrophil % 80.4 %      Lymphocyte % 10.7 %      Monocyte % 7.6 %      Eosinophil % 0.3 %      Basophil % 0.3 %      Immature Grans % 0.7 %      Neutrophils, Absolute 14.34 10*3/mm3      Lymphocytes, Absolute 1.90 10*3/mm3      Monocytes, Absolute 1.36 10*3/mm3      Eosinophils, Absolute 0.06 10*3/mm3      Basophils, Absolute 0.05 10*3/mm3      Immature Grans, Absolute 0.12 10*3/mm3      nRBC 0.0 /100 WBC     Comprehensive Metabolic Panel [[id number]]  (Abnormal) Collected: 07/31/25 0416    Specimen: Blood Updated: 07/31/25 0548     Glucose 135 mg/dL      BUN 14.8 mg/dL      Creatinine 0.89 mg/dL      Sodium 142 mmol/L      Potassium 3.4 mmol/L      Chloride 105 mmol/L      CO2 24.0 mmol/L      Calcium 8.7 mg/dL      Total Protein 6.3 g/dL      Albumin 3.3 g/dL      ALT (SGPT) 21 U/L      AST (SGOT) 59 U/L      Alkaline Phosphatase 57 U/L      Total Bilirubin 0.6 mg/dL      Globulin 3.0 gm/dL      A/G Ratio 1.1 g/dL      BUN/Creatinine Ratio 16.6     Anion Gap 13.0 mmol/L      eGFR 93.3 mL/min/1.73     Narrative:      GFR Categories in Chronic Kidney Disease (CKD)              GFR Category          GFR (mL/min/1.73)    Interpretation  G1                    90 or greater        Normal or high (1)  G2                    60-89                Mild decrease (1)  G3a                   45-59                Mild to moderate decrease  G3b                   30-44                Moderate to severe decrease  G4                    15-29                 Severe decrease  G5                    14 or less           Kidney failure    (1)In the absence of evidence of kidney disease, neither GFR category G1 or G2 fulfill the criteria for CKD.    eGFR calculation 2021 CKD-EPI creatinine equation, which does not include race as a factor    POC Glucose Once [[id number]]  (Normal) Collected: 07/30/25 2017    Specimen: Blood Updated: 07/30/25 2022     Glucose 122 mg/dL      Comment: Serial Number: [id number]:  [id number]       POC Glucose 4x Daily Before Meals & at Bedtime [[id number]]  (Normal) Collected: 07/30/25 1815    Specimen: Blood Updated: 07/30/25 1818     Glucose 127 mg/dL      Comment: Serial Number: [id number]:  [id number]       POC Glucose Once [[id number]]  (Abnormal) Collected: 07/30/25 1148    Specimen: Blood Updated: 07/30/25 1151     Glucose 141 mg/dL      Comment: Serial Number: [id number]:  [id number]       Tissue Pathology Exam [[id number]] Collected: 07/29/25 1742    Specimen: Tissue from Back, Lower Updated: 07/30/25 0832    POC Glucose 4x Daily Before Meals & at Bedtime [[id number]]  (Normal) Collected: 07/30/25 0723    Specimen: Blood Updated: 07/30/25 0726     Glucose 125 mg/dL      Comment: Serial Number: [id number]:  [id number]       Comprehensive Metabolic Panel [[id number]]  (Abnormal) Collected: 07/30/25 0339    Specimen: Blood, Arterial Line Updated: 07/30/25 0416     Glucose 124 mg/dL      BUN 11.5 mg/dL      Creatinine 0.93 mg/dL      Sodium 140 mmol/L      Potassium 3.8 mmol/L      Chloride 108 mmol/L      CO2 24.0 mmol/L      Calcium 8.1 mg/dL      Total Protein 5.2 g/dL      Albumin 3.0 g/dL      ALT (SGPT) 22 U/L      AST (SGOT) 37 U/L      Alkaline Phosphatase 48 U/L      Total Bilirubin 0.5 mg/dL      Globulin 2.2 gm/dL      A/G Ratio 1.4 g/dL      BUN/Creatinine Ratio 12.4     Anion Gap 8.0 mmol/L      eGFR 89.4 mL/min/1.73     Narrative:      GFR Categories in Chronic Kidney Disease (CKD)               GFR Category          GFR (mL/min/1.73)    Interpretation  G1                    90 or greater        Normal or high (1)  G2                    60-89                Mild decrease (1)  G3a                   45-59                Mild to moderate decrease  G3b                   30-44                Moderate to severe decrease  G4                    15-29                Severe decrease  G5                    14 or less           Kidney failure    (1)In the absence of evidence of kidney disease, neither GFR category G1 or G2 fulfill the criteria for CKD.    eGFR calculation 2021 CKD-EPI creatinine equation, which does not include race as a factor    CBC Auto Differential [[id number]]  (Abnormal) Collected: 07/30/25 0339    Specimen: Blood, Arterial Line Updated: 07/30/25 0350     WBC 11.45 10*3/mm3      RBC 2.78 10*6/mm3      Hemoglobin 7.9 g/dL      Hematocrit 23.7 %      MCV 85.3 fL      MCH 28.4 pg      MCHC 33.3 g/dL      RDW 13.0 %      RDW-SD 40.2 fl      MPV 10.2 fL      Platelets 185 10*3/mm3      Neutrophil % 76.8 %      Lymphocyte % 14.8 %      Monocyte % 7.5 %      Eosinophil % 0.1 %      Basophil % 0.2 %      Immature Grans % 0.6 %      Neutrophils, Absolute 8.80 10*3/mm3      Lymphocytes, Absolute 1.69 10*3/mm3      Monocytes, Absolute 0.86 10*3/mm3      Eosinophils, Absolute 0.01 10*3/mm3      Basophils, Absolute 0.02 10*3/mm3      Immature Grans, Absolute 0.07 10*3/mm3      nRBC 0.0 /100 WBC     POC Glucose Once [[id number]]  (Abnormal) Collected: 07/29/25 2041    Specimen: Blood Updated: 07/29/25 2043     Glucose 163 mg/dL      Comment: Serial Number: [id number]:  [id number]       POC Glucose STAT [[id number]]  (Abnormal) Collected: 07/29/25 1836    Specimen: Blood Updated: 07/29/25 1838     Glucose 178 mg/dL      Comment: Serial Number: [id number]:  [id number]       Blood Gas, Arterial With Co-Ox [[id number]]  (Abnormal) Collected: 07/29/25 1727    Specimen: Arterial Blood Updated: 07/29/25  1726     Site Arterial Line     Joaquim's Test N/A     pH, Arterial 7.482 pH units      Comment: 83 Value above reference range        pCO2, Arterial 35.2 mm Hg      pO2, Arterial 240.0 mm Hg      Comment: 83 Value above reference range        HCO3, Arterial 26.3 mmol/L      Comment: 83 Value above reference range        Base Excess, Arterial 2.9 mmol/L      Comment: 83 Value above reference range        O2 Saturation, Arterial 100.0 %      Comment: 83 Value above reference range        Hemoglobin, Blood Gas 9.8 g/dL      Comment: 84 Value below reference range        Hematocrit, Blood Gas 30.2 %      Comment: 84 Value below reference range        Oxyhemoglobin 98.2 %      Methemoglobin 0.90 %      Carboxyhemoglobin 1.0 %      Temperature 37.0     Sodium, Arterial 140 mmol/L      Potassium, Arterial 4.0 mmol/L      Ionized Calcium 4.54 mg/dL      Comment: 84 Value below reference range        Barometric Pressure for Blood Gas 753 mmHg      Modality Ventilator     FIO2 100 %      Ventilator Mode NA     Collected by [facility] CRNA     Comment: Meter: [id number]     :  Mague Godoy, LUIS        pH, Temp Corrected 7.482 pH Units      Comment: 83 Value above reference range        pCO2, Temperature Corrected 35.2 mm Hg      pO2, Temperature Corrected 240 mm Hg      Comment: 83 Value above reference range       Arterial Blood Gas with Coox and Lactate [[id number]]  (Abnormal) Collected: 07/29/25 1625    Specimen: Arterial Blood Updated: 07/29/25 1624     pH, Arterial 7.500 pH units      Comment: 83 Value above reference range        pCO2, Arterial 33.6 mm Hg      Comment: 84 Value below reference range        pO2, Arterial --     HCO3, Arterial 26.2 mmol/L      Comment: 83 Value above reference range        Base Excess, Arterial 3.1 mmol/L      Comment: 83 Value above reference range        Hematocrit, Blood Gas 31.2 %      Comment: 84 Value below reference range        Hemoglobin, Blood Gas 10.2 g/dL      Comment:  "84 Value below reference range        O2 Saturation, Arterial >100.1 %      Comment: 93 Value above reportable range > 100.1        Oxyhemoglobin 98.0 %      Carboxyhemoglobin 1.1 %      Methemoglobin 1.10 %      Sodium, Arterial 141 mmol/L      Potassium, Arterial 3.7 mmol/L      Ionized Calcium 4.52 mg/dL      Comment: 84 Value below reference range        A-a DO2 --     Temperature 37.1     Barometric Pressure for Blood Gas 754 mmHg      pH, Temp Corrected 7.498 pH Units      Comment: 83 Value above reference range        pCO2, Temperature Corrected 33.8 mm Hg      Comment: 84 Value below reference range        pO2, Temperature Corrected 425 mm Hg      Comment: 83 Value above reference range        Glucose, Arterial 130 mg/dL      Comment: 83 Value above reference range        Site Arterial Line     Joaquim's Test N/A     Modality Ventilator     FIO2 100 %      Flow Rate --     Nitric Oxide --     Ventilator Mode NA     Set Tidal Volume --     Set Mech Resp Rate --     Rate --     PEEP --     PSV --     PIP --     IPAP --     EPAP --     CPAP --     Pulse Ox --     Notified Who --     Notified By --     Notified Time --     Collected by SURGERY     Comment: Meter: [id number]     :  [id number]        Note --     Lactate, whole blood 1.0 mmol/L     POC Glucose Once [[id number]]  (Abnormal) Collected: 07/29/25 1032    Specimen: Blood Updated: 07/29/25 1036     Glucose 134 mg/dL      Comment: Serial Number: [id number]:  [id number]               No results found for: \"ECHOEFEST\"    Imaging Results (Last 72 Hours)       Procedure Component Value Units Date/Time    FL O Arm During Surgery [[id number]] Collected: 07/29/25 1945     Updated: 07/29/25 1950    Narrative:      EXAMINATION: FL O ARM DURING SURGERY-  7/29/2025 7:46 PM     HISTORY: Fusion.     Fluoroscopy time: 6.17 seconds.     Dose: 15.61 mGy Cumulative Air Kerma. 2 image     FINDINGS: C arm was used intraoperatively as well as a portable CT " exam  during performance of fusion. Films available to the OR for review.     This report was signed and finalized on 7/29/2025 7:47 PM by Dr. Vasquez Meza MD.       FL C Arm During Surgery [[id number]] Resulted: 07/29/25 1938     Updated: 07/29/25 1938    Narrative:      This procedure was auto-finalized with no dictation required.          Assessment       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    Mass of upper lobe of right lung    Paroxysmal SVT (supraventricular tachycardia)    Type 2 diabetes mellitus with hyperglycemia, without long-term current use of insulin    Non-occlusive coronary artery disease    Pulmonary emphysema    Hypertension    Nonrheumatic aortic valve stenosis    Hyperlipidemia LDL goal <70    Tobacco use    Class 1 obesity due to excess calories with serious comorbidity and body mass index (BMI) of 33.0 to 33.9 in adult    Plan     1.  Paroxysmal supraventricular tachycardia-review of EKGs and telemetry reveal intermittent runs of supraventricular tachycardia with frequent PACs and some PVCs.  Start metoprolol tartrate 12.5 mg twice daily.  Continue to monitor.  Will hold off on full anticoagulation at this time unless clear atrial fibrillation or atrial flutter is seen on EKG or telemetry.  Check 2D echo.  2.  Nonocclusive coronary artery disease-60% stenosis of the mid LAD and 40 to 50% stenosis of the mid right coronary artery with normal RFR of the LAD on left heart catheterization 9/6/2023.  Patient has no ischemic symptoms.  Continue aspirin.  3.  Hypertension-blood pressures have been well-controlled.  Continue to monitor following above medication adjustment.  Continue losartan.  4.  Nonrheumatic aortic valve stenosis-mild on 2D echo in 2023.  Repeat 2D echo pending.  5.  Hyperlipidemia-continue atorvastatin.    Further orders per Dr. Peterson upon his evaluation of the patient.     Thank you for the consultation, cardiology will gladly continue to  follow.     KACEY Locke  7/31/2025  10:18 CDT      Please note this cardiology consultation note is the result of a face to face consultation with the patient, in addition to reviewing medical records at length by myself,  Electronically signed by KACEY Locke, 07/31/25, 9:24 AM CDT.         Time: 35 minutes

## 2025-07-31 NOTE — PLAN OF CARE
Goal Outcome Evaluation:  Plan of Care Reviewed With: patient, child        Progress: no change        A&OX4. Patient has not complained of pain until around 1700 and he received Percocet 7.5mg X1. Up to bedside commode. BM X2. UOP 650ml. KATARZYNA drainage 70ml. Runs of what appeared to be SVT, heart rate up to 140s. Metoprolol given this AM. This afternoon NS rhythm 90s with frequent PACs. Patient was wanting to leave AMA this afternoon but daughter was contacted as well as Miguel ERAZO to have a discussion with him. Patient agreed to stay.        Problem: Adult Inpatient Plan of Care  Goal: Plan of Care Review  Outcome: Progressing  Flowsheets (Taken 7/31/2025 1718)  Progress: no change  Plan of Care Reviewed With:   patient   child  Goal: Patient-Specific Goal (Individualized)  Outcome: Progressing  Goal: Absence of Hospital-Acquired Illness or Injury  Outcome: Progressing  Intervention: Identify and Manage Fall Risk  Recent Flowsheet Documentation  Taken 7/31/2025 1700 by Shital Gonzalez RN  Safety Promotion/Fall Prevention: safety round/check completed  Taken 7/31/2025 1500 by Shital Gonzalez RN  Safety Promotion/Fall Prevention: safety round/check completed  Taken 7/31/2025 1400 by Shital Gonzalez RN  Safety Promotion/Fall Prevention: safety round/check completed  Taken 7/31/2025 1300 by Shital Gonzalez RN  Safety Promotion/Fall Prevention: safety round/check completed  Taken 7/31/2025 1200 by Shital Gonzalez RN  Safety Promotion/Fall Prevention: safety round/check completed  Taken 7/31/2025 1100 by Shital Gonzalez RN  Safety Promotion/Fall Prevention: safety round/check completed  Taken 7/31/2025 1000 by Shital Gonzalez RN  Safety Promotion/Fall Prevention: safety round/check completed  Taken 7/31/2025 0900 by Shital Gonzalez RN  Safety Promotion/Fall Prevention: safety round/check completed  Taken 7/31/2025 0800 by Shital Gonzalez RN  Safety Promotion/Fall Prevention: safety round/check  completed  Intervention: Prevent Skin Injury  Recent Flowsheet Documentation  Taken 7/31/2025 1700 by Shital Gonzalez RN  Body Position: position changed independently  Taken 7/31/2025 1500 by Shital Gonzalez RN  Body Position: position changed independently  Taken 7/31/2025 1300 by Shital Gonzalez RN  Body Position: position changed independently  Taken 7/31/2025 1200 by Shital Gonzalez RN  Body Position: (Sitting up in chair) other (see comments)  Skin Protection:   incontinence pads utilized   silicone foam dressing in place  Taken 7/31/2025 1100 by Shital Gonzalez RN  Body Position: position changed independently  Taken 7/31/2025 0900 by Shital Gonzalez RN  Body Position: position changed independently  Taken 7/31/2025 0800 by Shital Gonzalez RN  Skin Protection:   incontinence pads utilized   silicone foam dressing in place  Intervention: Prevent and Manage VTE (Venous Thromboembolism) Risk  Recent Flowsheet Documentation  Taken 7/31/2025 1200 by Shital Gonzalez RN  VTE Prevention/Management: patient refused intervention  Taken 7/31/2025 0800 by Shital Gonzalez RN  VTE Prevention/Management:   bilateral   SCDs (sequential compression devices) on  Goal: Optimal Comfort and Wellbeing  Outcome: Progressing  Intervention: Monitor Pain and Promote Comfort  Recent Flowsheet Documentation  Taken 7/31/2025 1700 by Shital Gonzalez RN  Pain Management Interventions: pain medication given  Taken 7/31/2025 1400 by Shital Gonzalez RN  Pain Management Interventions:   pillow support provided   position adjusted   relaxation techniques promoted  Taken 7/31/2025 1200 by Shital Gonzalez RN  Pain Management Interventions:   position adjusted   pillow support provided  Intervention: Provide Person-Centered Care  Recent Flowsheet Documentation  Taken 7/31/2025 1200 by Shital Gonzalez RN  Trust Relationship/Rapport:   care explained   thoughts/feelings acknowledged  Taken 7/31/2025 0800 by  Shital Gonzalez RN  Trust Relationship/Rapport:   care explained   thoughts/feelings acknowledged  Goal: Readiness for Transition of Care  Outcome: Progressing     Problem: Skin Injury Risk Increased  Goal: Skin Health and Integrity  Outcome: Progressing  Intervention: Optimize Skin Protection  Recent Flowsheet Documentation  Taken 7/31/2025 1700 by Shital Gonzalez RN  Head of Bed ([facility]) Positioning: HOB at 30-45 degrees  Taken 7/31/2025 1500 by Shital Gonzalez RN  Head of Bed ([facility]) Positioning: HOB at 30-45 degrees  Taken 7/31/2025 1300 by Shital Gonzalez RN  Head of Bed ([facility]) Positioning: HOB at 30-45 degrees  Taken 7/31/2025 1200 by Shital Gonzalez RN  Activity Management: up in chair  Pressure Reduction Techniques:   frequent weight shift encouraged   heels elevated off bed   pressure points protected  Head of Bed (HOB) Positioning: HOB at 60-90 degrees  Pressure Reduction Devices: pressure-redistributing mattress utilized  Skin Protection:   incontinence pads utilized   silicone foam dressing in place  Taken 7/31/2025 1100 by Shital Gonzalez RN  Head of Bed ([facility]) Positioning: HOB at 20-30 degrees  Taken 7/31/2025 0900 by Shital Gonzalez RN  Head of Bed ([facility]) Positioning: HOB at 20-30 degrees  Taken 7/31/2025 0800 by Shital Gonzalez RN  Pressure Reduction Techniques:   frequent weight shift encouraged   heels elevated off bed   pressure points protected  Pressure Reduction Devices: pressure-redistributing mattress utilized  Skin Protection:   incontinence pads utilized   silicone foam dressing in place     Problem: Comorbidity Management  Goal: Blood Glucose Level Within Target Range  Outcome: Progressing     Problem: Fall Injury Risk  Goal: Absence of Fall and Fall-Related Injury  Outcome: Progressing  Intervention: Promote Injury-Free Environment  Recent Flowsheet Documentation  Taken 7/31/2025 1700 by Shital Gonzalez RN  Safety Promotion/Fall Prevention: safety round/check  completed  Taken 7/31/2025 1500 by Shital Gonzalez RN  Safety Promotion/Fall Prevention: safety round/check completed  Taken 7/31/2025 1400 by Shital Gonzalez RN  Safety Promotion/Fall Prevention: safety round/check completed  Taken 7/31/2025 1300 by Shital Gonzalez RN  Safety Promotion/Fall Prevention: safety round/check completed  Taken 7/31/2025 1200 by Shital Gonzlaez RN  Safety Promotion/Fall Prevention: safety round/check completed  Taken 7/31/2025 1100 by Shital Gonzalez RN  Safety Promotion/Fall Prevention: safety round/check completed  Taken 7/31/2025 1000 by Shital Gonzalez RN  Safety Promotion/Fall Prevention: safety round/check completed  Taken 7/31/2025 0900 by Shital Gonzalze RN  Safety Promotion/Fall Prevention: safety round/check completed  Taken 7/31/2025 0800 by Shital Gonzalez RN  Safety Promotion/Fall Prevention: safety round/check completed

## 2025-07-31 NOTE — PLAN OF CARE
Goal Outcome Evaluation:  Plan of Care Reviewed With: patient, family        Progress: improving  Outcome Evaluation: OT tx completed. Pt presented sidelying in bed upon entering and agreeable to participate in therapy. Pt was A&O x4. Orthostatics were taken during session. Pt's initial BP in sidelying was 124/64 with HR elevating up into the 140's. Following spinal precautions and implementing log rolling technique, pt transitioned from sidelying to sitting with Reny. Once in sitting, pt's BP was 109/64 with HR at 107. He was able to transition into standing with Reny, where his BP was 114/93 and HR at 111. Pt reported no dizziness during the transition to sitting or standing. Pt ambulated down hallway with CGA, however, reported feeling light headed during. Pt was returned to chair afterwards, and his BP was 129/74 with HR at 119 at the end of session. Mr. Gates would benefit from continued skilled OT per POC. Recommend d/c home with assist pending progress.    Anticipated Discharge Disposition (OT): home with assist

## 2025-07-31 NOTE — PLAN OF CARE
Goal Outcome Evaluation:  Plan of Care Reviewed With: patient        Progress: no change  Outcome Evaluation: Pt was in bed, agreed to therapy.  Performed LE exercises while in supine AROM BLE.  Transferred sidelying to sitting with CGA.  Transferred sit to stand with CGA/min assist.  Pt was unable to maintain standing longer than a few seconds and had to lay back down due to increase nausea and dizziness.  Orthostatics were taken.  In supine 129/74, sitting 109/64, then taken in supine after standing it was 89/53.  Will continue to work with pt to increase strength and progress amb.

## 2025-07-31 NOTE — THERAPY TREATMENT NOTE
Patient Name: Porfirio Gates  : 1955    MRN: [id number]                              Today's Date: 2025       Admit Date: 2025    Visit Dx:     ICD-10-CM ICD-9-CM   1. Impaired mobility [Z74.09]  Z74.09 799.89   2. 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    Chronic dyspnea    Pulmonary emphysema    Metastasis to spinal column    Metastatic cancer to spine    Paroxysmal SVT (supraventricular tachycardia)    Nonrheumatic aortic valve stenosis    Hyperlipidemia LDL goal <70     Past Medical History:   Diagnosis Date    Diabetes type 2     [place] (hard of hearing)     has hearing aids    Hypertension     Kidney stones      Past Surgical History:   Procedure Laterality Date    ADENOIDECTOMY      CARDIAC CATHETERIZATION N/A 2023    Procedure: Left Heart Cath;  Surgeon: Melvin Peterson MD;  Location:  [facility];  Service: Cardiology;  Laterality: N/A;    CHOLECYSTECTOMY      CYSTOSCOPY URETEROSCOPY LASER LITHOTRIPSY Left 3/10/2017    Procedure: CYSTOSCOPY URETEROSCOPY LASER LITHOTRIPSY;  Surgeon: Neftali Pereira MD;  Location:  [facility] OR;  Service:     CYSTOSCOPY W/ URETERAL STENT PLACEMENT Left 3/10/2017    Procedure: CYSTOSCOPY URETERAL CATHETER/STENT INSERTION LEFT URETER;  Surgeon: Neftali Pereira MD;  Location:  [facility] OR;  Service:     LUMBAR BONE BIOPSY N/A 7/15/2025    Procedure: OPEN T11 BONE BIOPSY;  Surgeon: Endy Marte MD;  Location:  [facility] OR;  Service:  Neurosurgery;  Laterality: N/A;      General Information       Row Name 07/31/25 1122          OT Time and Intention    Subjective Information no complaints  -JW (r) BS (t) JW (c)     Document Type therapy note (daily note)  -JW (r) BS (t) JW (c)     Mode of Treatment occupational therapy  -JW (r) BS (t) JW (c)     Patient Effort good  -JW (r) BS (t) JW (c)     Symptoms Noted During/After Treatment dizziness;increased pain  -JW (r) BS (t) JW (c)       Row Name 07/31/25 1122          General Information    Existing Precautions/Restrictions fall;spinal  -JW (r) BS (t) JW (c)     Barriers to Rehab medically complex  -JW (r) BS (t) JW (c)       Row Name 07/31/25 1122          Cognition    Orientation Status (Cognition) oriented x 4  -JW (r) BS (t) JW (c)       Row Name 07/31/25 1122          Safety Issues/Impairments Affecting Functional Mobility    Impairments Affecting Function (Mobility) strength;balance;endurance/activity tolerance;pain  -JW (r) BS (t) JW (c)               User Key  (r) = Recorded By, (t) = Taken By, (c) = Cosigned By      Initials Name Provider Type    Tereza White, OTR/L, CSRS Occupational Therapist    Yovana Wright, NAYELY Student OT Student                     Mobility/ADL's       Row Name 07/31/25 1122          Bed Mobility    Bed Mobility sidelying-sit  -JW (r) BS (t) JW (c)     Sidelying-Sit [place] (Bed Mobility) minimum assist (75% patient effort);1 person assist  -JW (r) BS (t) GURJIT (c)     Bed Mobility, Safety Issues decreased use of legs for bridging/pushing;decreased use of arms for pushing/pulling  -JW (r) BS (t) JW (c)     Assistive Device (Bed Mobility) bed rails  -JW (r) BS (t) JW (c)       Row Name 07/31/25 1122          Transfers    Transfers sit-stand transfer;stand-sit transfer  -JW (r) BS (t) JW (c)       Row Name 07/31/25 1122          Sit-Stand Transfer    Sit-Stand [place] (Transfers) minimum assist (75% patient effort);1 person assist  -JW (r) BS (t) GURJIT  (c)       Row Name 07/31/25 1122          Stand-Sit Transfer    Stand-Sit [place] (Transfers) contact guard;1 person assist  -JW (r) BS (t) JW (c)       Row Name 07/31/25 1122          Functional Mobility    Functional Mobility- Ind. Level contact guard assist;1 person  -JW (r) BS (t) JW (c)     Functional Mobility- Comment Pt ambulated in unit  -JW (r) BS (t) JW (c)     Patient was able to Ambulate yes  -JW (r) BS (t) JW (c)               User Key  (r) = Recorded By, (t) = Taken By, (c) = Cosigned By      Initials Name Provider Type    Tereza White OTR/L, CURTS Occupational Therapist    Yovana Wright, OT Student OT Student                   Obj/Interventions       Row Name 07/31/25 1122          Balance    Balance Assessment sitting static balance;sitting dynamic balance;sit to stand dynamic balance;standing static balance;standing dynamic balance  -JW (r) BS (t) JW (c)     Static Sitting Balance standby assist  -JW (r) BS (t) JW (c)     Dynamic Sitting Balance standby assist  -JW (r) BS (t) JW (c)     Position, Sitting Balance unsupported;sitting edge of bed  -JW (r) BS (t) GURJIT (c)     Static Standing Balance contact guard;1-person assist  -JW (r) BS (t) JW (c)               User Key  (r) = Recorded By, (t) = Taken By, (c) = Cosigned By      Initials Name Provider Type    GURJIT Tereza Olivares OTR/L, CURTS Occupational Therapist    Yovana Wright, OT Student OT Student                   Goals/Plan    No documentation.                  Clinical Impression       Row Name 07/31/25 1122          Pain Assessment    Pretreatment Pain Rating 10/10  -JW (r) BS (t) JW (c)     Posttreatment Pain Rating 10/10  -JW (r) BS (t) JW (c)     Pain Location back  -JW (r) BS (t) JW (c)     Pain Side/Orientation generalized  -JW (r) BS (t) JW (c)     Pain Management Interventions exercise or physical activity utilized  -JW (r) BS (t) JW (c)     Response to Pain Interventions activity participation with  increased pain  -JW (r) BS (t) JW (c)       Row Name 07/31/25 1122          Plan of Care Review    Plan of Care Reviewed With patient;family  -GURJIT (r) WESLEY (t) GURJIT (c)     Progress improving  -GURJIT (r) WESLEY (t) GURJIT (c)     Outcome Evaluation OT tx completed. Pt presented sidelying in bed upon entering and agreeable to participate in therapy. Pt was A&O x4. Orthostatics were taken during session. Pt's initial BP in sidelying was 124/64 with HR elevating up into the 140's. Following spinal precautions and implementing log rolling technique, pt transitioned from sidelying to sitting with Reny. Once in sitting, pt's BP was 109/64 with HR at 107. He was able to transition into standing with Reny, where his BP was 114/93 and HR at 111. Pt reported no dizziness during the transition to sitting or standing. Pt ambulated down hallway with CGA, however, reported feeling light headed during. Pt was returned to chair afterwards, and his BP was 129/74 with HR at 119 at the end of session. Mr. Gates would benefit from continued skilled OT per POC. Recommend d/c home with assist pending progress.  -JW (r) BS (t) JW (c)       Row Name 07/31/25 1122          Therapy Assessment/Plan (OT)    Rehab Potential (OT) good  -JW (r) BS (t) JW (c)       Row Name 07/31/25 1122          Therapy Plan Review/Discharge Plan (OT)    Anticipated Discharge Disposition (OT) home with assist  -GURJIT (r) WESLEY (t) GURJIT (c)       Row Name 07/31/25 1122          Vital Signs    O2 Delivery Pre Treatment room air  -JW (r) BS (t) JW (c)     O2 Delivery Intra Treatment room air  -JW (r) BS (t) JW (c)     O2 Delivery Post Treatment room air  -JW (r) BS (t) JW (c)     Pre Patient Position Supine  -JW (r) BS (t) JW (c)     Intra Patient Position Standing  -JW (r) BS (t) JW (c)     Post Patient Position Sitting  -JW (r) BS (t) JW (c)       Row Name 07/31/25 1122          Positioning and Restraints    Pre-Treatment Position in bed  -JW (r) BS (t) JW (c)     Post Treatment Position  chair  -JW (r) BS (t) JW (c)     In Chair sitting;call light within reach;encouraged to call for assist;with family/caregiver  -JW (r) BS (t) JW (c)               User Key  (r) = Recorded By, (t) = Taken By, (c) = Cosigned By      Initials Name Provider Type    Tereza White, OTR/L, CSRS Occupational Therapist    Yovana Wright, OT Student OT Student                   Outcome Measures       Row Name 07/31/25 1122          How much help from another is currently needed...    Putting on and taking off regular lower body clothing? 3  -JW (r) BS (t) JW (c)     Bathing (including washing, rinsing, and drying) 3  -JW (r) BS (t) JW (c)     Toileting (which includes using toilet bed pan or urinal) 3  -JW (r) BS (t) JW (c)     Putting on and taking off regular upper body clothing 3  -JW (r) BS (t) JW (c)     Taking care of personal grooming (such as brushing teeth) 3  -JW (r) BS (t) JW (c)     Eating meals 4  -JW (r) BS (t) JW (c)     AM-PAC 6 Clicks Score (OT) 19  -JW (r) BS (t)       Row Name 07/31/25 0800          How much help from another person do you currently need...    Turning from your back to your side while in flat bed without using bedrails? 4  -IB     Moving from lying on back to sitting on the side of a flat bed without bedrails? 3  -IB     Moving to and from a bed to a chair (including a wheelchair)? 3  -IB     Standing up from a chair using your arms (e.g., wheelchair, bedside chair)? 3  -IB     Climbing 3-5 steps with a railing? 3  -IB     To walk in hospital room? 2  -IB     AM-PAC 6 Clicks Score (PT) 18  -IB     Highest Level of Mobility Goal Walk 10 Steps or More-6  -IB       Row Name 07/31/25 1122          Functional Assessment    Outcome Measure Options AM-PAC 6 Clicks Daily Activity (OT)  -JW (r) BS (t) JW (c)               User Key  (r) = Recorded By, (t) = Taken By, (c) = Cosigned By      Initials Name Provider Type    Tereza White, OTR/L, CSRS Occupational Therapist    IB  Shital Gonzalez, RN Registered Nurse    Yovana Wright, OT Student OT Student                    Occupational Therapy Education       Title: PT OT SLP Therapies (In Progress)       Topic: Occupational Therapy (Done)       Point: ADL training (Done)       Learning Progress Summary            Patient Acceptance, E, VU by BS at 7/31/2025 1122    Comment: OT POC; spinal precautions; log rolling   Family Acceptance, E, VU by BS at 7/31/2025 1122    Comment: OT POC; spinal precautions; log rolling                      Point: Precautions (Done)       Learning Progress Summary            Patient Acceptance, E, VU by BS at 7/31/2025 1122    Comment: OT POC; spinal precautions; log rolling   Family Acceptance, E, VU by BS at 7/31/2025 1122    Comment: OT POC; spinal precautions; log rolling                      Point: Body mechanics (Done)       Learning Progress Summary            Patient Acceptance, E, VU by BS at 7/31/2025 1122    Comment: OT POC; spinal precautions; log rolling   Family Acceptance, E, VU by BS at 7/31/2025 1122    Comment: OT POC; spinal precautions; log rolling                                      User Key       Initials Effective Dates Name Provider Type Discipline    WESLEY 05/12/25 -  Yovana Olea, OT Student OT Student OT                  OT Recommendation and Plan     Plan of Care Review  Plan of Care Reviewed With: patient, family  Progress: improving  Outcome Evaluation: OT tx completed. Pt presented sidelying in bed upon entering and agreeable to participate in therapy. Pt was A&O x4. Orthostatics were taken during session. Pt's initial BP in sidelying was 124/64 with HR elevating up into the 140's. Following spinal precautions and implementing log rolling technique, pt transitioned from sidelying to sitting with Reny. Once in sitting, pt's BP was 109/64 with HR at 107. He was able to transition into standing with Reny, where his BP was 114/93 and HR at 111. Pt reported no dizziness during the  transition to sitting or standing. Pt ambulated down hallway with CGA, however, reported feeling light headed during. Pt was returned to chair afterwards, and his BP was 129/74 with HR at 119 at the end of session. Mr. Gates would benefit from continued skilled OT per POC. Recommend d/c home with assist pending progress.     Time Calculation:         Time Calculation- OT       Row Name 07/31/25 1122 07/31/25 1115          Time Calculation- OT    OT Start Time 1122  add 7 minutes for first attempt  -JW (r) BS (t) JW (c) --  -JW (r) BS (t) JW (c)     OT Stop Time 1139  -JW (r) BS (t) JW (c) --  -JW (r) BS (t) JW (c)     OT Time Calculation (min) 17 min  -JW (r) BS (t) -- (P)   -BS     Total Timed Code Minutes- OT 24 minute(s)  -JW (r) BS (t) JW (c) --  -JW (r) BS (t) JW (c)     OT Received On 07/31/25  -JW (r) BS (t) JW (c) --  -JW (r) BS (t) JW (c)        Timed Charges    83999 - OT Therapeutic Activity Minutes 24  -JW (r) BS (t) JW (c) --  -JW (r) BS (t) JW (c)        Total Minutes    Timed Charges Total Minutes 24  -JW (r) BS (t) -- (P)   -BS      Total Minutes 24  -JW (r) BS (t) -- (P)   -BS               User Key  (r) = Recorded By, (t) = Taken By, (c) = Cosigned By      Initials Name Provider Type    Tereza White OTR/L, CSRS Occupational Therapist    Yovana Wright, OT Student OT Student                           Yovana Olea OT Student  7/31/2025

## 2025-07-31 NOTE — PLAN OF CARE
Goal Outcome Evaluation:   Pt AXOX4. Awaiting floor bed. Pain better controlled through the night. Plan of care continues.

## 2025-07-31 NOTE — PLAN OF CARE
Goal Outcome Evaluation:  Plan of Care Reviewed With: patient        Progress: no change       Problem: Adult Inpatient Plan of Care  Goal: Plan of Care Review  Outcome: Progressing  Flowsheets (Taken 7/30/2025 1918)  Progress: no change  Plan of Care Reviewed With: patient  Goal: Patient-Specific Goal (Individualized)  Outcome: Progressing  Goal: Absence of Hospital-Acquired Illness or Injury  Outcome: Progressing  Intervention: Identify and Manage Fall Risk  Recent Flowsheet Documentation  Taken 7/30/2025 1800 by Nina Bower RN  Safety Promotion/Fall Prevention: safety round/check completed  Taken 7/30/2025 1700 by Nina Bower RN  Safety Promotion/Fall Prevention: safety round/check completed  Taken 7/30/2025 1600 by Nina Bower RN  Safety Promotion/Fall Prevention: safety round/check completed  Intervention: Prevent Skin Injury  Recent Flowsheet Documentation  Taken 7/30/2025 1600 by Nina Bower RN  Body Position:   position changed independently   side-lying   left  Skin Protection: silicone foam dressing in place  Intervention: Prevent and Manage VTE (Venous Thromboembolism) Risk  Recent Flowsheet Documentation  Taken 7/30/2025 1600 by Nina Bower RN  VTE Prevention/Management: patient refused intervention  Goal: Optimal Comfort and Wellbeing  Outcome: Progressing  Intervention: Provide Person-Centered Care  Recent Flowsheet Documentation  Taken 7/30/2025 1600 by Nina Bower RN  Trust Relationship/Rapport: care explained  Goal: Readiness for Transition of Care  Outcome: Progressing

## 2025-07-31 NOTE — THERAPY TREATMENT NOTE
Acute Care - Physical Therapy Treatment Note  [facility]     Patient Name: Porfirio Gates  : 1955  MRN: [id number]  Today's Date: 2025      Visit Dx:     ICD-10-CM ICD-9-CM   1. Impaired mobility [Z74.09]  Z74.09 799.89   2. 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    Chronic dyspnea    Pulmonary emphysema    Metastasis to spinal column    Metastatic cancer to spine    Paroxysmal SVT (supraventricular tachycardia)    Nonrheumatic aortic valve stenosis    Hyperlipidemia LDL goal <70     Past Medical History:   Diagnosis Date    Diabetes type 2     [place] (hard of hearing)     has hearing aids    Hypertension     Kidney stones      Past Surgical History:   Procedure Laterality Date    ADENOIDECTOMY      CARDIAC CATHETERIZATION N/A 2023    Procedure: Left Heart Cath;  Surgeon: Melvin Peterson MD;  Location: [facility] CATH INVASIVE LOCATION;  Service: Cardiology;  Laterality: N/A;    CHOLECYSTECTOMY      CYSTOSCOPY URETEROSCOPY LASER LITHOTRIPSY Left 3/10/2017    Procedure: CYSTOSCOPY URETEROSCOPY LASER LITHOTRIPSY;  Surgeon: Neftali Pereira MD;  Location: [facility] OR;  Service:     CYSTOSCOPY W/ URETERAL STENT PLACEMENT Left 3/10/2017    Procedure: CYSTOSCOPY URETERAL CATHETER/STENT INSERTION LEFT URETER;  Surgeon: Neftali Pereira MD;  Location: [facility] OR;  Service:     LUMBAR BONE BIOPSY N/A 7/15/2025    Procedure: OPEN T11 BONE BIOPSY;  Surgeon: Endy Marte MD;  Location: [facility] OR;   Service: Neurosurgery;  Laterality: N/A;     PT Assessment (Last 12 Hours)       PT Evaluation and Treatment       Row Name 07/31/25 1334          Physical Therapy Time and Intention    Subjective Information complains of;weakness;fatigue;dizziness  -     Document Type therapy note (daily note)  -     Mode of Treatment physical therapy  -       Row Name 07/31/25 1334          General Information    Existing Precautions/Restrictions fall;spinal;orthostatic hypotension  -       Row Name 07/31/25 1334          Pain    Pretreatment Pain Rating 4/10  -     Posttreatment Pain Rating 4/10  -     Pain Location back  -MARLEY     Pain Side/Orientation generalized  -     Pain Management Interventions exercise or physical activity utilized  -     Response to Pain Interventions activity participation with tolerable pain  -       Row Name 07/31/25 1334          Bed Mobility    Sidelying-Sit [place] (Bed Mobility) verbal cues;contact guard  -     Sit-Sidelying [place] (Bed Mobility) verbal cues;contact guard  -     Assistive Device (Bed Mobility) bed rails  -       Row Name 07/31/25 1334          Transfers    Transfers sit-stand transfer;stand-sit transfer  -     Comment, (Transfers) pt only able to maintain standing a few seconds before having to lay back down due to nausea and increase dizziness  -       Row Name 07/31/25 1334          Sit-Stand Transfer    Sit-Stand [place] (Transfers) verbal cues;minimum assist (75% patient effort)  -       Row Name 07/31/25 1334          Stand-Sit Transfer    Stand-Sit [place] (Transfers) verbal cues;minimum assist (75% patient effort)  -       Row Name 07/31/25 1334          Motor Skills    Comments, Therapeutic Exercise in fowlers, AROM BLE X 15  -MARLEY     Additional Documentation Comments, Therapeutic Exercise (Row)  -       Row Name             Wound 07/29/25 1236 thoracic spine Surgical Closed Surgical Incision    Wound - Properties Group  Placement Date: 07/29/25  -SY Placement Time: 1236  -SY Present on Original Admission: N  -SY Location: thoracic spine  -SY Primary Wound Type: Surgical  -SY Secondary Wound Type - Surgical: Closed Surgi  -SY    Retired Wound - Properties Group Placement Date: 07/29/25  -SY Placement Time: 1236  -SY Present on Original Admission: N  -SY Location: thoracic spine  -SY    Retired Wound - Properties Group Placement Date: 07/29/25  -SY Placement Time: 1236  -SY Present on Original Admission: N  -SY Location: thoracic spine  -SY    Retired Wound - Properties Group Date first assessed: 07/29/25  -SY Time first assessed: 1236  -SY Present on Original Admission: N  -SY Location: thoracic spine  -SY      Row Name 07/31/25 1334          Vital Signs    Pre Systolic BP Rehab 129  -MARLEY     Pre Treatment Diastolic BP 74  -MARLEY     Intra Systolic BP Rehab 109  -MARLEY     Intra Treatment Diastolic BP 64  -MALREY     Post Systolic BP Rehab 89   -MARLEY     Post Treatment Diastolic BP 53   -MARLEY     Pre Patient Position Supine  -MARLEY     Intra Patient Position Sitting  -MARLEY     Post Patient Position Supine   after standing  -MARLEY       Row Name 07/31/25 1334          Positioning and Restraints    Pre-Treatment Position in bed  -MARLEY     Post Treatment Position bed  -MARLEY     In Bed side lying left;call light within reach;encouraged to call for assist;side rails up x2  -MARLEY               User Key  (r) = Recorded By, (t) = Taken By, (c) = Cosigned By      Initials Name Provider Type    Shad Perrin PTA Physical Therapist Assistant    Kristie Rubalcava RN Registered Nurse                    Physical Therapy Education       Title: PT OT SLP Therapies (In Progress)       Topic: Physical Therapy (In Progress)       Point: Mobility training (In Progress)       Learning Progress Summary            Patient Acceptance, E, NR by JAMES at 7/30/2025 0815    Comment: Benefits of activity, progression of PT, log rolling, spinal prec                      Point: Home  exercise program (Not Started)       Learner Progress:  Not documented in this visit.              Point: Body mechanics (Not Started)       Learner Progress:  Not documented in this visit.              Point: Precautions (In Progress)       Learning Progress Summary            Patient Acceptance, E, NR by JAMES at 7/30/2025 0815    Comment: Benefits of activity, progression of PT, log rolling, spinal prec                                      User Key       Initials Effective Dates Name Provider Type Discipline    JAMES 02/03/23 -  Rene Song, PT DPT Physical Therapist PT                  PT Recommendation and Plan     Progress: no change  Outcome Evaluation: Pt was in bed, agreed to therapy.  Performed LE exercises while in supine AROM BLE.  Transferred sidelying to sitting with CGA.  Transferred sit to stand with CGA/min assist.  Pt was unable to maintain standing longer than a few seconds and had to lay back down due to increase nausea and dizziness.  Orthostatics were taken.  In supine 129/74, sitting 109/64, then taken in supine after standing it was 89/53.  Will continue to work with pt to increase strength and progress amb.   Outcome Measures       Row Name 07/31/25 3554             How much help from another person do you currently need...    Turning from your back to your side while in flat bed without using bedrails? 3  -MARLEY      Moving from lying on back to sitting on the side of a flat bed without bedrails? 3  -MARLEY      Moving to and from a bed to a chair (including a wheelchair)? 3  -MARLEY      Standing up from a chair using your arms (e.g., wheelchair, bedside chair)? 3  -MARLEY      Climbing 3-5 steps with a railing? 2  -MARLEY      To walk in hospital room? 3  -MARLEY      AM-PAC 6 Clicks Score (PT) 17  -MARLEY         Functional Assessment    Outcome Measure Options AM-PAC 6 Clicks Basic Mobility (PT)  -MARLEY                User Key  (r) = Recorded By, (t) = Taken By, (c) = Cosigned By      Initials Name Provider Type     Shad Perrin PTA Physical Therapist Assistant                     Time Calculation:    PT Charges       Row Name 07/31/25 1334             Time Calculation    Start Time 1334  -MARLEY      Stop Time 1358  -MARLEY      Time Calculation (min) 24 min  -MARLEY      PT Received On 07/31/25  -MARLEY         Time Calculation- PT    Total Timed Code Minutes- PT 24 minute(s)  -MARLEY         Timed Charges    88864 - PT Therapeutic Exercise Minutes 14  -MARLEY      03775 - PT Therapeutic Activity Minutes 10  -MARLEY         Total Minutes    Timed Charges Total Minutes 24  -MARLEY       Total Minutes 24  -MARLEY                User Key  (r) = Recorded By, (t) = Taken By, (c) = Cosigned By      Initials Name Provider Type    Shad Perrin PTA Physical Therapist Assistant                  Therapy Charges for Today       Code Description Service Date Service Provider Modifiers Qty    50636755962 HC PT THERAPEUTIC ACT EA 15 MIN 7/31/2025 Shad Mcghee PTA GP 1    70778128608 HC PT THER PROC EA 15 MIN 7/31/2025 Shad Mcghee PTA GP 1            PT G-Codes  Outcome Measure Options: AM-PAC 6 Clicks Basic Mobility (PT)  AM-PAC 6 Clicks Score (PT): 17  AM-PAC 6 Clicks Score (OT): 19    Shad Mcghee PTA  7/31/2025

## 2025-08-01 ENCOUNTER — APPOINTMENT (OUTPATIENT)
Dept: GENERAL RADIOLOGY | Facility: HOSPITAL | Age: 70
End: 2025-08-01
Payer: MEDICARE

## 2025-08-01 LAB
ALBUMIN SERPL-MCNC: 3.1 G/DL (ref 3.5–5.2)
ALBUMIN/GLOB SERPL: 1.1 G/DL
ALP SERPL-CCNC: 52 U/L (ref 39–117)
ALT SERPL W P-5'-P-CCNC: 21 U/L (ref 1–41)
ANION GAP SERPL CALCULATED.3IONS-SCNC: 9 MMOL/L (ref 5–15)
AORTIC DIMENSIONLESS INDEX: 0.4 (DI)
AST SERPL-CCNC: 52 U/L (ref 1–40)
AV MEAN PRESS GRAD SYS DOP V1V2: 19.7 MMHG
AV VMAX SYS DOP: 339 CM/SEC
BASOPHILS # BLD AUTO: 0.05 10*3/MM3 (ref 0–0.2)
BASOPHILS NFR BLD AUTO: 0.3 % (ref 0–1.5)
BH CV ECHO MEAS - AO MAX PG: 46 MMHG
BH CV ECHO MEAS - AO ROOT DIAM: 2.4 CM
BH CV ECHO MEAS - AO V2 VTI: 49.7 CM
BH CV ECHO MEAS - AVA(I,D): 1.14 CM2
BH CV ECHO MEAS - EDV(CUBED): 79 ML
BH CV ECHO MEAS - EDV(MOD-SP2): 65.6 ML
BH CV ECHO MEAS - EDV(MOD-SP4): 79.9 ML
BH CV ECHO MEAS - EF(MOD-SP2): 65.1 %
BH CV ECHO MEAS - EF(MOD-SP4): 77 %
BH CV ECHO MEAS - ESV(CUBED): 4 ML
BH CV ECHO MEAS - ESV(MOD-SP2): 22.9 ML
BH CV ECHO MEAS - ESV(MOD-SP4): 18.4 ML
BH CV ECHO MEAS - FS: 62.9 %
BH CV ECHO MEAS - IVS/LVPW: 1.18 CM
BH CV ECHO MEAS - IVSD: 1.27 CM
BH CV ECHO MEAS - LA DIMENSION: 4 CM
BH CV ECHO MEAS - LAT PEAK E' VEL: 6.6 CM/SEC
BH CV ECHO MEAS - LV DIASTOLIC VOL/BSA (35-75): 37.3 CM2
BH CV ECHO MEAS - LV MASS(C)D: 178.5 GRAMS
BH CV ECHO MEAS - LV MAX PG: 3.8 MMHG
BH CV ECHO MEAS - LV MEAN PG: 2 MMHG
BH CV ECHO MEAS - LV SYSTOLIC VOL/BSA (12-30): 8.6 CM2
BH CV ECHO MEAS - LV V1 MAX: 97.8 CM/SEC
BH CV ECHO MEAS - LV V1 VTI: 20 CM
BH CV ECHO MEAS - LVIDD: 4.3 CM
BH CV ECHO MEAS - LVIDS: 1.59 CM
BH CV ECHO MEAS - LVOT AREA: 2.8 CM2
BH CV ECHO MEAS - LVOT DIAM: 1.9 CM
BH CV ECHO MEAS - LVPWD: 1.08 CM
BH CV ECHO MEAS - MED PEAK E' VEL: 7.7 CM/SEC
BH CV ECHO MEAS - MV A MAX VEL: 132 CM/SEC
BH CV ECHO MEAS - MV DEC TIME: 0.35 SEC
BH CV ECHO MEAS - MV E MAX VEL: 77.8 CM/SEC
BH CV ECHO MEAS - MV E/A: 0.59
BH CV ECHO MEAS - RAP SYSTOLE: 3 MMHG
BH CV ECHO MEAS - RVSP: 11.3 MMHG
BH CV ECHO MEAS - SV(LVOT): 56.7 ML
BH CV ECHO MEAS - SV(MOD-SP2): 42.7 ML
BH CV ECHO MEAS - SV(MOD-SP4): 61.5 ML
BH CV ECHO MEAS - SVI(LVOT): 26.5 ML/M2
BH CV ECHO MEAS - SVI(MOD-SP2): 19.9 ML/M2
BH CV ECHO MEAS - SVI(MOD-SP4): 28.7 ML/M2
BH CV ECHO MEAS - TR MAX PG: 8.3 MMHG
BH CV ECHO MEAS - TR MAX VEL: 144 CM/SEC
BH CV ECHO MEASUREMENTS AVERAGE E/E' RATIO: 10.88
BH CV XLRA - RV BASE: 2.9 CM
BH CV XLRA - RV LENGTH: 5.1 CM
BH CV XLRA - RV MID: 2.21 CM
BILIRUB SERPL-MCNC: 0.3 MG/DL (ref 0–1.2)
BUN SERPL-MCNC: 17.1 MG/DL (ref 8–23)
BUN/CREAT SERPL: 20.4 (ref 7–25)
CALCIUM SPEC-SCNC: 8.4 MG/DL (ref 8.6–10.5)
CHLORIDE SERPL-SCNC: 109 MMOL/L (ref 98–107)
CO2 SERPL-SCNC: 24 MMOL/L (ref 22–29)
CREAT SERPL-MCNC: 0.84 MG/DL (ref 0.76–1.27)
DEPRECATED RDW RBC AUTO: 43.2 FL (ref 37–54)
EGFRCR SERPLBLD CKD-EPI 2021: 95 ML/MIN/1.73
EOSINOPHIL # BLD AUTO: 0.06 10*3/MM3 (ref 0–0.4)
EOSINOPHIL NFR BLD AUTO: 0.3 % (ref 0.3–6.2)
ERYTHROCYTE [DISTWIDTH] IN BLOOD BY AUTOMATED COUNT: 13.6 % (ref 12.3–15.4)
GLOBULIN UR ELPH-MCNC: 2.9 GM/DL
GLUCOSE BLDC GLUCOMTR-MCNC: 106 MG/DL (ref 70–130)
GLUCOSE BLDC GLUCOMTR-MCNC: 88 MG/DL (ref 70–130)
GLUCOSE BLDC GLUCOMTR-MCNC: 91 MG/DL (ref 70–130)
GLUCOSE BLDC GLUCOMTR-MCNC: 99 MG/DL (ref 70–130)
GLUCOSE SERPL-MCNC: 89 MG/DL (ref 65–99)
HCT VFR BLD AUTO: 26.6 % (ref 37.5–51)
HGB BLD-MCNC: 8.9 G/DL (ref 13–17.7)
IMM GRANULOCYTES # BLD AUTO: 0.11 10*3/MM3 (ref 0–0.05)
IMM GRANULOCYTES NFR BLD AUTO: 0.6 % (ref 0–0.5)
LEFT ATRIUM VOLUME INDEX: 26.5 ML/M2
LEFT ATRIUM VOLUME: 56.7 ML
LV EF BIPLANE MOD: 72 %
LYMPHOCYTES # BLD AUTO: 2.61 10*3/MM3 (ref 0.7–3.1)
LYMPHOCYTES NFR BLD AUTO: 15 % (ref 19.6–45.3)
MCH RBC QN AUTO: 29.5 PG (ref 26.6–33)
MCHC RBC AUTO-ENTMCNC: 33.5 G/DL (ref 31.5–35.7)
MCV RBC AUTO: 88.1 FL (ref 79–97)
MONOCYTES # BLD AUTO: 1.65 10*3/MM3 (ref 0.1–0.9)
MONOCYTES NFR BLD AUTO: 9.5 % (ref 5–12)
NEUTROPHILS NFR BLD AUTO: 12.94 10*3/MM3 (ref 1.7–7)
NEUTROPHILS NFR BLD AUTO: 74.3 % (ref 42.7–76)
NRBC BLD AUTO-RTO: 0.2 /100 WBC (ref 0–0.2)
PLATELET # BLD AUTO: 232 10*3/MM3 (ref 140–450)
PMV BLD AUTO: 10.7 FL (ref 6–12)
POTASSIUM SERPL-SCNC: 4.2 MMOL/L (ref 3.5–5.2)
PROT SERPL-MCNC: 6 G/DL (ref 6–8.5)
QT INTERVAL: 362 MS
QT INTERVAL: 366 MS
QTC INTERVAL: 486 MS
QTC INTERVAL: 514 MS
RBC # BLD AUTO: 3.02 10*6/MM3 (ref 4.14–5.8)
SODIUM SERPL-SCNC: 142 MMOL/L (ref 136–145)
WBC NRBC COR # BLD AUTO: 17.42 10*3/MM3 (ref 3.4–10.8)

## 2025-08-01 PROCEDURE — 82948 REAGENT STRIP/BLOOD GLUCOSE: CPT

## 2025-08-01 PROCEDURE — 63710000001 INSULIN LISPRO (HUMAN) PER 5 UNITS: Performed by: NURSE PRACTITIONER

## 2025-08-01 PROCEDURE — 25010000002 HEPARIN (PORCINE) PER 1000 UNITS: Performed by: NURSE PRACTITIONER

## 2025-08-01 PROCEDURE — 97530 THERAPEUTIC ACTIVITIES: CPT | Performed by: OCCUPATIONAL THERAPIST

## 2025-08-01 PROCEDURE — 25810000003 SODIUM CHLORIDE 0.9 % SOLUTION: Performed by: NURSE PRACTITIONER

## 2025-08-01 PROCEDURE — 80053 COMPREHEN METABOLIC PANEL: CPT | Performed by: NURSE PRACTITIONER

## 2025-08-01 PROCEDURE — 97110 THERAPEUTIC EXERCISES: CPT

## 2025-08-01 PROCEDURE — 94761 N-INVAS EAR/PLS OXIMETRY MLT: CPT

## 2025-08-01 PROCEDURE — 99024 POSTOP FOLLOW-UP VISIT: CPT | Performed by: NURSE PRACTITIONER

## 2025-08-01 PROCEDURE — 85025 COMPLETE CBC W/AUTO DIFF WBC: CPT | Performed by: NURSE PRACTITIONER

## 2025-08-01 PROCEDURE — 63710000001 INSULIN GLARGINE PER 5 UNITS: Performed by: NURSE PRACTITIONER

## 2025-08-01 PROCEDURE — 82948 REAGENT STRIP/BLOOD GLUCOSE: CPT | Performed by: NURSE PRACTITIONER

## 2025-08-01 PROCEDURE — 97530 THERAPEUTIC ACTIVITIES: CPT

## 2025-08-01 PROCEDURE — 94799 UNLISTED PULMONARY SVC/PX: CPT

## 2025-08-01 RX ADMIN — NICOTINE 1 PATCH: 7 PATCH, EXTENDED RELEASE TRANSDERMAL at 10:05

## 2025-08-01 RX ADMIN — Medication 1 APPLICATION: at 20:18

## 2025-08-01 RX ADMIN — GABAPENTIN 100 MG: 100 CAPSULE ORAL at 20:18

## 2025-08-01 RX ADMIN — HEPARIN SODIUM 5000 UNITS: 5000 INJECTION INTRAVENOUS; SUBCUTANEOUS at 17:39

## 2025-08-01 RX ADMIN — OXYCODONE AND ACETAMINOPHEN 1 TABLET: 325; 10 TABLET ORAL at 20:28

## 2025-08-01 RX ADMIN — GABAPENTIN 100 MG: 100 CAPSULE ORAL at 08:41

## 2025-08-01 RX ADMIN — METOPROLOL TARTRATE 12.5 MG: 25 TABLET, FILM COATED ORAL at 20:18

## 2025-08-01 RX ADMIN — GABAPENTIN 100 MG: 100 CAPSULE ORAL at 15:34

## 2025-08-01 RX ADMIN — INSULIN GLARGINE 17 UNITS: 100 INJECTION, SOLUTION SUBCUTANEOUS at 20:28

## 2025-08-01 RX ADMIN — METFORMIN HYDROCHLORIDE 500 MG: 500 TABLET, EXTENDED RELEASE ORAL at 17:39

## 2025-08-01 RX ADMIN — CYCLOBENZAPRINE HYDROCHLORIDE 10 MG: 10 TABLET, FILM COATED ORAL at 04:16

## 2025-08-01 RX ADMIN — Medication 10 ML: at 08:39

## 2025-08-01 RX ADMIN — OXYCODONE AND ACETAMINOPHEN 1 TABLET: 325; 10 TABLET ORAL at 12:54

## 2025-08-01 RX ADMIN — Medication 1 APPLICATION: at 08:42

## 2025-08-01 RX ADMIN — SODIUM CHLORIDE 1000 ML: 9 INJECTION, SOLUTION INTRAVENOUS at 09:27

## 2025-08-01 RX ADMIN — LIDOCAINE 1 PATCH: 4 PATCH TOPICAL at 08:42

## 2025-08-01 RX ADMIN — Medication 20000 UNITS: at 08:41

## 2025-08-01 RX ADMIN — ATORVASTATIN CALCIUM 40 MG: 40 TABLET, FILM COATED ORAL at 20:18

## 2025-08-01 RX ADMIN — CHLORHEXIDINE GLUCONATE 1 APPLICATION: 500 CLOTH TOPICAL at 08:39

## 2025-08-01 RX ADMIN — OXYCODONE AND ACETAMINOPHEN 1 TABLET: 325; 10 TABLET ORAL at 16:48

## 2025-08-01 RX ADMIN — INSULIN LISPRO 5 UNITS: 100 INJECTION, SOLUTION INTRAVENOUS; SUBCUTANEOUS at 17:39

## 2025-08-01 RX ADMIN — ASPIRIN 81 MG: 81 TABLET, COATED ORAL at 08:41

## 2025-08-01 RX ADMIN — CYCLOBENZAPRINE HYDROCHLORIDE 10 MG: 10 TABLET, FILM COATED ORAL at 15:35

## 2025-08-01 RX ADMIN — Medication 10 ML: at 20:23

## 2025-08-01 RX ADMIN — HEPARIN SODIUM 5000 UNITS: 5000 INJECTION INTRAVENOUS; SUBCUTANEOUS at 06:10

## 2025-08-01 RX ADMIN — Medication 10 MG: at 20:18

## 2025-08-01 RX ADMIN — OXYCODONE AND ACETAMINOPHEN 1 TABLET: 325; 10 TABLET ORAL at 03:06

## 2025-08-01 RX ADMIN — OXYCODONE AND ACETAMINOPHEN 1 TABLET: 325; 10 TABLET ORAL at 09:12

## 2025-08-01 RX ADMIN — Medication 220 MG: at 08:41

## 2025-08-01 RX ADMIN — METOPROLOL TARTRATE 12.5 MG: 25 TABLET, FILM COATED ORAL at 08:42

## 2025-08-01 RX ADMIN — OXYCODONE HYDROCHLORIDE AND ACETAMINOPHEN 1000 MG: 500 TABLET ORAL at 08:41

## 2025-08-01 RX ADMIN — METFORMIN HYDROCHLORIDE 500 MG: 500 TABLET, EXTENDED RELEASE ORAL at 08:41

## 2025-08-01 NOTE — PLAN OF CARE
Goal Outcome Evaluation:           Progress: improving     Pt A/Ox4. VSS on RA. IV to LFA, IID. Transferred from ICU to 3A. C/o pain, PRN meds given. Tele. Voiding via urinal. + orthos. Incision to back, open to air. Monitor BP. Can be impulsive at times per report from ICU, have not witnessed on 3A. Safety maintained. Call light in reach.

## 2025-08-01 NOTE — CASE MANAGEMENT/SOCIAL WORK
Continued Stay Note  [facility]     Patient Name: Porfirio Gates  MRN: [id number]  Today's Date: 8/1/2025    Admit Date: 7/29/2025    Plan: Home   Discharge Plan       Row Name 08/01/25 1552       Plan    Plan Home    Patient/Family in Agreement with Plan yes    Plan Comments Pt lives at home alone and plans to return home at d/c. Therapy recommends home with assist. Will follow.                   Discharge Codes    No documentation.                       KUN Castañeda

## 2025-08-01 NOTE — PLAN OF CARE
Goal Outcome Evaluation:  Plan of Care Reviewed With: patient        Progress: no change  Outcome Evaluation: Pt was in bed, agreed to therapy.  C/o pain from drain removal.  Pt was able to transfer sidelying to sitting with min assist.  Sitting EOB, pt c/o increase dizziness and had to lay back down, unable to attempt sit to stand.  Pt performed LE exercises while in supine.  Pt's BP in supine was 95/57, in sitting 87/62 with HR in sitting 122.  Will continue to work with pt to progress with amb as pt is able.

## 2025-08-01 NOTE — THERAPY TREATMENT NOTE
Acute Care - Physical Therapy Treatment Note  [facility]     Patient Name: Porfirio Gates  : 1955  MRN: [id number]  Today's Date: 2025      Visit Dx:     ICD-10-CM ICD-9-CM   1. Impaired mobility [Z74.09]  Z74.09 799.89   2. 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    Chronic dyspnea    Pulmonary emphysema    Metastasis to spinal column    Metastatic cancer to spine    Paroxysmal SVT (supraventricular tachycardia)    Nonrheumatic aortic valve stenosis    Hyperlipidemia LDL goal <70     Past Medical History:   Diagnosis Date    Diabetes type 2     [place] (hard of hearing)     has hearing aids    Hypertension     Kidney stones      Past Surgical History:   Procedure Laterality Date    ADENOIDECTOMY      CARDIAC CATHETERIZATION N/A 2023    Procedure: Left Heart Cath;  Surgeon: Melvin Peterson MD;  Location: [facility] CATH INVASIVE LOCATION;  Service: Cardiology;  Laterality: N/A;    CHOLECYSTECTOMY      CYSTOSCOPY URETEROSCOPY LASER LITHOTRIPSY Left 3/10/2017    Procedure: CYSTOSCOPY URETEROSCOPY LASER LITHOTRIPSY;  Surgeon: Neftali Pereira MD;  Location: [facility] OR;  Service:     CYSTOSCOPY W/ URETERAL STENT PLACEMENT Left 3/10/2017    Procedure: CYSTOSCOPY URETERAL CATHETER/STENT INSERTION LEFT URETER;  Surgeon: Neftali Pereira MD;  Location: [facility] OR;  Service:     LUMBAR BONE BIOPSY N/A 7/15/2025    Procedure: OPEN T11 BONE BIOPSY;  Surgeon: Endy Marte MD;  Location: [facility] OR;   Service: Neurosurgery;  Laterality: N/A;     PT Assessment (Last 12 Hours)       PT Evaluation and Treatment       Row Name 08/01/25 1340          Physical Therapy Time and Intention    Subjective Information complains of;weakness;fatigue;pain;dizziness  -MARLEY     Document Type therapy note (daily note)  -MARLEY     Mode of Treatment physical therapy  -MARLEY       Row Name 08/01/25 1340          General Information    Existing Precautions/Restrictions fall;spinal;orthostatic hypotension  -MARLEY     Limitations/Impairments hearing  -MARLEY       Row Name 08/01/25 1340          Pain    Pretreatment Pain Rating 4/10  -MARLEY     Posttreatment Pain Rating 4/10  -MARLEY     Pain Location back  -MARLEY     Pain Side/Orientation generalized  -MARLEY     Pain Management Interventions exercise or physical activity utilized  -MARLEY     Response to Pain Interventions activity participation with tolerable pain  -MARLEY       Row Name 08/01/25 1340          Bed Mobility    Sidelying-Sit [place] (Bed Mobility) verbal cues;minimum assist (75% patient effort)  -MARLEY     Sit-Sidelying [place] (Bed Mobility) verbal cues;contact guard  -MARLEY     Comment, (Bed Mobility) can be impulsive  -MARLEY       Row Name 08/01/25 1340          Transfers    Comment, (Transfers) deferred due to low BP and increase dizziness  -MARLEY       Row Name 08/01/25 1340          Motor Skills    Comments, Therapeutic Exercise in supine AROM BLE X 15  -MARLEY       Row Name             Wound 07/29/25 1236 thoracic spine Surgical Closed Surgical Incision    Wound - Properties Group Placement Date: 07/29/25  -SY Placement Time: 1236  -SY Present on Original Admission: N  -SY Location: thoracic spine  -SY Primary Wound Type: Surgical  -SY Secondary Wound Type - Surgical: Closed Surgi  -SY    Retired Wound - Properties Group Placement Date: 07/29/25  -SY Placement Time: 1236  -SY Present on Original Admission: N  -SY Location: thoracic spine  -SY    Retired Wound - Properties Group Placement Date: 07/29/25   -SY Placement Time: 1236  -SY Present on Original Admission: N  -SY Location: thoracic spine  -SY    Retired Wound - Properties Group Date first assessed: 07/29/25  -SY Time first assessed: 1236  -SY Present on Original Admission: N  -SY Location: thoracic spine  -SY      Row Name 08/01/25 1340          Vital Signs    Pre Systolic BP Rehab 95  -MARLEY     Pre Treatment Diastolic BP 57  -MARLEY     Intra Systolic BP Rehab 87  -MARLEY     Intra Treatment Diastolic BP 62  -MARLEY     Pre Patient Position Supine  -MARLEY     Intra Patient Position Sitting  -MARLEY       Row Name 08/01/25 1340          Positioning and Restraints    Pre-Treatment Position in bed  -MARLEY     Post Treatment Position bed  -MARLEY     In Bed side lying left;call light within reach;encouraged to call for assist;side rails up x2  -MARLEY               User Key  (r) = Recorded By, (t) = Taken By, (c) = Cosigned By      Initials Name Provider Type    Shad Perrin PTA Physical Therapist Assistant    Kristie Rubalcava RN Registered Nurse                    Physical Therapy Education       Title: PT OT SLP Therapies (In Progress)       Topic: Physical Therapy (In Progress)       Point: Mobility training (In Progress)       Learning Progress Summary            Patient Acceptance, E, NR by JAMES at 7/30/2025 0815    Comment: Benefits of activity, progression of PT, log rolling, spinal prec                      Point: Home exercise program (Not Started)       Learner Progress:  Not documented in this visit.              Point: Body mechanics (Not Started)       Learner Progress:  Not documented in this visit.              Point: Precautions (In Progress)       Learning Progress Summary            Patient Acceptance, E, NR by JAMES at 7/30/2025 0815    Comment: Benefits of activity, progression of PT, log rolling, spinal prec                                      User Key       Initials Effective Dates Name Provider Type Discipline    JAMES 02/03/23 -  Rene Song, PT DPT Physical  Therapist PT                  PT Recommendation and Plan     Progress: no change  Outcome Evaluation: Pt was in bed, agreed to therapy.  C/o pain from drain removal.  Pt was able to transfer sidelying to sitting with min assist.  Sitting EOB, pt c/o increase dizziness and had to lay back down, unable to attempt sit to stand.  Pt performed LE exercises while in supine.  Pt's BP in supine was 95/57, in sitting 87/62 with HR in sitting 122.  Will continue to work with pt to progress with amb as pt is able.   Outcome Measures       Row Name 08/01/25 1340 07/31/25 1334          How much help from another person do you currently need...    Turning from your back to your side while in flat bed without using bedrails? 4  -MARLEY 3  -MARLEY     Moving from lying on back to sitting on the side of a flat bed without bedrails? 3  -MARLEY 3  -MARLEY     Moving to and from a bed to a chair (including a wheelchair)? 3  -MARLEY 3  -MARLEY     Standing up from a chair using your arms (e.g., wheelchair, bedside chair)? 3  -MARLEY 3  -MARLEY     Climbing 3-5 steps with a railing? 2  -MARLEY 2  -MARLEY     To walk in hospital room? 3  -MARLEY 3  -MARLEY     AM-PAC 6 Clicks Score (PT) 18  -MARLEY 17  -MARLEY        Functional Assessment    Outcome Measure Options AM-PAC 6 Clicks Basic Mobility (PT)  -MARLEY AM-PAC 6 Clicks Basic Mobility (PT)  -MARLEY               User Key  (r) = Recorded By, (t) = Taken By, (c) = Cosigned By      Initials Name Provider Type    Shad Perrin, PTA Physical Therapist Assistant                     Time Calculation:    PT Charges       Row Name 08/01/25 1340             Time Calculation    Start Time 1340  -MARLEY      Stop Time 1404  -MARLEY      Time Calculation (min) 24 min  -MARLEY      PT Received On 08/01/25  -         Time Calculation- PT    Total Timed Code Minutes- PT 24 minute(s)  -MARLEY         Timed Charges    39575 - PT Therapeutic Exercise Minutes 14  -MARLEY      81036 - PT Therapeutic Activity Minutes 10  -MARLEY         Total Minutes    Timed Charges Total Minutes  24  -MARLEY       Total Minutes 24  -MARLEY                User Key  (r) = Recorded By, (t) = Taken By, (c) = Cosigned By      Initials Name Provider Type    Shad Perrin PTA Physical Therapist Assistant                  Therapy Charges for Today       Code Description Service Date Service Provider Modifiers Qty    11886404835 HC PT THERAPEUTIC ACT EA 15 MIN 7/31/2025 Shad Mcghee, PTA GP 1    87031201646 HC PT THER PROC EA 15 MIN 7/31/2025 hSad Mcghee, PTA GP 1    14096713775 HC PT THERAPEUTIC ACT EA 15 MIN 8/1/2025 Shad Mcghee, PTA GP 1    69060467223 HC PT THER PROC EA 15 MIN 8/1/2025 Shad Mcghee, PTA GP 1            PT G-Codes  Outcome Measure Options: AM-PAC 6 Clicks Basic Mobility (PT)  AM-PAC 6 Clicks Score (PT): 18  AM-PAC 6 Clicks Score (OT): 19    Shad Mcghee PTA  8/1/2025

## 2025-08-01 NOTE — PLAN OF CARE
Goal Outcome Evaluation:   Pt AXOX4. Sinus rhythm with PACs during the night. No SVT or tachycardia events. Pt had 3 Bms. 70cc out of KATARZYNA. Plan of care continues.           Problem: Adult Inpatient Plan of Care  Goal: Plan of Care Review  Outcome: Progressing  Goal: Patient-Specific Goal (Individualized)  Outcome: Progressing  Goal: Absence of Hospital-Acquired Illness or Injury  Outcome: Progressing  Intervention: Identify and Manage Fall Risk  Recent Flowsheet Documentation  Taken 8/1/2025 0500 by Angie Rubio RN  Safety Promotion/Fall Prevention: safety round/check completed  Taken 8/1/2025 0400 by Angie Rubio RN  Safety Promotion/Fall Prevention: safety round/check completed  Taken 8/1/2025 0306 by Angie Rubio RN  Safety Promotion/Fall Prevention: safety round/check completed  Taken 8/1/2025 0200 by Angie Rubio RN  Safety Promotion/Fall Prevention: safety round/check completed  Taken 8/1/2025 0100 by Angie Rubio RN  Safety Promotion/Fall Prevention: safety round/check completed  Taken 8/1/2025 0000 by Angie Rubio RN  Safety Promotion/Fall Prevention: safety round/check completed  Taken 7/31/2025 2300 by Angie Rubio RN  Safety Promotion/Fall Prevention: safety round/check completed  Taken 7/31/2025 2200 by Angie Rubio RN  Safety Promotion/Fall Prevention: safety round/check completed  Taken 7/31/2025 2100 by Angie Rubio RN  Safety Promotion/Fall Prevention:   safety round/check completed   fall prevention program maintained  Taken 7/31/2025 2000 by Angie Rubio RN  Safety Promotion/Fall Prevention:   fall prevention program maintained   safety round/check completed  Taken 7/31/2025 1900 by Angie Rubio RN  Safety Promotion/Fall Prevention:   safety round/check completed   fall prevention program maintained  Intervention: Prevent Skin Injury  Recent Flowsheet Documentation  Taken 8/1/2025 0500 by Angie Rubio RN  Body Position: position changed independently  Taken 8/1/2025 0400 by  Angie Rubio RN  Body Position: position changed independently  Taken 8/1/2025 0306 by Angie Rubio RN  Body Position: position changed independently  Taken 8/1/2025 0200 by Angie Rubio RN  Body Position: position changed independently  Taken 8/1/2025 0100 by Angie Rubio RN  Body Position: position changed independently  Taken 8/1/2025 0000 by Angie Rubio RN  Body Position: position changed independently  Skin Protection: silicone foam dressing in place  Taken 7/31/2025 2300 by Angie Rubio RN  Body Position: position changed independently  Taken 7/31/2025 2200 by Angie Rubio RN  Body Position:   position changed independently   right  Taken 7/31/2025 2100 by Angie Rubio RN  Body Position: position changed independently  Taken 7/31/2025 2000 by Angie Rubio RN  Body Position:   position changed independently   right   supine  Skin Protection:   silicone foam dressing in place   incontinence pads utilized  Taken 7/31/2025 1900 by Angie Rubio RN  Body Position:   position changed independently   left   supine  Goal: Optimal Comfort and Wellbeing  Outcome: Progressing  Intervention: Monitor Pain and Promote Comfort  Recent Flowsheet Documentation  Taken 8/1/2025 0306 by Angie Rubio RN  Pain Management Interventions: pain medication given  Intervention: Provide Person-Centered Care  Recent Flowsheet Documentation  Taken 8/1/2025 0400 by Angie Rubio RN  Trust Relationship/Rapport:   care explained   choices provided  Taken 7/31/2025 2000 by Angie Rubio RN  Trust Relationship/Rapport:   care explained   choices provided  Goal: Readiness for Transition of Care  Outcome: Progressing     Problem: Skin Injury Risk Increased  Goal: Skin Health and Integrity  Outcome: Progressing  Intervention: Optimize Skin Protection  Recent Flowsheet Documentation  Taken 8/1/2025 0500 by Angie Rubio RN  Head of Bed (HOB) Positioning: HOB at 20-30 degrees  Taken 8/1/2025 0400 by Angie Rubio RN  Head of  Bed (HOB) Positioning: HOB at 20-30 degrees  Taken 8/1/2025 0306 by Angie Rubio RN  Head of Bed (HOB) Positioning: HOB at 20-30 degrees  Taken 8/1/2025 0200 by Angie Rubio RN  Head of Bed (HOB) Positioning: HOB at 20-30 degrees  Taken 8/1/2025 0100 by Angie Rubio RN  Head of Bed (HOB) Positioning: HOB at 20-30 degrees  Taken 8/1/2025 0000 by Angie Rubio RN  Activity Management: activity encouraged  Pressure Reduction Techniques: frequent weight shift encouraged  Head of Bed (HOB) Positioning: HOB at 20-30 degrees  Pressure Reduction Devices: pressure-redistributing mattress utilized  Skin Protection: silicone foam dressing in place  Taken 7/31/2025 2300 by Angie Rubio RN  Head of Bed (HOB) Positioning: HOB at 20-30 degrees  Taken 7/31/2025 2200 by Angie Rubio RN  Head of Bed (HOB) Positioning: HOB at 20-30 degrees  Taken 7/31/2025 2100 by Angie Rubio RN  Head of Bed (HOB) Positioning: HOB at 20-30 degrees  Taken 7/31/2025 2000 by Angie Rubio RN  Activity Management: activity encouraged  Pressure Reduction Techniques: frequent weight shift encouraged  Head of Bed (HOB) Positioning: HOB at 20-30 degrees  Pressure Reduction Devices:   pressure-redistributing mattress utilized   foam padding utilized  Skin Protection:   silicone foam dressing in place   incontinence pads utilized  Taken 7/31/2025 1900 by Angie Rubio RN  Activity Management: activity encouraged  Head of Bed (HOB) Positioning: HOB at 20-30 degrees     Problem: Comorbidity Management  Goal: Blood Glucose Level Within Target Range  Outcome: Progressing  Intervention: Monitor and Manage Glycemia  Recent Flowsheet Documentation  Taken 7/31/2025 2100 by Angie Rubio RN  Medication Review/Management: medications reviewed  Taken 7/31/2025 2000 by Angie Rubio RN  Medication Review/Management: medications reviewed  Taken 7/31/2025 1900 by Angie Rubio RN  Medication Review/Management: medications reviewed     Problem: Fall Injury  Risk  Goal: Absence of Fall and Fall-Related Injury  Outcome: Progressing  Intervention: Identify and Manage Contributors  Recent Flowsheet Documentation  Taken 7/31/2025 2100 by Angie Rubio RN  Medication Review/Management: medications reviewed  Taken 7/31/2025 2000 by Angie Rubio RN  Medication Review/Management: medications reviewed  Taken 7/31/2025 1900 by Angie Rubio RN  Medication Review/Management: medications reviewed  Intervention: Promote Injury-Free Environment  Recent Flowsheet Documentation  Taken 8/1/2025 0500 by Angie Rubio RN  Safety Promotion/Fall Prevention: safety round/check completed  Taken 8/1/2025 0400 by Angie Rubio RN  Safety Promotion/Fall Prevention: safety round/check completed  Taken 8/1/2025 0306 by Angie Rubio RN  Safety Promotion/Fall Prevention: safety round/check completed  Taken 8/1/2025 0200 by Angie Rubio RN  Safety Promotion/Fall Prevention: safety round/check completed  Taken 8/1/2025 0100 by Angie Rubio RN  Safety Promotion/Fall Prevention: safety round/check completed  Taken 8/1/2025 0000 by Angie Rubio RN  Safety Promotion/Fall Prevention: safety round/check completed  Taken 7/31/2025 2300 by Angie Rubio RN  Safety Promotion/Fall Prevention: safety round/check completed  Taken 7/31/2025 2200 by Angie Rubio RN  Safety Promotion/Fall Prevention: safety round/check completed  Taken 7/31/2025 2100 by Angie Rubio RN  Safety Promotion/Fall Prevention:   safety round/check completed   fall prevention program maintained  Taken 7/31/2025 2000 by Angie Rubio RN  Safety Promotion/Fall Prevention:   fall prevention program maintained   safety round/check completed  Taken 7/31/2025 1900 by Angie Rubio RN  Safety Promotion/Fall Prevention:   safety round/check completed   fall prevention program maintained

## 2025-08-01 NOTE — PROGRESS NOTES
[facility] -  Progress Note     LOS: 3 days   Patient Care Team:  Jenaro Graff MD as PCP - General (Internal Medicine)  Neftali Pereira MD as Consulting Physician (Urology)  [facility]  Thiago Saul DO as Consulting Physician (Pulmonary Disease)  Endy Marte MD as Surgeon (Neurosurgery)    Chief Complaint: Back pain    Subjective     Interval History:    Patient seen and examined at bedside.  No new complaints this morning.  Currently resting in bed.  Discussed with neurosurgery.    Review of Systems:  Review of Systems   Constitutional: Negative for diaphoresis, fever and malaise/fatigue.   HENT:  Negative for congestion.    Eyes:  Negative for vision loss in left eye and vision loss in right eye.   Cardiovascular:  Positive for irregular heartbeat. Negative for chest pain, claudication, dyspnea on exertion, leg swelling, orthopnea, palpitations and syncope.   Respiratory:  Negative for cough, shortness of breath and wheezing.    Hematologic/Lymphatic: Negative for adenopathy.   Skin:  Negative for rash.   Musculoskeletal:  Positive for back pain. Negative for joint pain and joint swelling.   Gastrointestinal:  Negative for abdominal pain, diarrhea, nausea and vomiting.   Neurological:  Negative for excessive daytime sleepiness, dizziness, focal weakness, light-headedness, numbness and weakness.   Psychiatric/Behavioral:  Negative for depression. The patient does not have insomnia.        Vital Sign Min/Max for last 24 hours  Temp  Min: 98 °F (36.7 °C)  Max: 99.5 °F (37.5 °C)   BP  Min: 89/53  Max: 150/67   Pulse  Min: 69  Max: 122   Resp  Min: 15  Max: 22   SpO2  Min: 85 %  Max: 100 %   No data recorded   Weight  Min: 99 kg (218 lb 4.1 oz)  Max: 99 kg (218 lb 4.1 oz)         08/01/25  0600   Weight: 99 kg (218 lb 4.1 oz)       Physical Exam:   Vitals and nursing note reviewed.   Constitutional:       Appearance: Normal and healthy  appearance. Well-developed and not in distress.   Eyes:      Extraocular Movements: Extraocular movements intact.      Pupils: Pupils are equal, round, and reactive to light.   HENT:      Head: Normocephalic and atraumatic.    Mouth/Throat:      Pharynx: Oropharynx is clear.   Neck:      Vascular: JVD normal.      Trachea: Trachea normal.   Pulmonary:      Effort: Pulmonary effort is normal.      Breath sounds: Normal breath sounds. No wheezing. No rhonchi. No rales.   Cardiovascular:      PMI at left midclavicular line. Normal rate. Regular rhythm. Normal S1. Normal S2.       Murmurs: There is no murmur.      No gallop.  No click. No rub.   Pulses:     Dorsalis pedis: 2+ bilaterally.     Posterior tibial: 2+ bilaterally.  Abdominal:      General: Bowel sounds are normal.      Palpations: Abdomen is soft.      Tenderness: There is no abdominal tenderness.   Musculoskeletal: Normal range of motion.      Cervical back: Normal range of motion and neck supple. Skin:     General: Skin is warm and dry.      Capillary Refill: Capillary refill takes less than 2 seconds.   Feet:      Right foot:      Skin integrity: Skin integrity normal.      Left foot:      Skin integrity: Skin integrity normal.   Neurological:      Mental Status: Alert and oriented to person, place and time.      Sensory: Sensation is intact.      Motor: Motor function is intact.      Coordination: Coordination is intact.   Psychiatric:         Speech: Speech normal.         Behavior: Behavior is cooperative.         Medication Review: yes  Current Facility-Administered Medications   Medication Dose Route Frequency Provider Last Rate Last Admin    ascorbic acid (VITAMIN C) tablet 1,000 mg  1,000 mg Oral Daily Miguel Can APRN   1,000 mg at 08/01/25 0841    aspirin EC tablet 81 mg  81 mg Oral Daily Miguel Can APRN   81 mg at 08/01/25 0841    atorvastatin (LIPITOR) tablet 40 mg  40 mg Oral Nightly Miguel Can APRN   40 mg at 07/31/25 2011     Calcium Replacement - Follow Nurse / BPA Driven Protocol   Not Applicable PRN Miguel Can APRN        Chlorhexidine Gluconate Cloth 2 % pads 1 Application  1 Application Topical Daily Endy Marte MD   1 Application at 08/01/25 0839    cyclobenzaprine (FLEXERIL) tablet 10 mg  10 mg Oral TID PRN Ebenezer Rey, DO   10 mg at 08/01/25 0416    dextrose (D50W) (25 g/50 mL) IV injection 10-50 mL  10-50 mL Intravenous Q15 Min PRN Miguel Can APRN        dextrose (GLUTOSE) oral gel 15 g  15 g Oral Q15 Min PRN Miguel Can APRN        gabapentin (NEURONTIN) capsule 100 mg  100 mg Oral TID Miguel Can APRN   100 mg at 08/01/25 0841    Glucagon (GLUCAGEN) injection 1 mg  1 mg Intramuscular Q15 Min PRN Miguel Can APRN        heparin (porcine) 5000 UNIT/ML injection 5,000 Units  5,000 Units Subcutaneous Q12H Miguel Can APRN   5,000 Units at 08/01/25 0610    insulin glargine (LANTUS, SEMGLEE) injection 1-200 Units  1-200 Units Subcutaneous Nightly - Glucommander Miguel Can APRN   21 Units at 07/31/25 2011    insulin lispro (humaLOG) injection 1-200 Units  1-200 Units Subcutaneous 4x Daily With Meals & Nightly Miguel Can APRN   1 Units at 07/31/25 2011    insulin lispro (humaLOG) injection 1-200 Units  1-200 Units Subcutaneous PRN Miguel Can APRN        Lidocaine 4 % 1 patch  1 patch Transdermal Q24H Miguel Can APRN   1 patch at 08/01/25 0842    [Held by provider] losartan (COZAAR) tablet 25 mg  25 mg Oral Daily Miguel Can APRN   25 mg at 07/30/25 0806    Magnesium Standard Dose Replacement - Follow Nurse / BPA Driven Protocol   Not Applicable PRN Miguel Can APRN        melatonin tablet 10 mg  10 mg Oral Nightly Miguel Can APRN   10 mg at 07/31/25 2011    metFORMIN ER (GLUCOPHAGE-XR) 24 hr tablet 500 mg  500 mg Oral BID With Meals Miguel Can APRN   500 mg at 08/01/25 0841    metoprolol tartrate (LOPRESSOR) tablet 12.5 mg  12.5 mg Oral Q12H Monique Murguia, KACEY    12.5 mg at 08/01/25 0842    mupirocin (BACTROBAN) 2 % nasal ointment 1 Application  1 Application Each Nare BID Endy Marte MD   1 Application at 08/01/25 0842    nicotine (NICODERM CQ) 7 MG/24HR patch 1 patch  1 patch Transdermal Q24H Endy Marte MD        norepinephrine (LEVOPHED) 8 mg in 250 mL NS infusion (premix)  0.02-0.3 mcg/kg/min Intravenous Titrated Endy Marte MD        ondansetron ODT (ZOFRAN-ODT) disintegrating tablet 4 mg  4 mg Oral Q6H PRN RustMiguel APRN        Or    ondansetron (ZOFRAN) injection 4 mg  4 mg Intravenous Q6H PRN RustMiguel APRN        oxyCODONE-acetaminophen (PERCOCET)  MG per tablet 1 tablet  1 tablet Oral Q4H PRN RusMiguel galeano APRN   1 tablet at 08/01/25 0912    oxyCODONE-acetaminophen (PERCOCET) 7.5-325 MG per tablet 1 tablet  1 tablet Oral Q4H PRN RusMiguel galeano APRN   1 tablet at 07/31/25 1700    Phosphorus Replacement - Follow Nurse / BPA Driven Protocol   Not Applicable PRN Miguel Can APRN        polyethylene glycol (MIRALAX) packet 17 g  17 g Oral Daily RustMiguel APRN   17 g at 07/31/25 0928    Potassium Replacement - Follow Nurse / BPA Driven Protocol   Not Applicable PRN Miguel Can APRN        sennosides-docusate (PERICOLACE) 8.6-50 MG per tablet 2 tablet  2 tablet Oral BID RustMiguel APRN   2 tablet at 07/31/25 0941    sodium chloride 0.9 % bolus 1,000 mL  1,000 mL Intravenous Once Miguel Can APRN 1,000 mL/hr at 08/01/25 0927 1,000 mL at 08/01/25 0927    sodium chloride 0.9 % flush 10 mL  10 mL Intravenous Q12H RusMiguel galeano APRN   10 mL at 08/01/25 0839    sodium chloride 0.9 % flush 10 mL  10 mL Intravenous PRN RustDonavanMiguel M, APRN        sodium chloride 0.9 % infusion 40 mL  40 mL Intravenous PRN Miguel Can APRN        [Held by provider] tiZANidine (ZANAFLEX) tablet 4 mg  4 mg Oral Q6H Endy Marte MD   4 mg at 07/31/25 0338    Vitamin A capsule 20,000 Units  20,000 Units Oral Daily  Miguel Can, KACEY   20,000 Units at 08/01/25 0841    zinc sulfate (ZINCATE) capsule 220 mg  220 mg Oral Daily Miguel Can, KACEY   220 mg at 08/01/25 0841           Assessment & Plan       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  Continue on current beta-blocker therapy.  Okay for transfer to the floor from a cardiovascular standpoint.  Discussed with neurosurgery in the ICU.    Sim Saul DO  Interventional cardiologist  [facility]  08/01/25  09:27 CDT

## 2025-08-01 NOTE — THERAPY TREATMENT NOTE
Patient Name: Porfirio Gates  : 1955    MRN: [id number]                              Today's Date: 2025       Admit Date: 2025    Visit Dx:     ICD-10-CM ICD-9-CM   1. Impaired mobility [Z74.09]  Z74.09 799.89   2. 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    Chronic dyspnea    Pulmonary emphysema    Metastasis to spinal column    Metastatic cancer to spine    Paroxysmal SVT (supraventricular tachycardia)    Nonrheumatic aortic valve stenosis    Hyperlipidemia LDL goal <70     Past Medical History:   Diagnosis Date    Diabetes type 2     [place] (hard of hearing)     has hearing aids    Hypertension     Kidney stones      Past Surgical History:   Procedure Laterality Date    ADENOIDECTOMY      CARDIAC CATHETERIZATION N/A 2023    Procedure: Left Heart Cath;  Surgeon: Melvin Peterson MD;  Location:  [facility];  Service: Cardiology;  Laterality: N/A;    CHOLECYSTECTOMY      CYSTOSCOPY URETEROSCOPY LASER LITHOTRIPSY Left 3/10/2017    Procedure: CYSTOSCOPY URETEROSCOPY LASER LITHOTRIPSY;  Surgeon: Neftali Pereira MD;  Location:  [facility] OR;  Service:     CYSTOSCOPY W/ URETERAL STENT PLACEMENT Left 3/10/2017    Procedure: CYSTOSCOPY URETERAL CATHETER/STENT INSERTION LEFT URETER;  Surgeon: Neftali Pereira MD;  Location:  [facility] OR;  Service:     LUMBAR BONE BIOPSY N/A 7/15/2025    Procedure: OPEN T11 BONE BIOPSY;  Surgeon: Endy Marte MD;  Location:  [facility] OR;  Service:  Neurosurgery;  Laterality: N/A;      General Information       Row Name 08/01/25 1000          OT Time and Intention    Subjective Information no complaints (P)   -BS     Document Type therapy note (daily note) (P)   -BS     Mode of Treatment occupational therapy (P)   -BS     Patient Effort poor (P)   -BS     Symptoms Noted During/After Treatment dizziness;fatigue (P)   -BS       Row Name 08/01/25 1000          General Information    Existing Precautions/Restrictions fall;spinal;orthostatic hypotension (P)   -BS     Barriers to Rehab medically complex (P)   -BS       Row Name 08/01/25 1000          Cognition    Orientation Status (Cognition) oriented x 4 (P)   -BS       Row Name 08/01/25 1000          Safety Issues/Impairments Affecting Functional Mobility    Impairments Affecting Function (Mobility) strength;balance;endurance/activity tolerance;pain (P)   -BS               User Key  (r) = Recorded By, (t) = Taken By, (c) = Cosigned By      Initials Name Provider Type    BS Yovana Olea OT Student OT Student                     Mobility/ADL's       Row Name 08/01/25 1000          Bed Mobility    Bed Mobility rolling left;sidelying-sit;sit-sidelying (P)   -BS     Rolling Left Clifford (Bed Mobility) standby assist;verbal cues (P)   -BS     Sidelying-Sit Clifford (Bed Mobility) minimum assist (75% patient effort);1 person assist (P)   -BS     Sit-Sidelying Clifford (Bed Mobility) contact guard;verbal cues (P)   -BS     Bed Mobility, Safety Issues decreased use of legs for bridging/pushing;decreased use of arms for pushing/pulling (P)   -BS     Assistive Device (Bed Mobility) bed rails (P)   -BS               User Key  (r) = Recorded By, (t) = Taken By, (c) = Cosigned By      Initials Name Provider Type    BS Yovana Olea OT Student OT Student                   Obj/Interventions       Row Name 08/01/25 1000          Balance    Balance Assessment sitting static balance;sitting dynamic balance (P)    -BS     Static Sitting Balance contact guard (P)   -BS     Dynamic Sitting Balance contact guard (P)   -BS     Position, Sitting Balance supported (P)   supported by therapist as needed.  -BS     Balance Interventions sitting;static;supported;dynamic;occupation based/functional task (P)   -BS               User Key  (r) = Recorded By, (t) = Taken By, (c) = Cosigned By      Initials Name Provider Type    BS Yovana Olea, OT Student OT Student                   Goals/Plan    No documentation.                  Clinical Impression       Row Name 08/01/25 1000          Pain Assessment    Additional Documentation Pain Scale: FACES Pre/Post-Treatment (Group) (P)   -BS       Row Name 08/01/25 1000          Pain Scale: FACES Pre/Post-Treatment    Pain: FACES Scale, Pretreatment 0-->no hurt (P)   -BS     Posttreatment Pain Rating 0-->no hurt (P)   -BS       Row Name 08/01/25 1000          Plan of Care Review    Plan of Care Reviewed With patient;family (P)   -BS     Progress no change (P)   -BS     Outcome Evaluation OT tx completed. Pt presented sidelying in bed upon entering and was A&O x4. Pt was not in a pleasant mood this date and demonstrated to be very frustrated to answer orientation questions. He agreed to participate in therapy with increased motivation from family. Initially obtained orthostatics to begin session. Pt's BP in supine was 110/57. Pt was able to transition from bjfcqmvyu-xh-eqjsewv EOB with Reny. Pt reported feeling dizzy in sitting, requiring CGA for sitting balance. BP was assessed in sitting and was 92/74. Pt reported that he was not able to stand this date due to feeling dizzy. Pt transitioned back into sidelying with CGA. BP was assessed again back in sidelying and was 97/60. Pt was ended early due to imaging arriving to take pt. Mr. Recinos would benefit from continued skilled OT per POC. Recommend d/c home with assist. (P)   -BS       Row Name 08/01/25 1000          Therapy Assessment/Plan (OT)     Rehab Potential (OT) good (P)   -BS     Criteria for Skilled Therapeutic Interventions Met (OT) -- (P)   -BS     Therapy Frequency (OT) -- (P)   -BS     Predicted Duration of Therapy Intervention (OT) -- (P)   -BS       Row Name 08/01/25 1000          Therapy Plan Review/Discharge Plan (OT)    Anticipated Discharge Disposition (OT) home with assist (P)   -BS       Row Name 08/01/25 1000          Vital Signs    O2 Delivery Pre Treatment room air (P)   -BS     O2 Delivery Intra Treatment room air (P)   -BS     O2 Delivery Post Treatment room air (P)   -BS     Pre Patient Position Side Lying (P)   -BS     Intra Patient Position Sitting (P)   -BS     Post Patient Position Side Lying (P)   -BS       Row Name 08/01/25 1000          Positioning and Restraints    Pre-Treatment Position in bed (P)   -BS     Post Treatment Position bed (P)   -BS     In Bed side lying left;patient within staff view;with family/caregiver;with other staff;side rails up x3 (P)   -BS               User Key  (r) = Recorded By, (t) = Taken By, (c) = Cosigned By      Initials Name Provider Type    BS Yovana Olea, OT Student OT Student                   Outcome Measures       Row Name 08/01/25 1000          How much help from another is currently needed...    Putting on and taking off regular lower body clothing? 3 (P)   -BS     Bathing (including washing, rinsing, and drying) 3 (P)   -BS     Toileting (which includes using toilet bed pan or urinal) 3 (P)   -BS     Putting on and taking off regular upper body clothing 3 (P)   -BS     Taking care of personal grooming (such as brushing teeth) 3 (P)   -BS     Eating meals 4 (P)   -BS     AM-PAC 6 Clicks Score (OT) 19 (P)   -BS       Row Name 08/01/25 0800          How much help from another person do you currently need...    Turning from your back to your side while in flat bed without using bedrails? 4  -IB     Moving from lying on back to sitting on the side of a flat bed without bedrails? 3  -IB      Moving to and from a bed to a chair (including a wheelchair)? 3  -IB     Standing up from a chair using your arms (e.g., wheelchair, bedside chair)? 3  -IB     Climbing 3-5 steps with a railing? 2  -IB     To walk in hospital room? 3  -IB     AM-PAC 6 Clicks Score (PT) 18  -IB     Highest Level of Mobility Goal Walk 10 Steps or More-6  -IB       Row Name 08/01/25 1000          Functional Assessment    Outcome Measure Options AM-PAC 6 Clicks Daily Activity (OT) (P)   -BS               User Key  (r) = Recorded By, (t) = Taken By, (c) = Cosigned By      Initials Name Provider Type    IB Shital Gonzalez RN Registered Nurse    Yovana Wright, OT Student OT Student                    Occupational Therapy Education       Title: PT OT SLP Therapies (In Progress)       Topic: Occupational Therapy (Done)       Point: ADL training (Done)       Learning Progress Summary            Patient Acceptance, E, VU,NR by BS at 8/1/2025 1000    Comment: OT role in care    Acceptance, E, VU by BS at 7/31/2025 1122    Comment: OT POC; spinal precautions; log rolling   Family Acceptance, E, VU,NR by BS at 8/1/2025 1000    Comment: OT role in care    Acceptance, E, VU by BS at 7/31/2025 1122    Comment: OT POC; spinal precautions; log rolling                      Point: Precautions (Done)       Learning Progress Summary            Patient Acceptance, E, VU,NR by BS at 8/1/2025 1000    Comment: OT role in care    Acceptance, E, VU by BS at 7/31/2025 1122    Comment: OT POC; spinal precautions; log rolling   Family Acceptance, E, VU,NR by BS at 8/1/2025 1000    Comment: OT role in care    Acceptance, E, VU by BS at 7/31/2025 1122    Comment: OT POC; spinal precautions; log rolling                      Point: Body mechanics (Done)       Learning Progress Summary            Patient Acceptance, E, VU,NR by BS at 8/1/2025 1000    Comment: OT role in care    Acceptance, E, VU by BS at 7/31/2025 1122    Comment: OT POC; spinal precautions;  log rolling   Family Acceptance, E, VU,NR by BS at 8/1/2025 1000    Comment: OT role in care    Acceptance, E, VU by BS at 7/31/2025 1122    Comment: OT POC; spinal precautions; log rolling                                      User Key       Initials Effective Dates Name Provider Type Discipline     05/12/25 -  Yovana Olea, OT Student OT Student OT                  OT Recommendation and Plan     Plan of Care Review  Plan of Care Reviewed With: (P) patient, family  Progress: (P) no change  Outcome Evaluation: (P) OT tx completed. Pt presented sidelying in bed upon entering and was A&O x4. Pt was not in a pleasant mood this date and demonstrated to be very frustrated to answer orientation questions. He agreed to participate in therapy with increased motivation from family. Initially obtained orthostatics to begin session. Pt's BP in supine was 110/57. Pt was able to transition from chlcsgtoo-na-wkhlhun EOB with Reny. Pt reported feeling dizzy in sitting, requiring CGA for sitting balance. BP was assessed in sitting and was 92/74. Pt reported that he was not able to stand this date due to feeling dizzy. Pt transitioned back into sidelying with CGA. BP was assessed again back in sidelying and was 97/60. Pt was ended early due to imaging arriving to take pt. Mr. Recinos would benefit from continued skilled OT per POC. Recommend d/c home with assist.     Time Calculation:         Time Calculation- OT       Row Name 08/01/25 1000             Time Calculation- OT    OT Start Time 1000 (P)   -BS      OT Stop Time 1012 (P)   -BS      OT Time Calculation (min) 12 min (P)   -BS      Total Timed Code Minutes- OT 12 minute(s) (P)   -BS      OT Received On 08/01/25 (P)   -BS         Timed Charges    64308 - OT Therapeutic Activity Minutes 12 (P)   -BS         Total Minutes    Timed Charges Total Minutes 12 (P)   -BS       Total Minutes 12 (P)   -BS                User Key  (r) = Recorded By, (t) = Taken By, (c) = Cosigned By       Initials Name Provider Type    Yovana Wright, OT Student OT Student                           Yovana Olea, OT Student  8/1/2025

## 2025-08-01 NOTE — PLAN OF CARE
Goal Outcome Evaluation:  Plan of Care Reviewed With: (P) patient, family        Progress: (P) no change  Outcome Evaluation: (P) OT tx completed. Pt presented sidelying in bed upon entering and was A&O x4. Pt was not in a pleasant mood this date and demonstrated to be very frustrated to answer orientation questions. He agreed to participate in therapy with increased motivation from family. Initially obtained orthostatics to begin session. Pt's BP in supine was 110/57. Pt was able to transition from geohtnhpu-zw-gckwhll EOB with Reny. Pt reported feeling dizzy in sitting, requiring CGA for sitting balance. BP was assessed in sitting and was 92/74. Pt reported that he was not able to stand this date due to feeling dizzy. Pt transitioned back into sidelying with CGA. BP was assessed again back in sidelying and was 97/60. Pt was ended early due to imaging arriving to take pt. Mr. Recinos would benefit from continued skilled OT per POC. Recommend d/c home with assist.    Anticipated Discharge Disposition (OT): (P) home with assist

## 2025-08-01 NOTE — PROGRESS NOTES
NEUROSURGERY DAILY PROGRESS NOTE    ASSESSMENT:   Porfirio Gates is a 69 y.o. male with a significant comorbidity of known lung mass, hypertension, type 2 diabetes, class I obesity, pulmonary emphysema. He presents with a new problem of 7 out of 10 back pain. Physical exam findings of bilateral Babinski and hyperreflexia in his lower extremities with increased tone in his lower extremities as well as marked cervical kyphosis.  His imaging shows lytic lesions to L1 and T11 with L1 suggesting canal compromise and violation of the posterior wall of the spinal column on noncontrast CT.     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  Exam at baseline   3 Days Post-Op (7/29/2025) posterior mentation T3-L3 with L1 corpectomy   KATARZYNA = 100 mL, removed   PO x-rays pending   Neurochecks every 4 hours.  Call for decline   Transfer to floor today    CV: Intermittent SVT.   Appreciate cardiology.  Pulse rate , SBP   Pulm: Maintaining O2 sat.  Continuous pulse oximetry.  Incentive spirometry.  Chest x-ray unremarkable.    : Voiding spontaneously.  Bladder scans and I/O cath per policy.   ENDO: Accu-Chek and treat per policy  FEN: Tolerating regular diet.  Repeat NS 1 L bolus  GI: No acute issues.    ID: 23-hour  "postoperative prophylactic antibiotics complete.  Wound healing protocol.  Heme:  DVT prophylaxis; SCD's.   Pain: Tolerable with oral meds.  Dispo: PT/OT.      Transfer to floor today    CHIEF COMPLAINT:   Back pain secondary to metastatic cancer    Subjective  Stable    Temp:  [98 °F (36.7 °C)-99.5 °F (37.5 °C)] 98 °F (36.7 °C)  Heart Rate:  [] 82  Resp:  [15-22] 16  BP: ()/(53-97) 115/58    Objective:  Vital signs: (most recent): Blood pressure 115/58, pulse 82, temperature 98 °F (36.7 °C), temperature source Oral, resp. rate 16, height 175 cm (68.9\"), weight 99 kg (218 lb 4.1 oz), SpO2 97%.      Neurological Exam  Mental Status  Awake, alert and oriented to person, place and time. Speech is normal. Language is fluent with no aphasia. Attention and concentration are normal.    Cranial Nerves  CN II: Visual acuity is normal.  CN III, IV, VI: Extraocular movements intact bilaterally. Normal lids and orbits bilaterally. Pupils equal round and reactive to light bilaterally.  CN V: Facial sensation is normal.  CN VII: Full and symmetric facial movement.  CN IX, X: Palate elevates symmetrically  CN XI: Shoulder shrug strength is normal.    Motor  Normal muscle bulk throughout. Normal muscle tone.                                               Right                     Left  Toe extension                        5                          5                                             Right                     Left  Deltoid                                   5                          5   Biceps                                   5                          5   Triceps                                  5                          5   Wrist extensor                       5                          5   Finger flexor                          5                          5   Iliopsoas                               5                          5   Quadriceps                           5                          5 "   Gastrocnemius                     5                           5   Anterior tibialis                      5                          5    Sensory  Light touch is normal in upper and lower extremities.     Coordination    Finger-to-nose, rapid alternating movements and heel-to-shin normal bilaterally without dysmetria.    Gait    Did not assess.  Defer to PT.    Drains:   Closed/Suction Drain 1 Posterior Back Bulb 7 Fr. (Active)   Site Description Unable to view 07/30/25 0400   Dressing Status Clean;Intact;Dry 07/30/25 0400   Drainage Appearance Bloody 07/30/25 0400   Status To bulb suction 07/30/25 0400   Output (mL) 60 mL 07/30/25 0400       [REMOVED] Urethral Catheter Silicone 16 Fr. (Removed)   Daily Indications Required activity restriction from trauma, surgery, (e.g. unstable spine, fracture, hemodynamics) 07/30/25 0400   Site Assessment Skin intact;Clean 07/30/25 0400   Collection Container Standard drainage bag 07/30/25 0400   Securement Method Securing device 07/30/25 0400   Catheter care complete Yes 07/29/25 2009   Output (mL) 225 mL 07/30/25 0609       Output by Drain (mL) 07/31/25 0701 - 07/31/25 1900 07/31/25 1901 - 08/01/25 0700 08/01/25 0701 - 08/01/25 0911 Range Total   Closed/Suction Drain 1 Posterior Back Bulb 7 Fr. 30 70 20 120     Imaging Results (Last 24 Hours)       ** No results found for the last 24 hours. **          Lab Results (last 24 hours)       Procedure Component Value Units Date/Time    POC Glucose 4x Daily Before Meals & at Bedtime [[id number]]  (Normal) Collected: 08/01/25 0705    Specimen: Blood Updated: 08/01/25 0707     Glucose 88 mg/dL      Comment: Serial Number: [id number]:  [id number]       Comprehensive Metabolic Panel [[id number]]  (Abnormal) Collected: 08/01/25 0042    Specimen: Blood Updated: 08/01/25 0106     Glucose 89 mg/dL      BUN 17.1 mg/dL      Creatinine 0.84 mg/dL      Sodium 142 mmol/L      Potassium 4.2 mmol/L      Chloride 109 mmol/L      CO2 24.0 mmol/L       Calcium 8.4 mg/dL      Total Protein 6.0 g/dL      Albumin 3.1 g/dL      ALT (SGPT) 21 U/L      AST (SGOT) 52 U/L      Alkaline Phosphatase 52 U/L      Total Bilirubin 0.3 mg/dL      Globulin 2.9 gm/dL      A/G Ratio 1.1 g/dL      BUN/Creatinine Ratio 20.4     Anion Gap 9.0 mmol/L      eGFR 95.0 mL/min/1.73     Narrative:      GFR Categories in Chronic Kidney Disease (CKD)              GFR Category          GFR (mL/min/1.73)    Interpretation  G1                    90 or greater        Normal or high (1)  G2                    60-89                Mild decrease (1)  G3a                   45-59                Mild to moderate decrease  G3b                   30-44                Moderate to severe decrease  G4                    15-29                Severe decrease  G5                    14 or less           Kidney failure    (1)In the absence of evidence of kidney disease, neither GFR category G1 or G2 fulfill the criteria for CKD.    eGFR calculation 2021 CKD-EPI creatinine equation, which does not include race as a factor    CBC Auto Differential [[id number]]  (Abnormal) Collected: 08/01/25 0042    Specimen: Blood Updated: 08/01/25 0053     WBC 17.42 10*3/mm3      RBC 3.02 10*6/mm3      Hemoglobin 8.9 g/dL      Hematocrit 26.6 %      MCV 88.1 fL      MCH 29.5 pg      MCHC 33.5 g/dL      RDW 13.6 %      RDW-SD 43.2 fl      MPV 10.7 fL      Platelets 232 10*3/mm3      Neutrophil % 74.3 %      Lymphocyte % 15.0 %      Monocyte % 9.5 %      Eosinophil % 0.3 %      Basophil % 0.3 %      Immature Grans % 0.6 %      Neutrophils, Absolute 12.94 10*3/mm3      Lymphocytes, Absolute 2.61 10*3/mm3      Monocytes, Absolute 1.65 10*3/mm3      Eosinophils, Absolute 0.06 10*3/mm3      Basophils, Absolute 0.05 10*3/mm3      Immature Grans, Absolute 0.11 10*3/mm3      nRBC 0.2 /100 WBC     POC Glucose Once [[id number]]  (Abnormal) Collected: 07/31/25 2005    Specimen: Blood Updated: 07/31/25 2007     Glucose 178 mg/dL       Comment: Serial Number: [id number]:  [id number]       POC Glucose 4x Daily Before Meals & at Bedtime [[id number]]  (Normal) Collected: 07/31/25 1816    Specimen: Blood Updated: 07/31/25 1818     Glucose 111 mg/dL      Comment: Serial Number: [id number]:  [id number]       Tissue Pathology Exam [[id number]] Collected: 07/29/25 1742    Specimen: Tissue from Back, Lower Updated: 07/31/25 1523     Note to Patients --     This report may contain a detailed description of human tissue sent by a health care provider to the laboratory for pathologic evaluation. The content of this report is essential for diagnosis and may provide important critical findings. This information may be unfamiliar to patients to review without a medical professional present. It is advised that the patient review this report in the presence of a health care provider who can answer questions and explain the results.       Case Report --     Surgical Pathology Report                         Case: [id number]                                  Authorizing Provider:  Endy Marte MD Collected:           07/29/2025 05:42 PM          Ordering Location:     [facility] OR  Received:            07/30/2025 08:32 AM          Pathologist:           Gavino Canseco MD                                                                           Specimen:    Back, Lower, THORACIC/LUMBAR SPINE TUMOR                                                    Final Diagnosis --     1. Bone and cartilage, thoracic/lumbar spine tumor (biopsy):  - Metastatic adenocarcinoma. See comment.         Comment --     The tumor cells are positive for CK7 (strong, diffuse) and CDX2 (patchy, weak to moderate), and are negative for CK20, TTF-1 (8G7G3/1), Napsin A, and NKX3.1. Overall, the morphology along with this immunoprofile would favor metastasis from an upper  "gastrointestinal (GI) primary (e.g., esophagus, stomach, small bowel) or pancreaticobiliary primary. Rarely, lung adenocarcinomas can also show an enteric phenotype. Correlation with clinical and radiographic findings (e.g., PET scan) is needed. If radiographic imaging is noncontributory, upper GI endoscopy may be useful.       Gross Description --     1. Back, Lower.  Received in a formalin filled container labeled with the patient's name, medical record number and \"thoracic/lumbar spine tumor\" are multiple fragments of tan-gray tissue measuring 5 x 4.7 x 3.8 cm in aggregate.  Representative sections are submitted in cassettes labeled 1A-1C which are placed in Immunocal solution prior to processing.          Potassium [[id number]]  (Abnormal) Collected: 07/31/25 1443    Specimen: Blood Updated: 07/31/25 1459     Potassium 3.3 mmol/L     POC Glucose 4x Daily Before Meals & at Bedtime [[id number]]  (Normal) Collected: 07/31/25 1122    Specimen: Blood Updated: 07/31/25 1124     Glucose 119 mg/dL      Comment: Serial Number: [id number]:  [id number]             [id number]    KACEY Nunez   "

## 2025-08-02 ENCOUNTER — APPOINTMENT (OUTPATIENT)
Dept: GENERAL RADIOLOGY | Facility: HOSPITAL | Age: 70
End: 2025-08-02
Payer: MEDICARE

## 2025-08-02 VITALS
DIASTOLIC BLOOD PRESSURE: 62 MMHG | HEIGHT: 69 IN | HEART RATE: 78 BPM | OXYGEN SATURATION: 98 % | SYSTOLIC BLOOD PRESSURE: 112 MMHG | BODY MASS INDEX: 32.33 KG/M2 | TEMPERATURE: 97.9 F | RESPIRATION RATE: 18 BRPM | WEIGHT: 218.26 LBS

## 2025-08-02 LAB
ALBUMIN SERPL-MCNC: 2.9 G/DL (ref 3.5–5.2)
ALBUMIN/GLOB SERPL: 1.1 G/DL
ALP SERPL-CCNC: 50 U/L (ref 39–117)
ALT SERPL W P-5'-P-CCNC: 24 U/L (ref 1–41)
ANION GAP SERPL CALCULATED.3IONS-SCNC: 9 MMOL/L (ref 5–15)
AST SERPL-CCNC: 48 U/L (ref 1–40)
BASOPHILS # BLD AUTO: 0.06 10*3/MM3 (ref 0–0.2)
BASOPHILS NFR BLD AUTO: 0.5 % (ref 0–1.5)
BH BB BLOOD EXPIRATION DATE: NORMAL
BH BB BLOOD EXPIRATION DATE: NORMAL
BH BB BLOOD TYPE BARCODE: 6200
BH BB BLOOD TYPE BARCODE: 6200
BH BB DISPENSE STATUS: NORMAL
BH BB DISPENSE STATUS: NORMAL
BH BB PRODUCT CODE: NORMAL
BH BB PRODUCT CODE: NORMAL
BH BB UNIT NUMBER: NORMAL
BH BB UNIT NUMBER: NORMAL
BILIRUB SERPL-MCNC: 0.3 MG/DL (ref 0–1.2)
BUN SERPL-MCNC: 16.8 MG/DL (ref 8–23)
BUN/CREAT SERPL: 20 (ref 7–25)
CALCIUM SPEC-SCNC: 8.2 MG/DL (ref 8.6–10.5)
CHLORIDE SERPL-SCNC: 109 MMOL/L (ref 98–107)
CO2 SERPL-SCNC: 23 MMOL/L (ref 22–29)
CREAT SERPL-MCNC: 0.84 MG/DL (ref 0.76–1.27)
CROSSMATCH INTERPRETATION: NORMAL
CROSSMATCH INTERPRETATION: NORMAL
DEPRECATED RDW RBC AUTO: 43 FL (ref 37–54)
EGFRCR SERPLBLD CKD-EPI 2021: 95 ML/MIN/1.73
EOSINOPHIL # BLD AUTO: 0.21 10*3/MM3 (ref 0–0.4)
EOSINOPHIL NFR BLD AUTO: 1.8 % (ref 0.3–6.2)
ERYTHROCYTE [DISTWIDTH] IN BLOOD BY AUTOMATED COUNT: 13.8 % (ref 12.3–15.4)
GLOBULIN UR ELPH-MCNC: 2.7 GM/DL
GLUCOSE BLDC GLUCOMTR-MCNC: 86 MG/DL (ref 70–130)
GLUCOSE BLDC GLUCOMTR-MCNC: 92 MG/DL (ref 70–130)
GLUCOSE SERPL-MCNC: 90 MG/DL (ref 65–99)
HCT VFR BLD AUTO: 24.2 % (ref 37.5–51)
HGB BLD-MCNC: 8 G/DL (ref 13–17.7)
IMM GRANULOCYTES # BLD AUTO: 0.06 10*3/MM3 (ref 0–0.05)
IMM GRANULOCYTES NFR BLD AUTO: 0.5 % (ref 0–0.5)
LYMPHOCYTES # BLD AUTO: 1.91 10*3/MM3 (ref 0.7–3.1)
LYMPHOCYTES NFR BLD AUTO: 16.6 % (ref 19.6–45.3)
MCH RBC QN AUTO: 29.4 PG (ref 26.6–33)
MCHC RBC AUTO-ENTMCNC: 33.1 G/DL (ref 31.5–35.7)
MCV RBC AUTO: 89 FL (ref 79–97)
MONOCYTES # BLD AUTO: 0.95 10*3/MM3 (ref 0.1–0.9)
MONOCYTES NFR BLD AUTO: 8.3 % (ref 5–12)
NEUTROPHILS NFR BLD AUTO: 72.3 % (ref 42.7–76)
NEUTROPHILS NFR BLD AUTO: 8.31 10*3/MM3 (ref 1.7–7)
NRBC BLD AUTO-RTO: 0 /100 WBC (ref 0–0.2)
PLATELET # BLD AUTO: 221 10*3/MM3 (ref 140–450)
PMV BLD AUTO: 10.8 FL (ref 6–12)
POTASSIUM SERPL-SCNC: 3.5 MMOL/L (ref 3.5–5.2)
PROT SERPL-MCNC: 5.6 G/DL (ref 6–8.5)
RBC # BLD AUTO: 2.72 10*6/MM3 (ref 4.14–5.8)
SODIUM SERPL-SCNC: 141 MMOL/L (ref 136–145)
UNIT  ABO: NORMAL
UNIT  ABO: NORMAL
UNIT  RH: NORMAL
UNIT  RH: NORMAL
WBC NRBC COR # BLD AUTO: 11.5 10*3/MM3 (ref 3.4–10.8)

## 2025-08-02 PROCEDURE — 25010000002 HEPARIN (PORCINE) PER 1000 UNITS: Performed by: NURSE PRACTITIONER

## 2025-08-02 PROCEDURE — 80053 COMPREHEN METABOLIC PANEL: CPT | Performed by: NURSE PRACTITIONER

## 2025-08-02 PROCEDURE — 72082 X-RAY EXAM ENTIRE SPI 2/3 VW: CPT

## 2025-08-02 PROCEDURE — 82948 REAGENT STRIP/BLOOD GLUCOSE: CPT

## 2025-08-02 PROCEDURE — 85025 COMPLETE CBC W/AUTO DIFF WBC: CPT | Performed by: NURSE PRACTITIONER

## 2025-08-02 PROCEDURE — 82948 REAGENT STRIP/BLOOD GLUCOSE: CPT | Performed by: NURSE PRACTITIONER

## 2025-08-02 RX ORDER — POLYETHYLENE GLYCOL 3350 17 G/17G
17 POWDER, FOR SOLUTION ORAL DAILY
Qty: 30 PACKET | Refills: 0 | Status: SHIPPED | OUTPATIENT
Start: 2025-08-03

## 2025-08-02 RX ORDER — OXYCODONE AND ACETAMINOPHEN 10; 325 MG/1; MG/1
1 TABLET ORAL EVERY 6 HOURS PRN
Qty: 20 TABLET | Refills: 0 | Status: SHIPPED | OUTPATIENT
Start: 2025-08-02 | End: 2025-08-07

## 2025-08-02 RX ORDER — METOPROLOL TARTRATE 25 MG/1
12.5 TABLET, FILM COATED ORAL EVERY 12 HOURS SCHEDULED
Qty: 30 TABLET | Refills: 5 | Status: SHIPPED | OUTPATIENT
Start: 2025-08-02 | End: 2025-09-01

## 2025-08-02 RX ORDER — POTASSIUM CHLORIDE 1500 MG/1
40 TABLET, EXTENDED RELEASE ORAL EVERY 4 HOURS
Status: DISCONTINUED | OUTPATIENT
Start: 2025-08-02 | End: 2025-08-02 | Stop reason: HOSPADM

## 2025-08-02 RX ORDER — LIDOCAINE 4 G/G
1 PATCH TOPICAL
Qty: 15 EACH | Refills: 0 | Status: SHIPPED | OUTPATIENT
Start: 2025-08-03

## 2025-08-02 RX ORDER — GABAPENTIN 100 MG/1
100 CAPSULE ORAL 3 TIMES DAILY
Qty: 90 CAPSULE | Refills: 0 | Status: SHIPPED | OUTPATIENT
Start: 2025-08-02 | End: 2025-09-01

## 2025-08-02 RX ORDER — CYCLOBENZAPRINE HCL 10 MG
10 TABLET ORAL 3 TIMES DAILY PRN
Qty: 45 TABLET | Refills: 0 | Status: SHIPPED | OUTPATIENT
Start: 2025-08-02 | End: 2025-08-17

## 2025-08-02 RX ORDER — VITAMIN A 3000 MCG
20000 CAPSULE ORAL DAILY
Qty: 4 CAPSULE | Refills: 0 | Status: SHIPPED | OUTPATIENT
Start: 2025-08-03 | End: 2025-08-05

## 2025-08-02 RX ORDER — ZINC SULFATE 50(220)MG
220 CAPSULE ORAL DAILY
Qty: 2 CAPSULE | Refills: 0 | Status: SHIPPED | OUTPATIENT
Start: 2025-08-03 | End: 2025-08-05

## 2025-08-02 RX ADMIN — OXYCODONE HYDROCHLORIDE AND ACETAMINOPHEN 1000 MG: 500 TABLET ORAL at 08:01

## 2025-08-02 RX ADMIN — Medication 1 APPLICATION: at 08:05

## 2025-08-02 RX ADMIN — OXYCODONE AND ACETAMINOPHEN 1 TABLET: 325; 10 TABLET ORAL at 05:21

## 2025-08-02 RX ADMIN — Medication 220 MG: at 08:04

## 2025-08-02 RX ADMIN — POTASSIUM CHLORIDE 40 MEQ: 1500 TABLET, EXTENDED RELEASE ORAL at 07:58

## 2025-08-02 RX ADMIN — METFORMIN HYDROCHLORIDE 500 MG: 500 TABLET, EXTENDED RELEASE ORAL at 07:58

## 2025-08-02 RX ADMIN — NICOTINE 1 PATCH: 7 PATCH, EXTENDED RELEASE TRANSDERMAL at 08:00

## 2025-08-02 RX ADMIN — Medication 20000 UNITS: at 08:00

## 2025-08-02 RX ADMIN — METOPROLOL TARTRATE 12.5 MG: 25 TABLET, FILM COATED ORAL at 08:02

## 2025-08-02 RX ADMIN — LIDOCAINE 1 PATCH: 4 PATCH TOPICAL at 08:00

## 2025-08-02 RX ADMIN — CYCLOBENZAPRINE HYDROCHLORIDE 10 MG: 10 TABLET, FILM COATED ORAL at 00:14

## 2025-08-02 RX ADMIN — ASPIRIN 81 MG: 81 TABLET, COATED ORAL at 08:04

## 2025-08-02 RX ADMIN — DOCUSATE SODIUM 50 MG AND SENNOSIDES 8.6 MG 2 TABLET: 8.6; 5 TABLET, FILM COATED ORAL at 08:04

## 2025-08-02 RX ADMIN — OXYCODONE AND ACETAMINOPHEN 1 TABLET: 325; 10 TABLET ORAL at 00:17

## 2025-08-02 RX ADMIN — GABAPENTIN 100 MG: 100 CAPSULE ORAL at 08:04

## 2025-08-02 RX ADMIN — HEPARIN SODIUM 5000 UNITS: 5000 INJECTION INTRAVENOUS; SUBCUTANEOUS at 05:20

## 2025-08-02 RX ADMIN — Medication 10 ML: at 08:05

## 2025-08-02 NOTE — PROGRESS NOTES
Continued Stay Note  [facility]     Patient Name: Porfirio Gates  MRN: [id number]  Today's Date: 8/2/2025    Admit Date: 7/29/2025    Plan: Plan for home health   Discharge Plan       Row Name 08/02/25 4494       Plan    Plan Plan for home health    Patient/Family in Agreement with Plan yes    Provided Post Acute Provider List? Refused    Provided Post Acute Provider Quality & Resource List? Refused    Plan Comments [facility] called and left message for patient. Then call and spoke to daughter. Explained home health and verfied patient's demographics. Patient/famiy did not have a HH preference. [facility] is out of service area and upable to accept. The other options for where patient lives is [facility] and [facility] I have placed home health request in [facility], awaiting answer. Once we have an accepting HH, will reach out to daughter to update.      Row Name 08/02/25 1646       Plan    Plan Plan for home health; [facility] notified of patient discharge pending home health choice    Plan Comments [facility] following for home health. Pending home health choice from patient.                   Discharge Codes    No documentation.                 Expected Discharge Date and Time       Expected Discharge Date Expected Discharge Time    Aug 2, 2025               Zaida Newman RN

## 2025-08-02 NOTE — PROGRESS NOTES
[facility] -  Progress Note     LOS: 4 days   Patient Care Team:  Jenaro Graff MD as PCP - General (Internal Medicine)  Neftali Pereira MD as Consulting Physician (Urology)  [facility], [place]  Thiago Saul DO as Consulting Physician (Pulmonary Disease)  Endy Marte MD as Surgeon (Neurosurgery)    Chief Complaint: Follow-up tachycardia    Subjective     Interval History:   At the time my examination the patient is doing well.  He denies any cardiac complaints.  Denies any palpitations or fluttering.  He has been discharged by his attending service and plans to go home.    Review of Systems:     Review of Systems   All other systems reviewed and are negative.    Objective     Vital Sign Min/Max for last 24 hours  Temp  Min: 97.9 °F (36.6 °C)  Max: 99 °F (37.2 °C)   BP  Min: 87/62  Max: 146/68   Pulse  Min: 75  Max: 122   Resp  Min: 16  Max: 18   SpO2  Min: 95 %  Max: 100 %   No data recorded   No data recorded         08/01/25  0600   Weight: 99 kg (218 lb 4.1 oz)       Telemetry: Sinus rhythm with sinus tachycardia with rates predominantly in the 80s to low 100s      Physical Exam:    Constitutional:       Appearance: Healthy appearance. Not in distress.   Pulmonary:      Effort: Pulmonary effort is normal.      Breath sounds: Normal breath sounds and air entry.   Cardiovascular:      PMI at left midclavicular line. Normal rate. Regular rhythm. Normal S1. Normal S2.       Murmurs: There is a grade 2/6 systolic murmur.      No gallop.  No click. No rub.   Pulses:     Intact distal pulses.   Edema:     Peripheral edema absent.   Neurological:      Mental Status: Alert and oriented to person, place and time.       Results Review:   Lab Results (last 72 hours)       Procedure Component Value Units Date/Time    POC Glucose 4x Daily Before Meals & at Bedtime [[id number]]  (Normal) Collected: 08/02/25 0740    Specimen: Blood Updated: 08/02/25 0742      Glucose 86 mg/dL      Comment: Serial Number: [id number]:  [id number]       Comprehensive Metabolic Panel [[id number]]  (Abnormal) Collected: 08/02/25 0419    Specimen: Blood Updated: 08/02/25 0553     Glucose 90 mg/dL      BUN 16.8 mg/dL      Creatinine 0.84 mg/dL      Sodium 141 mmol/L      Potassium 3.5 mmol/L      Chloride 109 mmol/L      CO2 23.0 mmol/L      Calcium 8.2 mg/dL      Total Protein 5.6 g/dL      Albumin 2.9 g/dL      ALT (SGPT) 24 U/L      AST (SGOT) 48 U/L      Alkaline Phosphatase 50 U/L      Total Bilirubin 0.3 mg/dL      Globulin 2.7 gm/dL      A/G Ratio 1.1 g/dL      BUN/Creatinine Ratio 20.0     Anion Gap 9.0 mmol/L      eGFR 95.0 mL/min/1.73     Narrative:      GFR Categories in Chronic Kidney Disease (CKD)              GFR Category          GFR (mL/min/1.73)    Interpretation  G1                    90 or greater        Normal or high (1)  G2                    60-89                Mild decrease (1)  G3a                   45-59                Mild to moderate decrease  G3b                   30-44                Moderate to severe decrease  G4                    15-29                Severe decrease  G5                    14 or less           Kidney failure    (1)In the absence of evidence of kidney disease, neither GFR category G1 or G2 fulfill the criteria for CKD.    eGFR calculation 2021 CKD-EPI creatinine equation, which does not include race as a factor    CBC Auto Differential [[id number]]  (Abnormal) Collected: 08/02/25 0419    Specimen: Blood Updated: 08/02/25 0529     WBC 11.50 10*3/mm3      RBC 2.72 10*6/mm3      Hemoglobin 8.0 g/dL      Hematocrit 24.2 %      MCV 89.0 fL      MCH 29.4 pg      MCHC 33.1 g/dL      RDW 13.8 %      RDW-SD 43.0 fl      MPV 10.8 fL      Platelets 221 10*3/mm3      Neutrophil % 72.3 %      Lymphocyte % 16.6 %      Monocyte % 8.3 %      Eosinophil % 1.8 %      Basophil % 0.5 %      Immature Grans % 0.5 %      Neutrophils, Absolute 8.31 10*3/mm3       Lymphocytes, Absolute 1.91 10*3/mm3      Monocytes, Absolute 0.95 10*3/mm3      Eosinophils, Absolute 0.21 10*3/mm3      Basophils, Absolute 0.06 10*3/mm3      Immature Grans, Absolute 0.06 10*3/mm3      nRBC 0.0 /100 WBC     POC Glucose Once [[id number]]  (Normal) Collected: 08/02/25 0015    Specimen: Blood Updated: 08/02/25 0018     Glucose 92 mg/dL      Comment: Serial Number: [id number]:  [id number]       POC Glucose Once [[id number]]  (Normal) Collected: 08/01/25 2021    Specimen: Blood Updated: 08/01/25 2023     Glucose 91 mg/dL      Comment: Serial Number: [id number]:  [id number]       POC Glucose Once [[id number]]  (Normal) Collected: 08/01/25 1646    Specimen: Blood Updated: 08/01/25 1649     Glucose 106 mg/dL      Comment: Serial Number: [id number]:  [id number]       POC Glucose 4x Daily Before Meals & at Bedtime [[id number]]  (Normal) Collected: 08/01/25 1134    Specimen: Blood Updated: 08/01/25 1136     Glucose 99 mg/dL      Comment: Serial Number: [id number]:  [id number]       POC Glucose 4x Daily Before Meals & at Bedtime [[id number]]  (Normal) Collected: 08/01/25 0705    Specimen: Blood Updated: 08/01/25 0707     Glucose 88 mg/dL      Comment: Serial Number: [id number]:  [id number]       Comprehensive Metabolic Panel [[id number]]  (Abnormal) Collected: 08/01/25 0042    Specimen: Blood Updated: 08/01/25 0106     Glucose 89 mg/dL      BUN 17.1 mg/dL      Creatinine 0.84 mg/dL      Sodium 142 mmol/L      Potassium 4.2 mmol/L      Chloride 109 mmol/L      CO2 24.0 mmol/L      Calcium 8.4 mg/dL      Total Protein 6.0 g/dL      Albumin 3.1 g/dL      ALT (SGPT) 21 U/L      AST (SGOT) 52 U/L      Alkaline Phosphatase 52 U/L      Total Bilirubin 0.3 mg/dL      Globulin 2.9 gm/dL      A/G Ratio 1.1 g/dL      BUN/Creatinine Ratio 20.4     Anion Gap 9.0 mmol/L      eGFR 95.0 mL/min/1.73     Narrative:      GFR Categories in Chronic Kidney Disease (CKD)              GFR Category           GFR (mL/min/1.73)    Interpretation  G1                    90 or greater        Normal or high (1)  G2                    60-89                Mild decrease (1)  G3a                   45-59                Mild to moderate decrease  G3b                   30-44                Moderate to severe decrease  G4                    15-29                Severe decrease  G5                    14 or less           Kidney failure    (1)In the absence of evidence of kidney disease, neither GFR category G1 or G2 fulfill the criteria for CKD.    eGFR calculation 2021 CKD-EPI creatinine equation, which does not include race as a factor    CBC Auto Differential [[id number]]  (Abnormal) Collected: 08/01/25 0042    Specimen: Blood Updated: 08/01/25 0053     WBC 17.42 10*3/mm3      RBC 3.02 10*6/mm3      Hemoglobin 8.9 g/dL      Hematocrit 26.6 %      MCV 88.1 fL      MCH 29.5 pg      MCHC 33.5 g/dL      RDW 13.6 %      RDW-SD 43.2 fl      MPV 10.7 fL      Platelets 232 10*3/mm3      Neutrophil % 74.3 %      Lymphocyte % 15.0 %      Monocyte % 9.5 %      Eosinophil % 0.3 %      Basophil % 0.3 %      Immature Grans % 0.6 %      Neutrophils, Absolute 12.94 10*3/mm3      Lymphocytes, Absolute 2.61 10*3/mm3      Monocytes, Absolute 1.65 10*3/mm3      Eosinophils, Absolute 0.06 10*3/mm3      Basophils, Absolute 0.05 10*3/mm3      Immature Grans, Absolute 0.11 10*3/mm3      nRBC 0.2 /100 WBC     POC Glucose Once [[id number]]  (Abnormal) Collected: 07/31/25 2005    Specimen: Blood Updated: 07/31/25 2007     Glucose 178 mg/dL      Comment: Serial Number: [id number]:  [id number]       POC Glucose 4x Daily Before Meals & at Bedtime [[id number]]  (Normal) Collected: 07/31/25 1816    Specimen: Blood Updated: 07/31/25 1818     Glucose 111 mg/dL      Comment: Serial Number: [id number]:  [id number]       Tissue Pathology Exam [[id number]] Collected: 07/29/25 1742    Specimen: Tissue from Back, Lower Updated: 07/31/25 1523     Note to Patients  --     This report may contain a detailed description of human tissue sent by a health care provider to the laboratory for pathologic evaluation. The content of this report is essential for diagnosis and may provide important critical findings. This information may be unfamiliar to patients to review without a medical professional present. It is advised that the patient review this report in the presence of a health care provider who can answer questions and explain the results.       Case Report --     Surgical Pathology Report                         Case: [id number]                                  Authorizing Provider:  Endy Marte MD Collected:           07/29/2025 05:42 PM          Ordering Location:     [facility] OR  Received:            07/30/2025 08:32 AM          Pathologist:           Gavino Canseco MD                                                                           Specimen:    Back, Lower, THORACIC/LUMBAR SPINE TUMOR                                                    Final Diagnosis --     1. Bone and cartilage, thoracic/lumbar spine tumor (biopsy):  - Metastatic adenocarcinoma. See comment.         Comment --     The tumor cells are positive for CK7 (strong, diffuse) and CDX2 (patchy, weak to moderate), and are negative for CK20, TTF-1 (8G7G3/1), Napsin A, and NKX3.1. Overall, the morphology along with this immunoprofile would favor metastasis from an upper gastrointestinal (GI) primary (e.g., esophagus, stomach, small bowel) or pancreaticobiliary primary. Rarely, lung adenocarcinomas can also show an enteric phenotype. Correlation with clinical and radiographic findings (e.g., PET scan) is needed. If radiographic imaging is noncontributory, upper GI endoscopy may be useful.       Gross Description --     1. Back, Lower.  Received in a formalin filled container labeled with the  "patient's name, medical record number and \"thoracic/lumbar spine tumor\" are multiple fragments of tan-gray tissue measuring 5 x 4.7 x 3.8 cm in aggregate.  Representative sections are submitted in cassettes labeled 1A-1C which are placed in Immunocal solution prior to processing.          Potassium [[id number]]  (Abnormal) Collected: 07/31/25 1443    Specimen: Blood Updated: 07/31/25 1459     Potassium 3.3 mmol/L     POC Glucose 4x Daily Before Meals & at Bedtime [[id number]]  (Normal) Collected: 07/31/25 1122    Specimen: Blood Updated: 07/31/25 1124     Glucose 119 mg/dL      Comment: Serial Number: [id number]:  [id number]       POC Glucose 4x Daily Before Meals & at Bedtime [[id number]]  (Abnormal) Collected: 07/31/25 0731    Specimen: Blood Updated: 07/31/25 0733     Glucose 131 mg/dL      Comment: Serial Number: [id number]:  [id number]       CBC Auto Differential [[id number]]  (Abnormal) Collected: 07/31/25 0639    Specimen: Blood Updated: 07/31/25 0709     WBC 17.83 10*3/mm3      RBC 3.26 10*6/mm3      Hemoglobin 9.6 g/dL      Hematocrit 31.1 %      MCV 95.4 fL      MCH 29.4 pg      MCHC 30.9 g/dL      RDW 13.3 %      RDW-SD 46.4 fl      MPV 10.4 fL      Platelets 200 10*3/mm3      Neutrophil % 80.4 %      Lymphocyte % 10.7 %      Monocyte % 7.6 %      Eosinophil % 0.3 %      Basophil % 0.3 %      Immature Grans % 0.7 %      Neutrophils, Absolute 14.34 10*3/mm3      Lymphocytes, Absolute 1.90 10*3/mm3      Monocytes, Absolute 1.36 10*3/mm3      Eosinophils, Absolute 0.06 10*3/mm3      Basophils, Absolute 0.05 10*3/mm3      Immature Grans, Absolute 0.12 10*3/mm3      nRBC 0.0 /100 WBC     Comprehensive Metabolic Panel [[id number]]  (Abnormal) Collected: 07/31/25 0416    Specimen: Blood Updated: 07/31/25 0548     Glucose 135 mg/dL      BUN 14.8 mg/dL      Creatinine 0.89 mg/dL      Sodium 142 mmol/L      Potassium 3.4 mmol/L      Chloride 105 mmol/L      CO2 24.0 mmol/L      Calcium 8.7 mg/dL      Total " "Protein 6.3 g/dL      Albumin 3.3 g/dL      ALT (SGPT) 21 U/L      AST (SGOT) 59 U/L      Alkaline Phosphatase 57 U/L      Total Bilirubin 0.6 mg/dL      Globulin 3.0 gm/dL      A/G Ratio 1.1 g/dL      BUN/Creatinine Ratio 16.6     Anion Gap 13.0 mmol/L      eGFR 93.3 mL/min/1.73     Narrative:      GFR Categories in Chronic Kidney Disease (CKD)              GFR Category          GFR (mL/min/1.73)    Interpretation  G1                    90 or greater        Normal or high (1)  G2                    60-89                Mild decrease (1)  G3a                   45-59                Mild to moderate decrease  G3b                   30-44                Moderate to severe decrease  G4                    15-29                Severe decrease  G5                    14 or less           Kidney failure    (1)In the absence of evidence of kidney disease, neither GFR category G1 or G2 fulfill the criteria for CKD.    eGFR calculation 2021 CKD-EPI creatinine equation, which does not include race as a factor    POC Glucose Once [[id number]]  (Normal) Collected: 07/30/25 2017    Specimen: Blood Updated: 07/30/25 2022     Glucose 122 mg/dL      Comment: Serial Number: [id number]:  [id number]       POC Glucose 4x Daily Before Meals & at Bedtime [[id number]]  (Normal) Collected: 07/30/25 1815    Specimen: Blood Updated: 07/30/25 1818     Glucose 127 mg/dL      Comment: Serial Number: [id number]:  [id number]       POC Glucose Once [[id number]]  (Abnormal) Collected: 07/30/25 1148    Specimen: Blood Updated: 07/30/25 1151     Glucose 141 mg/dL      Comment: Serial Number: [id number]:  [id number]                   Echo EF Estimated  No results found for: \"ECHOEFEST\"      Cath Ejection Fraction Quantitative  No results found for: \"CATHEF\"        Medication Review: yes  Current Facility-Administered Medications   Medication Dose Route Frequency Provider Last Rate Last Admin    ascorbic acid (VITAMIN C) tablet 1,000 mg  " 1,000 mg Oral Daily Miguel Can APRN   1,000 mg at 08/02/25 0801    aspirin EC tablet 81 mg  81 mg Oral Daily Miguel Can APRN   81 mg at 08/02/25 0804    atorvastatin (LIPITOR) tablet 40 mg  40 mg Oral Nightly Miguel Can APRN   40 mg at 08/01/25 2018    Calcium Replacement - Follow Nurse / BPA Driven Protocol   Not Applicable PRN Miguel Can APRN        Chlorhexidine Gluconate Cloth 2 % pads 1 Application  1 Application Topical Daily Endy Marte MD   1 Application at 08/01/25 0839    cyclobenzaprine (FLEXERIL) tablet 10 mg  10 mg Oral TID PRN Ebenezer Rey DO   10 mg at 08/02/25 0014    dextrose (D50W) (25 g/50 mL) IV injection 10-50 mL  10-50 mL Intravenous Q15 Min PRN Miguel Can APRN        dextrose (GLUTOSE) oral gel 15 g  15 g Oral Q15 Min PRN Miguel Can APRN        gabapentin (NEURONTIN) capsule 100 mg  100 mg Oral TID Miguel Can APRN   100 mg at 08/02/25 0804    Glucagon (GLUCAGEN) injection 1 mg  1 mg Intramuscular Q15 Min PRN Miguel Can APRN        heparin (porcine) 5000 UNIT/ML injection 5,000 Units  5,000 Units Subcutaneous Q12H Migeul Can APRN   5,000 Units at 08/02/25 0520    insulin glargine (LANTUS, SEMGLEE) injection 1-200 Units  1-200 Units Subcutaneous Nightly - Glucommander Miguel Can APRN   17 Units at 08/01/25 2028    insulin lispro (humaLOG) injection 1-200 Units  1-200 Units Subcutaneous 4x Daily With Meals & Nightly Miguel Can APRN   5 Units at 08/01/25 1739    insulin lispro (humaLOG) injection 1-200 Units  1-200 Units Subcutaneous PRN Miguel Can APRN        Lidocaine 4 % 1 patch  1 patch Transdermal Q24H RusMiguel galeano APRN   1 patch at 08/02/25 0800    [Held by provider] losartan (COZAAR) tablet 25 mg  25 mg Oral Daily Miguel Can APRN   25 mg at 07/30/25 0806    Magnesium Standard Dose Replacement - Follow Nurse / BPA Driven Protocol   Not Applicable PRN Miguel Can APRN        melatonin tablet 10 mg  10 mg Oral Nightly  Miguel Can APRN   10 mg at 08/01/25 2018    metFORMIN ER (GLUCOPHAGE-XR) 24 hr tablet 500 mg  500 mg Oral BID With Meals Miguel Can APRN   500 mg at 08/02/25 0758    metoprolol tartrate (LOPRESSOR) tablet 12.5 mg  12.5 mg Oral Q12H Monique Murguia APRN   12.5 mg at 08/02/25 0802    mupirocin (BACTROBAN) 2 % nasal ointment 1 Application  1 Application Each Nare BID Endy Marte MD   1 Application at 08/02/25 0805    nicotine (NICODERM CQ) 7 MG/24HR patch 1 patch  1 patch Transdermal Q24H Endy Marte MD   1 patch at 08/02/25 0800    ondansetron ODT (ZOFRAN-ODT) disintegrating tablet 4 mg  4 mg Oral Q6H PRN Miguel Can APRN        Or    ondansetron (ZOFRAN) injection 4 mg  4 mg Intravenous Q6H PRN Miguel Can APRN        oxyCODONE-acetaminophen (PERCOCET)  MG per tablet 1 tablet  1 tablet Oral Q4H PRN Miguel Can APRN   1 tablet at 08/02/25 0521    oxyCODONE-acetaminophen (PERCOCET) 7.5-325 MG per tablet 1 tablet  1 tablet Oral Q4H PRN Miguel Can APRN   1 tablet at 07/31/25 1700    Phosphorus Replacement - Follow Nurse / BPA Driven Protocol   Not Applicable PRMiguel Fernandez APRN        polyethylene glycol (MIRALAX) packet 17 g  17 g Oral Daily Miguel Can APRN   17 g at 07/31/25 0928    potassium chloride (KLOR-CON M20) CR tablet 40 mEq  40 mEq Oral Q4H Endy Marte MD   40 mEq at 08/02/25 0758    Potassium Replacement - Follow Nurse / BPA Driven Protocol   Not Applicable Miguel Leal APRN        sennosides-docusate (PERICOLACE) 8.6-50 MG per tablet 2 tablet  2 tablet Oral BID Miguel Can APRN   2 tablet at 08/02/25 0804    sodium chloride 0.9 % flush 10 mL  10 mL Intravenous Q12H Miguel Can APRN   10 mL at 08/02/25 0805    sodium chloride 0.9 % flush 10 mL  10 mL Intravenous PRN Miguel Can APRN        sodium chloride 0.9 % infusion 40 mL  40 mL Intravenous PRN Miguel Can APRN        [Held by provider] tiZANidine (ZANAFLEX)  tablet 4 mg  4 mg Oral Q6H Endy Marte MD   4 mg at 07/31/25 0338    Vitamin A capsule 20,000 Units  20,000 Units Oral Daily Miguel Can APRN   20,000 Units at 08/02/25 0800    zinc sulfate (ZINCATE) capsule 220 mg  220 mg Oral Daily Miguel Can APRN   220 mg at 08/02/25 0804         Assessment & Plan     PSVT: Stable overnight  Moderate aortic valve stenosis  Nonocclusive coronary artery disease  Hypertension  Hyperlipidemia    -Patient stable overnight and ready for discharge  - Will be discharged on Lopressor 12.5 mg twice daily for rhythm suppression, consider Holter at follow-up  -It appears a stenosis has slightly worsened since previous echo, this can be monitored in the outpatient setting      Patient to be discharged home today.  1 month follow-up with Dr. Pteerson in his clinic.      Electronically signed by KACEY Hoyos, 08/02/25, 10:48 AM CDT.

## 2025-08-02 NOTE — PROGRESS NOTES
Continued Stay Note   [facility]     Patient Name: Porfirio Gates  MRN: [id number]  Today's Date: 8/2/2025    Admit Date: 7/29/2025    Plan: Plan for home health; [facility] notified of patient discharge pending home health choice   Discharge Plan       Row Name 08/02/25 1336       Plan    Plan Plan for home health; [facility] notified of patient discharge pending home health choice    Plan Comments [facility] following for home health. Pending home health choice from patient.                   Discharge Codes    No documentation.                 Expected Discharge Date and Time       Expected Discharge Date Expected Discharge Time    Aug 2, 2025               Zaida Newman RN

## 2025-08-02 NOTE — CASE MANAGEMENT/SOCIAL WORK
Continued Stay Note   [facility]     Patient Name: Porfiroi Gates  MRN: [id number]  Today's Date: 8/2/2025    Admit Date: 7/29/2025    Plan: Home w/ HH   Discharge Plan       Row Name 08/02/25 0920       Plan    Plan Home w/ HH    Patient/Family in Agreement with Plan yes    Plan Comments TONYA has contacted [facility] regarding rolling walker, and it will be delivered to the room in about an hour. TONYA has sent pt information to [facility] to set up HH.    Final Discharge Disposition Code 06 - home with home health care                   Discharge Codes    No documentation.                 Expected Discharge Date and Time       Expected Discharge Date Expected Discharge Time    Aug 2, 2025               Geoff Kang

## 2025-08-02 NOTE — DISCHARGE PLACEMENT REQUEST
"Porfirio Gates \"Jorje\" (69 y.o. Male)       Date of Birth   1955    Social Security Number       Address   [address] [place] [address]    Home Phone   [phone]    MRN   [id number]       Church   [facility]    Marital Status   Single                            Admission Date   7/29/2025    Admission Type   Elective    Admitting Provider   Endy Marte MD    Attending Provider       Department, Room/Bed   [facility] 3A, 352/1       Discharge Date   8/2/2025    Discharge Disposition   Home or Self Care    Discharge Destination                                 Attending Provider: (none)   Allergies: No Known Allergies    Isolation: None   Infection: None   Code Status: Prior    Ht: 175 cm (68.9\")   Wt: 99 kg (218 lb 4.1 oz)    Admission Cmt: None   Principal Problem: 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 [C79.51,C80.1]                   Active Insurance as of 7/29/2025       Primary Coverage       Payor Plan Insurance Group Employer/Plan Group    HU[employer] MEDICARE REPLACEMENT HU[employer] MEDICARE ADVANTAGE PPO [id number]       Payor Plan Address Payor Plan Phone Number Payor Plan Fax Number Effective Dates    [address] [address] [phone]  7/1/2024 - None Entered    [facility] [id number]         Subscriber Name Subscriber Birth Date Member ID       PORFIRIO GATES 1955 [id number]                     Emergency Contacts        (Rel.) Home Phone Work Phone Mobile Phone    Tiffanie Joiner (Sister) -- -- [phone]    héctor little (Daughter) [phone] -- --                "

## 2025-08-02 NOTE — PROGRESS NOTES
NEUROSURGERY DAILY PROGRESS NOTE    ASSESSMENT:   Porfirio Gates is a 69 y.o. male with a significant comorbidity of known lung mass, hypertension, type 2 diabetes, class I obesity, pulmonary emphysema. He presents with a new problem of 7 out of 10 back pain. Physical exam findings of bilateral Babinski and hyperreflexia in his lower extremities with increased tone in his lower extremities as well as marked cervical kyphosis.  His imaging shows lytic lesions to L1 and T11 with L1 suggesting canal compromise and violation of the posterior wall of the spinal column on noncontrast CT.     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  Exam at baseline   4 Days Post-Op (7/29/2025) posterior mentation T3-L3 with L1 corpectomy   KATARZYNA removed   PO x-rays pending   Neurochecks every 4 hours.  Call for decline   Transfer to floor today    CV: Intermittent SVT.   Appreciate cardiology.  Pulse rate , SBP   Pulm: Maintaining O2 sat.  Continuous pulse oximetry.  Incentive spirometry.  Chest x-ray unremarkable.    : Voiding spontaneously.  Bladder scans and I/O cath per policy.   ENDO: Accu-Chek and treat per policy  FEN: Tolerating regular diet.  Repeat NS 1 L bolus  GI: No acute issues.    ID: 23-hour postoperative  "prophylactic antibiotics complete.  Wound healing protocol.  Heme:  DVT prophylaxis; SCD's.   Pain: Tolerable with oral meds.  Dispo: PT/OT.      Transfer to floor today    CHIEF COMPLAINT:   Back pain secondary to metastatic cancer    Subjective  Stable    Temp:  [98 °F (36.7 °C)-99 °F (37.2 °C)] 98 °F (36.7 °C)  Heart Rate:  [] 79  Resp:  [16-18] 18  BP: ()/(46-85) 103/60    Objective:  Vital signs: (most recent): Blood pressure 112/62, pulse 78, temperature 97.9 °F (36.6 °C), temperature source Oral, resp. rate 18, height 175 cm (68.9\"), weight 99 kg (218 lb 4.1 oz), SpO2 98%.      Neurological Exam  Mental Status  Awake, alert and oriented to person, place and time. Speech is normal. Language is fluent with no aphasia. Attention and concentration are normal.    Cranial Nerves  CN II: Visual acuity is normal.  CN III, IV, VI: Extraocular movements intact bilaterally. Normal lids and orbits bilaterally. Pupils equal round and reactive to light bilaterally.  CN V: Facial sensation is normal.  CN VII: Full and symmetric facial movement.  CN IX, X: Palate elevates symmetrically  CN XI: Shoulder shrug strength is normal.    Motor  Normal muscle bulk throughout. Normal muscle tone.                                               Right                     Left  Toe extension                        5                          5                                             Right                     Left  Deltoid                                   5                          5   Biceps                                   5                          5   Triceps                                  5                          5   Wrist extensor                       5                          5   Finger flexor                          5                          5   Iliopsoas                               5                          5   Quadriceps                           5                          5   Gastrocnemius             "         5                           5   Anterior tibialis                      5                          5    Sensory  Light touch is normal in upper and lower extremities.     Coordination    Finger-to-nose, rapid alternating movements and heel-to-shin normal bilaterally without dysmetria.    Gait    Did not assess.  Defer to PT.    Drains:   Closed/Suction Drain 1 Posterior Back Bulb 7 Fr. (Active)   Site Description Unable to view 07/30/25 0400   Dressing Status Clean;Intact;Dry 07/30/25 0400   Drainage Appearance Bloody 07/30/25 0400   Status To bulb suction 07/30/25 0400   Output (mL) 60 mL 07/30/25 0400       [REMOVED] Urethral Catheter Silicone 16 Fr. (Removed)   Daily Indications Required activity restriction from trauma, surgery, (e.g. unstable spine, fracture, hemodynamics) 07/30/25 0400   Site Assessment Skin intact;Clean 07/30/25 0400   Collection Container Standard drainage bag 07/30/25 0400   Securement Method Securing device 07/30/25 0400   Catheter care complete Yes 07/29/25 2009   Output (mL) 225 mL 07/30/25 0609       Output by Drain (mL) 08/01/25 0701 - 08/01/25 1900 08/01/25 1901 - 08/02/25 0700 08/02/25 0701 - 08/02/25 0739 Range Total   Patient has no LDAs of requested type attached.     Imaging Results (Last 24 Hours)       ** No results found for the last 24 hours. **          Lab Results (last 24 hours)       Procedure Component Value Units Date/Time    Comprehensive Metabolic Panel [[id number]]  (Abnormal) Collected: 08/02/25 0419    Specimen: Blood Updated: 08/02/25 0553     Glucose 90 mg/dL      BUN 16.8 mg/dL      Creatinine 0.84 mg/dL      Sodium 141 mmol/L      Potassium 3.5 mmol/L      Chloride 109 mmol/L      CO2 23.0 mmol/L      Calcium 8.2 mg/dL      Total Protein 5.6 g/dL      Albumin 2.9 g/dL      ALT (SGPT) 24 U/L      AST (SGOT) 48 U/L      Alkaline Phosphatase 50 U/L      Total Bilirubin 0.3 mg/dL      Globulin 2.7 gm/dL      A/G Ratio 1.1 g/dL      BUN/Creatinine Ratio 20.0      Anion Gap 9.0 mmol/L      eGFR 95.0 mL/min/1.73     Narrative:      GFR Categories in Chronic Kidney Disease (CKD)              GFR Category          GFR (mL/min/1.73)    Interpretation  G1                    90 or greater        Normal or high (1)  G2                    60-89                Mild decrease (1)  G3a                   45-59                Mild to moderate decrease  G3b                   30-44                Moderate to severe decrease  G4                    15-29                Severe decrease  G5                    14 or less           Kidney failure    (1)In the absence of evidence of kidney disease, neither GFR category G1 or G2 fulfill the criteria for CKD.    eGFR calculation 2021 CKD-EPI creatinine equation, which does not include race as a factor    CBC Auto Differential [[id number]]  (Abnormal) Collected: 08/02/25 0419    Specimen: Blood Updated: 08/02/25 0529     WBC 11.50 10*3/mm3      RBC 2.72 10*6/mm3      Hemoglobin 8.0 g/dL      Hematocrit 24.2 %      MCV 89.0 fL      MCH 29.4 pg      MCHC 33.1 g/dL      RDW 13.8 %      RDW-SD 43.0 fl      MPV 10.8 fL      Platelets 221 10*3/mm3      Neutrophil % 72.3 %      Lymphocyte % 16.6 %      Monocyte % 8.3 %      Eosinophil % 1.8 %      Basophil % 0.5 %      Immature Grans % 0.5 %      Neutrophils, Absolute 8.31 10*3/mm3      Lymphocytes, Absolute 1.91 10*3/mm3      Monocytes, Absolute 0.95 10*3/mm3      Eosinophils, Absolute 0.21 10*3/mm3      Basophils, Absolute 0.06 10*3/mm3      Immature Grans, Absolute 0.06 10*3/mm3      nRBC 0.0 /100 WBC     POC Glucose Once [[id number]]  (Normal) Collected: 08/02/25 0015    Specimen: Blood Updated: 08/02/25 0018     Glucose 92 mg/dL      Comment: Serial Number: [id number]:  [id number]       POC Glucose Once [[id number]]  (Normal) Collected: 08/01/25 2021    Specimen: Blood Updated: 08/01/25 2023     Glucose 91 mg/dL      Comment: Serial Number: [id number]:  [id number]       POC Glucose Once  [[id number]]  (Normal) Collected: 08/01/25 1646    Specimen: Blood Updated: 08/01/25 1649     Glucose 106 mg/dL      Comment: Serial Number: [id number]:  [id number]       POC Glucose 4x Daily Before Meals & at Bedtime [[id number]]  (Normal) Collected: 08/01/25 1134    Specimen: Blood Updated: 08/01/25 1136     Glucose 99 mg/dL      Comment: Serial Number: [id number]:  [id number]             [id number]    Endy Marte MD    08-Jan-2019 10:25

## 2025-08-02 NOTE — DISCHARGE SUMMARY
Date of Discharge:  8/2/2025    Discharge Diagnosis:   1. Impaired mobility [Z74.09]    2. 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 Metastatic cancer to spine    4. Chronic dyspnea    5. Type 2 diabetes mellitus with hyperglycemia, without long-term current use of insulin    6. Metastasis to spinal column    7. 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 Impaired mobility [Z74.09]    2. 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 Metastatic cancer to spine    4. Chronic dyspnea    5. Type 2 diabetes mellitus with hyperglycemia, without long-term current use of insulin    6. Metastasis to spinal column    7. Mass of upper lobe of right lung        Hospital Course  Porfirio Gates is a 69 y.o. male with a significant comorbidity of known lung mass, hypertension, type 2 diabetes, class I obesity, pulmonary emphysema. He presents with a new problem of 7 out of 10 back pain. Physical exam findings of bilateral Babinski and hyperreflexia in his lower extremities with increased tone in his lower extremities as well as marked cervical kyphosis.  His imaging shows lytic lesions to L1 and T11 with L1 suggesting canal compromise and violation of the posterior wall of the spinal column on noncontrast CT.      MRI of the lumbar spine and CT of the lumbar spine reveal involvement of the posterior elements of T11 which have been removed during a previous open biopsy.  Additionally there is involvement of approximately three quarters of the L1 vertebral body resulting in near vertebral body collapse and extensive epidural compression of the spinal cord and nerve roots at this level.    Patient was initially evaluated in clinic and after discussing the risk benefits and possible  complications of surgery Porfirio was brought to the main operating room for T10-L3 instrumented fusion with an L1 corpectomy on 7/29/2025.  Postoperatively he did well.  His neuro exam remained stable.  He was initially observed in the ICU for several days.  He was noticed to have supraventricular tachycardia which was treated medically with metoprolol.  Additionally had some hypotension which responded to fluid boluses.  His pain was controlled on oral medication, they were evaluated by Physical Therapy and Occupational Therapy and deemed appropriate for discharge home with assistance and home health.  At discharge the patient was able to ambulate with a walker.  Postoperative education was performed at bedside by myself which included wound care instructions, maximal physical activity, use of postoperative pain medication including tapering, and indications for contacting my office or the emergency room.  Arrangements for postoperative follow-up in my clinic with a wound check in 2 weeks with my nurse practitioner and at 6 weeks by myself.      Procedures Performed  Procedure(s):  T10-L3 instrumentation and LUMBAR CORPECTOMY, right with Stealth and O-Arm       Consults:   Consults       Date and Time Order Name Status Description    7/31/2025  8:40 AM Inpatient Cardiology Consult Completed             Pertinent Test Results:                 Lab Results (last 24 hours)       Procedure Component Value Units Date/Time    POC Glucose 4x Daily Before Meals & at Bedtime [[id number]]  (Normal) Collected: 08/02/25 0740    Specimen: Blood Updated: 08/02/25 0742     Glucose 86 mg/dL      Comment: Serial Number: [id number]:  [id number]       Comprehensive Metabolic Panel [[id number]]  (Abnormal) Collected: 08/02/25 0419    Specimen: Blood Updated: 08/02/25 0553     Glucose 90 mg/dL      BUN 16.8 mg/dL      Creatinine 0.84 mg/dL      Sodium 141 mmol/L      Potassium 3.5 mmol/L      Chloride 109 mmol/L      CO2 23.0 mmol/L       Calcium 8.2 mg/dL      Total Protein 5.6 g/dL      Albumin 2.9 g/dL      ALT (SGPT) 24 U/L      AST (SGOT) 48 U/L      Alkaline Phosphatase 50 U/L      Total Bilirubin 0.3 mg/dL      Globulin 2.7 gm/dL      A/G Ratio 1.1 g/dL      BUN/Creatinine Ratio 20.0     Anion Gap 9.0 mmol/L      eGFR 95.0 mL/min/1.73     Narrative:      GFR Categories in Chronic Kidney Disease (CKD)              GFR Category          GFR (mL/min/1.73)    Interpretation  G1                    90 or greater        Normal or high (1)  G2                    60-89                Mild decrease (1)  G3a                   45-59                Mild to moderate decrease  G3b                   30-44                Moderate to severe decrease  G4                    15-29                Severe decrease  G5                    14 or less           Kidney failure    (1)In the absence of evidence of kidney disease, neither GFR category G1 or G2 fulfill the criteria for CKD.    eGFR calculation 2021 CKD-EPI creatinine equation, which does not include race as a factor    CBC Auto Differential [[id number]]  (Abnormal) Collected: 08/02/25 0419    Specimen: Blood Updated: 08/02/25 0529     WBC 11.50 10*3/mm3      RBC 2.72 10*6/mm3      Hemoglobin 8.0 g/dL      Hematocrit 24.2 %      MCV 89.0 fL      MCH 29.4 pg      MCHC 33.1 g/dL      RDW 13.8 %      RDW-SD 43.0 fl      MPV 10.8 fL      Platelets 221 10*3/mm3      Neutrophil % 72.3 %      Lymphocyte % 16.6 %      Monocyte % 8.3 %      Eosinophil % 1.8 %      Basophil % 0.5 %      Immature Grans % 0.5 %      Neutrophils, Absolute 8.31 10*3/mm3      Lymphocytes, Absolute 1.91 10*3/mm3      Monocytes, Absolute 0.95 10*3/mm3      Eosinophils, Absolute 0.21 10*3/mm3      Basophils, Absolute 0.06 10*3/mm3      Immature Grans, Absolute 0.06 10*3/mm3      nRBC 0.0 /100 WBC     POC Glucose Once [[id number]]  (Normal) Collected: 08/02/25 0015    Specimen: Blood Updated: 08/02/25 0018     Glucose 92 mg/dL      Comment:  Serial Number: [id number]:  [id number]       POC Glucose Once [[id number]]  (Normal) Collected: 08/01/25 2021    Specimen: Blood Updated: 08/01/25 2023     Glucose 91 mg/dL      Comment: Serial Number: [id number]:  [id number]       POC Glucose Once [[id number]]  (Normal) Collected: 08/01/25 1646    Specimen: Blood Updated: 08/01/25 1649     Glucose 106 mg/dL      Comment: Serial Number: [id number]:  [id number]       POC Glucose 4x Daily Before Meals & at Bedtime [[id number]]  (Normal) Collected: 08/01/25 1134    Specimen: Blood Updated: 08/01/25 1136     Glucose 99 mg/dL      Comment: Serial Number: [id number]:  [id number]               Condition on Discharge: Stable    Vital Signs  Temp:  [97.9 °F (36.6 °C)-99 °F (37.2 °C)] 97.9 °F (36.6 °C)  Heart Rate:  [] 78  Resp:  [16-18] 18  BP: ()/(46-85) 112/62    Physical Exam:   Physical Exam  Constitutional:       General: He is awake.   Eyes:      Extraocular Movements: Extraocular movements intact.      Pupils: Pupils are equal, round, and reactive to light.   Neurological:      Motor: Motor strength is normal.  Psychiatric:         Speech: Speech normal.          Neurological Exam  Mental Status  Awake. Oriented to person, place and time. Speech is normal. Language is fluent with no aphasia. Attention and concentration are normal.    Cranial Nerves  CN II: Visual acuity is normal.  CN III, IV, VI: Extraocular movements intact bilaterally. Pupils equal round and reactive to light bilaterally.  CN VII: Full and symmetric facial movement.    Motor  Normal muscle bulk throughout. Normal muscle tone. Strength is 5/5 throughout all four extremities.    Sensory  Light touch is normal in upper and lower extremities.     Gait  Casual gait is normal including stance, stride, and arm swing.      Discharge Disposition  Home or Self Care    Discharge Medications     Discharge Medications        New Medications        Instructions Start Date    ascorbic acid 1000 MG tablet  Commonly known as: VITAMIN C   1,000 mg, Oral, Daily   Start Date: August 3, 2025     cyclobenzaprine 10 MG tablet  Commonly known as: FLEXERIL   10 mg, Oral, 3 Times Daily PRN      gabapentin 100 MG capsule  Commonly known as: NEURONTIN   100 mg, Oral, 3 Times Daily      Lidocaine 4 %   1 patch, Transdermal, Every 24 Hours Scheduled, Remove & Discard patch within 12 hours or as directed by MD   Start Date: August 3, 2025     metoprolol tartrate 25 MG tablet  Commonly known as: LOPRESSOR   12.5 mg, Oral, Every 12 Hours Scheduled      mupirocin 2 % nasal ointment  Commonly known as: BACTROBAN   1 Application, Each Nare, 2 Times Daily      naloxone 4 MG/0.1ML nasal spray  Commonly known as: NARCAN   Call 911. Don't prime. Martin in 1 nostril for overdose. Repeat in 2-3 minutes in other nostril if no or minimal breathing/responsiveness.      oxyCODONE-acetaminophen  MG per tablet  Commonly known as: PERCOCET   1 tablet, Oral, Every 6 Hours PRN      polyethylene glycol 17 g packet  Commonly known as: MIRALAX   17 g, Oral, Daily   Start Date: August 3, 2025     Vitamin A 3 MG (02194 UT) capsule   20,000 Units, Oral, Daily   Start Date: August 3, 2025     zinc sulfate 220 (50 Zn) MG capsule  Commonly known as: ZINCATE   220 mg, Oral, Daily   Start Date: August 3, 2025            Continue These Medications        Instructions Start Date   aspirin 81 MG EC tablet   81 mg, Daily      atorvastatin 40 MG tablet  Commonly known as: LIPITOR   40 mg, Oral, Nightly      clotrimazole-betamethasone 1-0.05 % cream  Commonly known as: LOTRISONE   1 Application, Topical, 2 Times Daily      losartan 25 MG tablet  Commonly known as: COZAAR   25 mg, Oral, Daily      metFORMIN  MG 24 hr tablet  Commonly known as: GLUCOPHAGE-XR   500 mg, Oral, 2 Times Daily      Ozempic (0.25 or 0.5 MG/DOSE) 2 MG/3ML solution pen-injector  Generic drug: Semaglutide(0.25 or 0.5MG/DOS)   Subcutaneous, Weekly, Pt  states is no longer going to  be taking but did take 2 weeks ago       sennosides-docusate 8.6-50 MG per tablet  Commonly known as: PERICOLACE   2 tablets, Oral, Daily             Stop These Medications      ibuprofen 200 MG tablet  Commonly known as: ADVIL,MOTRIN              Discharge Diet:  as tolerated    Activity at Discharge:  ambulate with roll walker.  No bending lifting or twisting.  No lifting more than 8 lbs    Follow-up Appointments  Future Appointments   Date Time Provider Department Center   9/3/2025  9:00 AM Jenaro Graff MD [facility]   9/17/2025  9:00 AM Endy Huitron MD [facility]     Additional Instructions for the Follow-ups that You Need to Schedule       Ambulatory Referral to Home Health   As directed      Face to Face Visit Date: 8/2/2025   Follow-up provider for Plan of Care?: I will be treating the patient on an ongoing basis.  Please send me the Plan of Care for signature.   Follow-up provider: ENDY HUITRON [[id number]]   Reason/Clinical Findings: medication managment, wound care, home PT/OT   Describe mobility limitations that make leaving home difficult: large thoricopelvic fusion, new diagnosis of cancer   Nursing/Therapeutic Services Requested: Skilled Nursing Physical Therapy Occupational Therapy   Skilled nursing orders: Pain management Medication education   PT orders: Gait Training   Weight Bearing Status: As Tolerated   Occupational orders: Strengthening Activities of daily living   Frequency: 1 Week 1        Ambulatory Referral to Oncology   As directed              Test Results Pending at Discharge       Endy Huitron MD  08/02/25  08:17 CDT    Time: Discharge 20 min

## 2025-08-02 NOTE — PAYOR COMM NOTE
"DC HOME 8-2-25    Porfirio Gatestis \"Jorje\" (69 y.o. Male)       Date of Birth   1955    Social Security Number       Address   [address] [place], [place] [address]    Home Phone   [phone]    MRN   [id number]       Jehovah's witness   [facility]    Marital Status   Single                            Admission Date   7/29/2025    Admission Type   Elective    Admitting Provider   Endy Marte MD    Attending Provider       Department, Room/Bed   [facility] 3A, 352/1       Discharge Date   8/2/2025    Discharge Disposition   Home or Self Care    Discharge Destination                                 Attending Provider: (none)   Allergies: No Known Allergies    Isolation: None   Infection: None   Code Status: CPR    Ht: 175 cm (68.9\")   Wt: 99 kg (218 lb 4.1 oz)    Admission Cmt: None   Principal Problem: 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 [C79.51,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.1]                   Active Insurance as of 7/29/2025       Primary Coverage       Payor Plan Insurance Group Employer/Plan Group    HUMANA MEDICARE REPLACEMENT HUMANA MEDICARE ADVANTAGE PPO [id number]       Payor Plan Address Payor Plan Phone Number Payor Plan Fax Number Effective Dates    [address] [address] [phone]  7/1/2024 - None Entered    [facility] [id number]         Subscriber Name Subscriber Birth Date Member ID       PORFIRIO GATES 1955 [id number]                     Emergency Contacts        (Rel.) Home Phone Work Phone Mobile Phone    Tiffanie Joiner (Sister) -- -- [phone]    héctor little (Daughter) [phone] -- --                 Operative/Procedure Notes (all)        Endy Marte MD at 07/29/25 1236  Version 3 of 3         NEUROSURGERY OPERATIVE NOTE    Porfirio Jorje Gates  OR Date: 7/29/2025    Pre-op Diagnosis:   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 [C79.51, C80.1]    Post-op Diagnosis:     Post-Op Diagnosis Codes:     * " 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 [C79.51, C80.1]    Surgeon(s):  Endy Marte MD    Anesthesia: General    Staff:   Circulator: Shad Collier RN; Law Covington RN; Claudia Jay RN  Scrub Person: Chris Villalobos; Divine Carrero; Shelly Edmonds; Myles Bower  Vendor Representative: Roberto Mercado; Hiram Osei  Orientee: Kristie Judd RN    Procedure(s):  T10-L3 instrumentation and LUMBAR 1 CORPECTOMY, right with Stealth and O-Arm    Arthrodesis -  - Arthrodesis, posterior or posterolateral technique, single level;T10/11  - Arthrodesis, posterior or posterolateral technique, each additional vertebral segment, T11/12, T12/L1, L1/2 and L2/3  - Arthrodesis, anterior interbody T12 thru L2 associated with corpectomy    Corpectomy -  L1 corpectomy, lateral extracavitary approach    Instrumentation -  - Posterior segmental instrumentation (eg, pedicle fixation, dual rods with multiple hooks and sublaminar wires); 3 to 6 vertebral segments, T10, T11, T12, L2, and L3  - L1 expandable intervertebral body cage    Decompression -  - Lumbar laminectomy with medial facetectomy and foraminotomy, open, L1    Graft -  - Autograft for spine surgery only (includes harvesting the graft); local (eg, ribs, spinous process, or laminar fragments) obtained from same incision  - Allograft, moreselized    Other -  - Stereotactic computer assisted procedure; spinal.   - Continuous neurophysiology monitoring, from outside the operating room (remote or nearby) or for monitoring of more than one case while in the operating room, per hour    Spinal Surgery Levels Completed:5 Levels    INDICATIONS FOR PROCEDURE:   Porfirio Gates is a 69 y.o. male with a significant comorbidity of known lung mass, hypertension, type 2 diabetes, class I obesity, pulmonary emphysema. He presents with a new problem of 7 out of 10 back pain. Physical exam findings of bilateral Babinski and hyperreflexia in his  lower extremities with increased tone in his lower extremities as well as marked cervical kyphosis.  His imaging shows lytic lesions to L1 and T11 with L1 suggesting canal compromise and violation of the posterior wall of the spinal column on noncontrast CT.     MRI of the lumbar spine and CT of the lumbar spine reveal involvement of the posterior elements of T11 which have been removed during a previous open biopsy.  Additionally there is involvement of approximately three quarters of the L1 vertebral body resulting in near vertebral body collapse and extensive epidural compression of the spinal cord and nerve roots at this level.    The risks and benefits of the procedure were discussed. The patient accepted and understood all potential risks and complications including infection, CSF leak, failure of hardware, hematoma, damage to nerve roots or spinal cord, bowel or bladder incontinence, difficulty walking or weakness.  After considering options, the patient requested that we proceed with the procedure.    DESCRIPTION OF OPERATION AND FINDINGS:   On the day of surgery, the patient was brought to the preoperative holding area where IV access was obtained. Prophylactic intravenous antibiotics were administered. The patient was then brought to the major operative suite.     While on the \[facility]\"", the patient underwent an uneventful induction of general anesthetic with placement of endotracheal tube. TEDs and SCD hoses were applied.  SSEP and EMG monitoring leads were placed. The patient was then placed in prone position on a Sin table.  All pressure points were inspected and appropriately padded, and the patient was secured to the table.  Post position monitoring was then confirmed to be stable.  The back was sterilely prepped with alcohol.  With the patient's relevant imaging dominantly displayed, lateral fluoroscopy was brought into the field and used to approximate T10 - L3. Chloraprep and DuraPrep was  "then applied and allowed to dry for 5 minutes, and the patient was draped in the usual fashion.  At this point a WHO surgical timeout was performed with all members of the surgical team.      Exposure  The skin was infiltrated with quarter percent Marcaine with epinephrine.  Skin was incised with a #10 scalpel.  Bovie was then used to clear away soft tissue and cut through the lumbodorsal fascia.      Self-retaining retractors were placed and subsequently readjusted as needed. Standard subperiosteal dissection was then carried out to expose the posterior and posterolateral elements from T10 to L3 with lateral exposure carried out to the lateral aspect of the transverse processes     Pedicle Screw Instrumentation  A Stealth Frame was then attached to the T9 spinous process and O-Arm was brought into the field.  CT image was obtained and used to determine optimal pedicle screw size and length.  The O-Arm was then broken and the patient unswaddled.  Accuracy of the frame-less stereotaxis was made by localization of a Stealth frame and spinous processes.     Pedicle screw fixation was carried out in the following manner. Screws were placed in systematic caudal and cranial fashion. The pedicle screw entry sites were chosen using standard dorsal landmarks and stealth guidance. Cortical openings and  holes were created at these sites using a 2mm navigated drill. The pedicular tracts were then tapped with a navigated pedicle tap. Each site was \"under tapped\" and reprobed with satisfactory findings noted as above. Screws in the following dimensions were placed.      Left  Right   T10 6.5 x 50 6.5 x 50  T11 7.5 x 45 7.5 x 50  T12 6.5 x 55 7.5 x 55  L1 skipped due to metastatic involvement  L2 5.5 x 55 5.5 x 55  L3 7.5 x 60 5.5 x 55    Next we used a brittany template to estimate the length and then cut a temporary brittany to size.  This was then placed on the left for stabilization prior to decompression and corpectomy.  " Neuromonitoring was run and all signals were found to be stable.    Laminectomy, Facetectomy and Foraminotomy  Using a matchstick sterling, the lamina at L1 was thinned to eggshell thickness and then removed with Kerrison rongeurs we then took care to remove bone out of the lateral gutters..      Corpectomy, lateral extracavitary approach  Once the laminectomy was performed attention was turned to the right facet pars and transverse processes.  Using a Bovie cautery we fully expose the transverse process.  We began by transecting and fully removing the transverse process.  Next we performed a resection of the inferior articulating facet of T12.  Next using Bovie cautery and gentle retraction we moved along the outside of the transverse process pars and facet fully freeing this from the soft tissue.  Due to tumor invasion the bone was found to be quite soft.  Therefore we were able to fracture the bone through the pedicle out laterally and remove this en bloc.  Pedicles found to be fully invested with tumor.  Using suction and pituitary we were able to resect the remaining portion of the pedicle.  Easily identifying the exiting nerve root.  This was protected and removed from investing tumor.  This was found to be intact.  Working both superior and inferior to the tumor we then entered the vertebral body and performed a rough corpectomy using pituitary and brittany chairs.  Next removed along the outside of the vertebral body.  Using blunt dissection to remove soft tissue from the vertebral body itself until we could clearly identify the anterior portion of the vertebral body.  A brain ribbon was placed along the outside of the vertebral body and anteriorly to protect the great vessels and soft tissue.  Next attention was turned to the superior disc spaces.  At both the superior disc space T12/L1 and the inferior disc space L1/2, we used Stealth to identify the disc spaces.  Soft tissue was retracted and this was entered with  a 15 blade.  We then used a pituitary to perform a rough discectomy at both these levels thus identifying the superior and inferior borders of the corpectomy.  Care was taken not to violate the endplates.  Next a 4 mm round cutting bur was brought into the field and a corpectomy was performed internally.  Next Kerrisons and pituitary and a large bite Leksell were used to remove any remaining tumor and remove the superior and inferior endplates.  Finally the dura was  from the posterior longitudinal ligament.  The posterior longitudinal ligament was transected using Metzenbaum.  And a down pushing curette was used to fracture the remaining shell of the vertebral body anteriorly.  A rim of bone was left for protection of the great vessels and small tissue surrounding.  We resected greater than 75% of the vertebral body.  Next a trial was brought into the field.  The exiting nerve root was retracted and the trial was found to fit easily in the superiorly and inferiorly.  Using this a expandable Medtronic stratosphere cage 20 mm was assembled on the back table and brought into the field.  This was then placed gently into the corpectomy defect.  This was expanded under fluoroscopy.  Neuromonitoring was run and found to be stable.  This was found to fit snugly into the defect.  Cage was then locked.  Hemostasis was achieved.  The corpectomy was fully washed.  And morselized autograft was packed around the cage.    Next A brittany template was used to estimate the brittany length.  Then a 170 mm x 6.0 mm cobalt chrome brittany was bent appropriately and secured to the tulip heads from T10 to L3. Compression across the T12/L1 and L1/2 interspace(s) was perfomed and cap screws were final tightened.  A cross-link was then applied approximately midway down the construct.    Arthrodesis  The posterolateral elements on the left from T10 to L3 and on the right from T10-T12 and L2-L3 were arthrodesed using a high speed drill.  The wound  was thoroughly irrigated with 1 L of bacitracin irrigant and dried with satisfactory hemostasis noted.  The posterolateral gutters were then packed with autograft, allograft, and demineralized bone matrix. Thorough hemostasis was ascertained after checking the construct closely and fluoroscopically.     A 7 Slovenian flat drain was placed and tunneled out above the incision.  The deep paraspinal musculature was closed with 0-Vicryl sutures.  The muscle fascia was closed with 0-Vicryl sutures as well.  Subcutaneous tissues were closed with inverted 2-0 Vicryl sutures and the skin was closed with 4-0 Monocryl.  The incision was covered with xeroform and Ioban.  All counts were noted to be correct at the end of the case.  They were placed back on the hospital gurney and extubated. The patient was taken to the recovery room in stable condition.    OPERATIVE FINDINGS:   Cancer involvement at T11 and L1 as anticipated from preoperative imaging studies. Pedicle screw placement with computer assisted navigation appeared satisfactory with satisfactory purchase and positioning noted at all sites as well as satisfactory findings upon probing of the pedicular tracts at each site. In addition, spacer snugness and positioning appeared satisfactory on imaging. Electrophysiological monitoring was carried out throughout the procedure and remained stable with no undue changes reported.     Estimated Blood Loss: 1800 mL    Complications: Neuro monitoring remained stable.     Implants:   Implant Name Type Inv. Item Serial No.  Lot No. LRB No. Used Action   HEMOST ABS SURGIFOAM  8X12 10MM - [id number] Implant HEMOST ABS SURGIFOAM  8X12 10MM  [employer]  DIV OF J AND J [id number] Right 1 Implanted   KT HEMOST ABS SURGIFOAM PORCN 1GRAM - [id number] Implant KT HEMOST ABS SURGIFOAM PORCN 1GRAM  [employer]  DIV OF J AND J [id number] Right 1 Implanted   KT HEMOST ABS SURGIFOAM PORCN 1GRAM - [id number] Implant KT HEMOST ABS SURGIFOAM  PORCN 1GRAM  [facility]  DIV OF J AND J [id number] Right 1 Implanted   SCRW SOLERA MAS 5.5/6MM 6.5X50MM - [id number] Implant SCRW SOLERA MAS 5.5/6MM 6.5X50MM  [employer] NR Right 2 Implanted   SCRW SOLERA MAS COCR 7.5X45MM - [id number] Implant SCRW SOLERA MAS COCR 7.5X45MM  [employer] NR Right 1 Implanted   SCRW SOLERA MAS 7.5X50MM - [id number] Implant SCRW SOLERA MAS 7.5X50MM  [employer] NR Right 1 Implanted   SCRW SOLERA MAS 5.5/6MM 6.5X55MM - [id number] Implant SCRW SOLERA MAS 5.5/6MM 6.5X55MM  [employer] NR Right 1 Implanted   SCRW SOLERA MAS COCR 7.5X55MM - [id number] Implant SCRW SOLERA MAS COCR 7.5X55MM  [employer] NR Right 1 Implanted   SCRW SOLERA MAS 5.5/6MM 5.5X55MM - [id number] Implant SCRW SOLERA MAS 5.5/6MM 5.5X55MM  [employer] NR Right 3 Implanted   SCRW SOLERA MAS COCR 7.5X60MM - [id number] Implant SCRW SOLERA MAS COCR 7.5X60MM  [employer] NR Right 1 Implanted   RACIEL SOLERA CHROMALLOY PLS STR 8W424SZ - [id number] Implant RACIEL SOLERA CHROMALLOY PLS STR [id number]  [employer] NA Right 1 Implanted   6.0CROSSLINK    [employer] NA Right 1 Implanted   SCRW ST SOLERA BRKOFF TI 5.5MM - [id number] Implant SCRW ST SOLERA BRKOFF TI 5.5MM  [employer] NA Right 10 Implanted   CTR/PC CERV [id number] SZEC 20MM - [id number] Implant CTR/PC CERV [id number] SZEC 20MM  [employer] NA Right 1 Implanted   ALLOGRFT DBM MAGNIFUSE 1X20CM - [id number] - [id number] Implant ALLOGRFT DBM MAGNIFUSE 1X20CM [id number] [employer] A [employer] CO NA Right 1 Implanted   PUTTY DBM VIVIANA 5CC - [id number] Implant PUTTY DBM VIVIANA 5CC  [employer] [id number] Right 1 Implanted       Specimens:                Specimens       ID Source Type Tests Collected By Collected At Frozen?    A Back, Lower Tissue TISSUE PATHOLOGY EXAM   Endy Marte MD 7/29/25 3335 No    Description: THORACIC/LUMBAR SPINE TUMOR              Drains:   Closed/Suction Drain 1 Posterior Back Bulb 7 Fr. (Active)   Site Description Unable to view  07/30/25 2000   Dressing Status Clean;Intact;Dry 07/30/25 2000   Drainage Appearance Bloody 07/30/25 2000   Status To bulb suction 07/30/25 2000   Output (mL) 30 mL 07/31/25 0800       [REMOVED] Urethral Catheter Silicone 16 Fr. (Removed)   Daily Indications Required activity restriction from trauma, surgery, (e.g. 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 [C79.51, C80.1]    Post-op Diagnosis:     Post-Op Diagnosis Codes:     * 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 [C79.51, C80.1]    Surgeon(s):  Endy Marte MD    Anesthesia: General    Staff:   Circulator: Shad Collier RN; Law Covington RN; Claudia Jay RN  Scrub Person: Chris Villalobos; Divine Carrero; Shelly Edmonds; Myles Bower  Vendor Representative: Roberto Mercado; Hiram Osei  Orientee: Kristie Judd RN    Procedure(s):  T10-L3 instrumentation and LUMBAR 1 CORPECTOMY, right with Stealth and O-Arm    Arthrodesis -  - Arthrodesis, posterior or posterolateral technique, single level;T10/11  - Arthrodesis, posterior or posterolateral technique, each additional vertebral segment, T11/12, T12/L1, L1/2 and L2/3  - Arthrodesis, anterior interbody T12 thru L2 associated with corpectomy    Corpectomy -  L1 corpectomy, lateral extracavitary approach    Instrumentation -  - Posterior segmental instrumentation (eg, pedicle fixation, dual rods with multiple  hooks and sublaminar wires); 3 to 6 vertebral segments, T10, T11, T12, L2, and L3  - L1 expandable intervertebral body cage    Decompression -  - Lumbar laminectomy with medial facetectomy and foraminotomy, open, L1    Graft -  - Autograft for spine surgery only (includes harvesting the graft); local (eg, ribs, spinous process, or laminar fragments) obtained from same incision  - Allograft, moreselized    Other -  - Stereotactic computer assisted procedure; spinal.   - Continuous neurophysiology monitoring, from outside the operating room (remote or nearby) or for monitoring of more than one case while in the operating room, per hour    Spinal Surgery Levels Completed:5 Levels    INDICATIONS FOR PROCEDURE:   Porfirio Gates is a 69 y.o. male with a significant comorbidity of known lung mass, hypertension, type 2 diabetes, class I obesity, pulmonary emphysema. He presents with a new problem of 7 out of 10 back pain. Physical exam findings of bilateral Babinski and hyperreflexia in his lower extremities with increased tone in his lower extremities as well as marked cervical kyphosis.  His imaging shows lytic lesions to L1 and T11 with L1 suggesting canal compromise and violation of the posterior wall of the spinal column on noncontrast CT.     MRI of the lumbar spine and CT of the lumbar spine reveal involvement of the posterior elements of T11 which have been removed during a previous open biopsy.  Additionally there is involvement of approximately three quarters of the L1 vertebral body resulting in near vertebral body collapse and extensive epidural compression of the spinal cord and nerve roots at this level.    The risks and benefits of the procedure were discussed. The patient accepted and understood all potential risks and complications including infection, CSF leak, failure of hardware, hematoma, damage to nerve roots or spinal cord, bowel or bladder incontinence, difficulty walking or weakness.  After  considering options, the patient requested that we proceed with the procedure.    DESCRIPTION OF OPERATION AND FINDINGS:   On the day of surgery, the patient was brought to the preoperative holding area where IV access was obtained. Prophylactic intravenous antibiotics were administered. The patient was then brought to the major operative suite.     While on the [facility], the patient underwent an uneventful induction of general anesthetic with placement of endotracheal tube. TEDs and SCD hoses were applied.  SSEP and EMG monitoring leads were placed. The patient was then placed in prone position on a Sin table.  All pressure points were inspected and appropriately padded, and the patient was secured to the table.  Post position monitoring was then confirmed to be stable.  The back was sterilely prepped with alcohol.  With the patient's relevant imaging dominantly displayed, lateral fluoroscopy was brought into the field and used to approximate T10 - L3. Chloraprep and DuraPrep was then applied and allowed to dry for 5 minutes, and the patient was draped in the usual fashion.  At this point a WHO surgical timeout was performed with all members of the surgical team.      Exposure  The skin was infiltrated with quarter percent Marcaine with epinephrine.  Skin was incised with a #10 scalpel.  Bovie was then used to clear away soft tissue and cut through the lumbodorsal fascia.      Self-retaining retractors were placed and subsequently readjusted as needed. Standard subperiosteal dissection was then carried out to expose the posterior and posterolateral elements from T10 to L3 with lateral exposure carried out to the lateral aspect of the transverse processes     Pedicle Screw Instrumentation  A Stealth Frame was then attached to the T9 spinous process and O-Arm was brought into the field.  CT image was obtained and used to determine optimal pedicle screw size and length.  The O-Arm was then broken and the  "patient unswaddled.  Accuracy of the frame-less stereotaxis was made by localization of a Stealth frame and spinous processes.     Pedicle screw fixation was carried out in the following manner. Screws were placed in systematic caudal and cranial fashion. The pedicle screw entry sites were chosen using standard dorsal landmarks and stealth guidance. Cortical openings and  holes were created at these sites using a 2mm navigated drill. The pedicular tracts were then tapped with a navigated pedicle tap. Each site was \"under tapped\" and reprobed with satisfactory findings noted as above. Screws in the following dimensions were placed.      Left  Right   T10 6.5 x 50 6.5 x 50  T11 7.5 x 45 7.5 x 50  T12 6.5 x 55 7.5 x 55  L1 skipped due to metastatic involvement  L2 5.5 x 55 5.5 x 55  L3 7.5 x 60 5.5 x 55    Next we used a brittany template to estimate the length and then cut a temporary brittany to size.  This was then placed on the left for stabilization prior to decompression and corpectomy.  Neuromonitoring was run and all signals were found to be stable.    Laminectomy, Facetectomy and Foraminotomy  Using a matchstick sterling, the lamina at L1 was thinned to eggshell thickness and then removed with Kerrison rongeurs we then took care to remove bone out of the lateral gutters..      Corpectomy, lateral extracavitary approach  Once the laminectomy was performed attention was turned to the right facet pars and transverse processes.  Using a Bovie cautery we fully expose the transverse process.  We began by transecting and fully removing the transverse process.  Next we performed a resection of the inferior articulating facet of T12.  Next using Bovie cautery and gentle retraction we moved along the outside of the transverse process pars and facet fully freeing this from the soft tissue.  Due to tumor invasion the bone was found to be quite soft.  Therefore we were able to fracture the bone through the pedicle out laterally and " remove this en bloc.  Pedicles found to be fully invested with tumor.  Using suction and pituitary we were able to resect the remaining portion of the pedicle.  Easily identifying the exiting nerve root.  This was protected and removed from investing tumor.  This was found to be intact.  Working both superior and inferior to the tumor we then entered the vertebral body and performed a rough corpectomy using pituitary and brittany chairs.  Next removed along the outside of the vertebral body.  Using blunt dissection to remove soft tissue from the vertebral body itself until we could clearly identify the anterior portion of the vertebral body.  A brain ribbon was placed along the outside of the vertebral body and anteriorly to protect the great vessels and soft tissue.  Next attention was turned to the superior disc spaces.  At both the superior disc space T12/L1 and the inferior disc space L1/2, we used Stealth to identify the disc spaces.  Soft tissue was retracted and this was entered with a 15 blade.  We then used a pituitary to perform a rough discectomy at both these levels thus identifying the superior and inferior borders of the corpectomy.  Care was taken not to violate the endplates.  Next a 4 mm round cutting bur was brought into the field and a corpectomy was performed internally.  Next Kerrisons and pituitary and a large bite Leksell were used to remove any remaining tumor and remove the superior and inferior endplates.  Finally the dura was  from the posterior longitudinal ligament.  The posterior longitudinal ligament was transected using Metzenbaum.  And a down pushing curette was used to fracture the remaining shell of the vertebral body anteriorly.  A rim of bone was left for protection of the great vessels and small tissue surrounding.  We resected greater than 75% of the vertebral body.  Next a trial was brought into the field.  The exiting nerve root was retracted and the trial was found to  fit easily in the superiorly and inferiorly.  Using this a expandable Medtronic stratosphere cage 20 mm was assembled on the back table and brought into the field.  This was then placed gently into the corpectomy defect.  This was expanded under fluoroscopy.  Neuromonitoring was run and found to be stable.  This was found to fit snugly into the defect.  Cage was then locked.  Hemostasis was achieved.  The corpectomy was fully washed.  And morselized autograft was packed around the cage.    Next A brittany template was used to estimate the brittany length.  Then a 170 mm x 6.0 mm cobalt chrome brittany was bent appropriately and secured to the tulip heads from T10 to L3. Compression across the T12/L1 and L1/2 interspace(s) was perfomed and cap screws were final tightened.  A cross-link was then applied approximately midway down the construct.    Arthrodesis  The posterolateral elements on the left from T10 to L3 and on the right from T10-T12 and L2-L3 were arthrodesed using a high speed drill.  The wound was thoroughly irrigated with 1 L of bacitracin irrigant and dried with satisfactory hemostasis noted.  The posterolateral gutters were then packed with autograft, allograft, and demineralized bone matrix. Thorough hemostasis was ascertained after checking the construct closely and fluoroscopically.     A 7 Portuguese flat drain was placed and tunneled out above the incision.  The deep paraspinal musculature was closed with 0-Vicryl sutures.  The muscle fascia was closed with 0-Vicryl sutures as well.  Subcutaneous tissues were closed with inverted 2-0 Vicryl sutures and the skin was closed with 4-0 Monocryl.  The incision was covered with xeroform and Ioban.  All counts were noted to be correct at the end of the case.  They were placed back on the [facility] and extubated. The patient was taken to the recovery room in stable condition.    OPERATIVE FINDINGS:   Cancer involvement at T11 and L1 as anticipated from preoperative  imaging studies. Pedicle screw placement with computer assisted navigation appeared satisfactory with satisfactory purchase and positioning noted at all sites as well as satisfactory findings upon probing of the pedicular tracts at each site. In addition, spacer snugness and positioning appeared satisfactory on imaging. Electrophysiological monitoring was carried out throughout the procedure and remained stable with no undue changes reported.     Estimated Blood Loss: 1800 mL    Complications: Neuro monitoring remained stable.     Implants:   Implant Name Type Inv. Item Serial No.  Lot No. LRB No. Used Action   HEMOST ABS SURGIFOAM  8X12 10MM - [id number] Implant HEMOST ABS SURGIFOAM  8X12 10MM  [employer]  DIV OF RadMit AND J [id number] Right 1 Implanted   KT HEMOST ABS SURGIFOAM PORCN 1GRAM - [id number] Implant KT HEMOST ABS SURGIFOAM PORCN 1GRAM  [employer]  DIV OF  AND J [id number] Right 1 Implanted   KT HEMOST ABS SURGIFOAM PORCN 1GRAM - [id number] Implant KT HEMOST ABS SURGIFOAM PORCN 1GRAM  [employer]  DIV OF  AND J [id number] Right 1 Implanted   SCRW SOLERA MAS 5.5/6MM 6.5X50MM - [id number] Implant SCRW SOLERA MAS 5.5/6MM 6.5X50MM  [employer] NR Right 2 Implanted   SCRW SOLERA MAS COCR 7.5X45MM - [id number] Implant SCRW SOLERA MAS COCR 7.5X45MM  [employer] NR Right 1 Implanted   SCRW SOLERA MAS 7.5X50MM - [id number] Implant SCRW SOLERA MAS 7.5X50MM  [employer] NR Right 1 Implanted   SCRW SOLERA MAS 5.5/6MM 6.5X55MM - [id number] Implant SCRW SOLERA MAS 5.5/6MM 6.5X55MM  [employer] NR Right 1 Implanted   SCRW SOLERA MAS COCR 7.5X55MM - [id number] Implant SCRW SOLERA MAS COCR 7.5X55MM  [employer] NR Right 1 Implanted   SCRW SOLERA MAS 5.5/6MM 5.5X55MM - [id number] Implant SCRW SOLERA MAS 5.5/6MM 5.5X55MM  [employer] NR Right 3 Implanted   SCRW SOLERA MAS COCR 7.5X60MM - [id number] Implant SCRW SOLERA MAS COCR 7.5X60MM  [employer] NR Right 1 Implanted   RACIEL SOLERA CHROMALLOY PLS STR 2P529JG - [id number]  Implant RACIEL SOLERA CHROMALLOY PLS STR [id number]  [employer] NA Right 1 Implanted   6.0CROSSLINK    [employer]IC NA Right 1 Implanted   SCRW ST SOLERA BRKOFF TI 5.5MM - [id number] Implant SCRW ST SOLERA BRKOFF TI 5.5MM  [employer] NA Right 10 Implanted   CTR/PC CERV [id number] SZEC 20MM - [id number] Implant CTR/PC CERV [id number] SZEC 20MM  [employer] NA Right 1 Implanted   ALLOGRFT DBM MAGNIFUSE 1X20CM - [id number] - [id number] Implant ALLOGRFT DBM MAGNIFUSE 1X20CM [id number] [employer]IN[employer] A [employer] CO NA Right 1 Implanted   PUTTY DBM VIVIANA 5CC - [id number] Implant PUTTY DBM VIVIANA 5CC  [employer] [id number] Right 1 Implanted       Specimens:                Specimens       ID Source Type Tests Collected By Collected At Frozen?    A Back, Lower Tissue TISSUE PATHOLOGY EXAM   Endy Marte MD 7/29/25 1742 No    Description: THORACIC/LUMBAR SPINE TUMOR              Drains:   Closed/Suction Drain 1 Posterior Back Bulb 7 Fr. (Active)   Site Description Unable to view 07/30/25 2000   Dressing Status Clean;Intact;Dry 07/30/25 2000   Drainage Appearance Bloody 07/30/25 2000   Status To bulb suction 07/30/25 2000   Output (mL) 30 mL 07/31/25 0800       [REMOVED] Urethral Catheter Silicone 16 Fr. (Removed)   Daily Indications Required activity restriction from trauma, surgery, (e.g. 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 [C79.51,  C80.1]    Post-op Diagnosis:     Post-Op Diagnosis Codes:     * 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 [C79.51, C80.1]    Surgeon(s):  Endy Marte MD    Anesthesia: General    Staff:   Circulator: Shad Collier, MAICOL; Law Covington RN; Claudia Jay RN  Scrub Person: Chris Villalobos; Divine Carrero; Shelly Edmonds; Myles Bower  Vendor Representative: Roberto Mercaod; Hiram Osei  Orientee: Kristie Judd RN    Procedure(s):  T10-L3 instrumentation and LUMBAR 1 CORPECTOMY, right with Stealth and O-Arm    Arthrodesis -  - Arthrodesis, posterior or posterolateral technique, single level;T10/11  - Arthrodesis, posterior or posterolateral technique, each additional vertebral segment, T11/12, T12/L1, L1/2 and L2/3  - Arthrodesis, anterior interbody T12 thru L2 associated with corpectomy    Corpectomy -  L1 corpectomy    Instrumentation -  - Posterior segmental instrumentation (eg, pedicle fixation, dual rods with multiple hooks and sublaminar wires); 3 to 6 vertebral segments, T10, T11, T12, L2, and L3  - L1 expandable intervertebral body cage    Decompression -  - Lumbar laminectomy with medial facetectomy and foraminotomy, open, L1    Graft -  - Autograft for spine surgery only (includes harvesting the graft); local (eg, ribs, spinous process, or laminar fragments) obtained from same incision  - Allograft, moreselized    Other -  - Stereotactic computer assisted procedure; spinal.   - Continuous neurophysiology monitoring, from outside the operating room (remote or nearby) or for monitoring of more than one case while in the operating room, per hour    Spinal Surgery Levels Completed:5 Levels    INDICATIONS FOR PROCEDURE:   Porfirio Gates is a 69 y.o. male with a significant comorbidity of known lung mass, hypertension, type 2 diabetes, class I obesity, pulmonary emphysema. He presents with a new problem of 7 out of 10 back pain. Physical exam findings of bilateral  Babinski and hyperreflexia in his lower extremities with increased tone in his lower extremities as well as marked cervical kyphosis.  His imaging shows lytic lesions to L1 and T11 with L1 suggesting canal compromise and violation of the posterior wall of the spinal column on noncontrast CT.     MRI of the lumbar spine and CT of the lumbar spine reveal involvement of the posterior elements of T11 which have been removed during a previous open biopsy.  Additionally there is involvement of approximately three quarters of the L1 vertebral body resulting in near vertebral body collapse and extensive epidural compression of the spinal cord and nerve roots at this level.    The risks and benefits of the procedure were discussed. The patient accepted and understood all potential risks and complications including infection, CSF leak, failure of hardware, hematoma, damage to nerve roots or spinal cord, bowel or bladder incontinence, difficulty walking or weakness.  After considering options, the patient requested that we proceed with the procedure.    DESCRIPTION OF OPERATION AND FINDINGS:   On the day of surgery, the patient was brought to the preoperative holding area where IV access was obtained. Prophylactic intravenous antibiotics were administered. The patient was then brought to the major operative suite.     While on the [facility], the patient underwent an uneventful induction of general anesthetic with placement of endotracheal tube. TEDs and SCD hoses were applied.  SSEP and EMG monitoring leads were placed. The patient was then placed in prone position on a Sin table.  All pressure points were inspected and appropriately padded, and the patient was secured to the table.  Post position monitoring was then confirmed to be stable.  The back was sterilely prepped with alcohol.  With the patient's relevant imaging dominantly displayed, lateral fluoroscopy was brought into the field and used to approximate T10  "- L3. Chloraprep and DuraPrep was then applied and allowed to dry for 5 minutes, and the patient was draped in the usual fashion.  At this point a WHO surgical timeout was performed with all members of the surgical team.      Exposure  The skin was infiltrated with quarter percent Marcaine with epinephrine.  Skin was incised with a #10 scalpel.  Bovie was then used to clear away soft tissue and cut through the lumbodorsal fascia.      Self-retaining retractors were placed and subsequently readjusted as needed. Standard subperiosteal dissection was then carried out to expose the posterior and posterolateral elements from T10 to L3 with lateral exposure carried out to the lateral aspect of the transverse processes     Pedicle Screw Instrumentation  A Stealth Frame was then attached to the T9 spinous process and O-Arm was brought into the field.  CT image was obtained and used to determine optimal pedicle screw size and length.  The O-Arm was then broken and the patient unswaddled.  Accuracy of the frame-less stereotaxis was made by localization of a Stealth frame and spinous processes.     Pedicle screw fixation was carried out in the following manner. Screws were placed in systematic caudal and cranial fashion. The pedicle screw entry sites were chosen using standard dorsal landmarks and stealth guidance. Cortical openings and  holes were created at these sites using a 2mm navigated drill. The pedicular tracts were then tapped with a navigated pedicle tap. Each site was \"under tapped\" and reprobed with satisfactory findings noted as above. Screws in the following dimensions were placed.      Left  Right   T10 6.5 x 50 6.5 x 50  T11 7.5 x 45 7.5 x 50  T12 6.5 x 55 7.5 x 55  L1 skipped due to metastatic involvement  L2 5.5 x 55 5.5 x 55  L3 7.5 x 60 5.5 x 55    Next we used a brittany template to estimate the length and then cut a temporary brittany to size.  This was then placed on the left for stabilization prior to " decompression and corpectomy.  Neuromonitoring was run and all signals were found to be stable.    Laminectomy, Facetectomy and Foraminotomy  Using a matchstick sterling, the lamina at L1 was thinned to eggshell thickness and then removed with Kerrison rongeurs we then took care to remove bone out of the lateral gutters..      Corpectomy  Once the laminectomy was performed attention was turned to the right facet pars and transverse processes.  Using a Bovie cautery we fully expose the transverse process.  We began by transecting and fully removing the transverse process.  Next we performed a resection of the inferior articulating facet of T12.  Next using Bovie cautery and gentle retraction we moved along the outside of the transverse process pars and facet fully freeing this from the soft tissue.  Due to tumor invasion the bone was found to be quite soft.  Therefore we were able to fracture the bone through the pedicle out laterally and remove this en bloc.  Pedicles found to be fully invested with tumor.  Using suction and pituitary we were able to resect the remaining portion of the pedicle.  Easily identifying the exiting nerve root.  This was protected and removed from investing tumor.  This was found to be intact.  Working both superior and inferior to the tumor we then entered the vertebral body and performed a rough corpectomy using pituitary and brittany chairs.  Next removed along the outside of the vertebral body.  Using blunt dissection to remove soft tissue from the vertebral body itself until we could clearly identify the anterior portion of the vertebral body.  A brain ribbon was placed along the outside of the vertebral body and anteriorly to protect the great vessels and soft tissue.  Next attention was turned to the superior disc spaces.  At both the superior disc space T12/L1 and the inferior disc space L1/2, we used Stealth to identify the disc spaces.  Soft tissue was retracted and this was entered with  was thoroughly irrigated with 1 L of bacitracin irrigant and dried with satisfactory hemostasis noted.  The posterolateral gutters were then packed with autograft, allograft, and demineralized bone matrix. Thorough hemostasis was ascertained after checking the construct closely and fluoroscopically.     A 7 Estonian flat drain was placed and tunneled out above the incision.  The deep paraspinal musculature was closed with 0-Vicryl sutures.  The muscle fascia was closed with 0-Vicryl sutures as well.  Subcutaneous tissues were closed with inverted 2-0 Vicryl sutures and the skin was closed with 4-0 Monocryl.  The incision was covered with xeroform and Ioban.  All counts were noted to be correct at the end of the case.  They were placed back on the hospital gurney and extubated. The patient was taken to the recovery room in stable condition.    OPERATIVE FINDINGS:   Cancer involvement at T11 and L1 as anticipated from preoperative imaging studies. Pedicle screw placement with computer assisted navigation appeared satisfactory with satisfactory purchase and positioning noted at all sites as well as satisfactory findings upon probing of the pedicular tracts at each site. In addition, spacer snugness and positioning appeared satisfactory on imaging. Electrophysiological monitoring was carried out throughout the procedure and remained stable with no undue changes reported.     Estimated Blood Loss: 1800 mL    Complications: Neuro monitoring remained stable.     Implants:   Implant Name Type Inv. Item Serial No.  Lot No. LRB No. Used Action   HEMOST ABS SURGIFOAM  8X12 10MM - [id number] Implant HEMOST ABS SURGIFOAM  8X12 10MM  [employer]  DIV OF J AND J [id number] Right 1 Implanted   KT HEMOST ABS SURGIFOAM PORCN 1GRAM - [id number] Implant KT HEMOST ABS SURGIFOAM PORCN 1GRAM  [employer]  DIV OF J AND J [id number] Right 1 Implanted   KT HEMOST ABS SURGIFOAM PORCN 1GRAM - [id number] Implant KT HEMOST ABS SURGIFOAM  PORCN 1GRAM  [facility]  DIV OF J AND J [id number] Right 1 Implanted   SCRW SOLERA MAS 5.5/6MM 6.5X50MM - [id number] Implant SCRW SOLERA MAS 5.5/6MM 6.5X50MM  [employer] NR Right 2 Implanted   SCRW SOLERA MAS COCR 7.5X45MM - [id number] Implant SCRW SOLERA MAS COCR 7.5X45MM  [employer] NR Right 1 Implanted   SCRW SOLERA MAS 7.5X50MM - [id number] Implant SCRW SOLERA MAS 7.5X50MM  [employer] NR Right 1 Implanted   SCRW SOLERA MAS 5.5/6MM 6.5X55MM - [id number] Implant SCRW SOLERA MAS 5.5/6MM 6.5X55MM  [employer] NR Right 1 Implanted   SCRW SOLERA MAS COCR 7.5X55MM - [id number] Implant SCRW SOLERA MAS COCR 7.5X55MM  [employer] NR Right 1 Implanted   SCRW SOLERA MAS 5.5/6MM 5.5X55MM - [id number] Implant SCRW SOLERA MAS 5.5/6MM 5.5X55MM  [employer] NR Right 3 Implanted   SCRW SOLERA MAS COCR 7.5X60MM - [id number] Implant SCRW SOLERA MAS COCR 7.5X60MM  [employer] NR Right 1 Implanted   RACIEL SOLERA CHROMALLOY PLS STR [id number] - [id number] Implant RACIEL SOLERA CHROMALLOY PLS STR [id number]  [employer] NA Right 1 Implanted   6.0CROSSLINK    [employer] NA Right 1 Implanted   SCRW ST SOLERA BRKOFF TI 5.5MM - [id number] Implant SCRW ST SOLERA BRKOFF TI 5.5MM  [employer] NA Right 10 Implanted   CTR/PC CERV [id number] SZEC 20MM - [id number] Implant CTR/PC CERV [id number] SZEC 20MM  [employer] NA Right 1 Implanted   ALLOGRFT DBM MAGNIFUSE 1X20CM - [id number] - [id number] Implant ALLOGRFT DBM MAGNIFUSE 1X20CM [id number] [employer] A [employer] [place] NA Right 1 Implanted   PUTTY DBM VIVIANA 5CC - [id number] Implant PUTTY DBM VIVIANA 5CC  [employer] [id number] Right 1 Implanted       Specimens:                Specimens       ID Source Type Tests Collected By Collected At Frozen?    A Back, Lower Tissue TISSUE PATHOLOGY EXAM   Endy Marte MD 7/29/25 9478 No    Description: THORACIC/LUMBAR SPINE TUMOR              Drains:   Closed/Suction Drain 1 Posterior Back Bulb 7 Fr. (Active)   Site Description Unable to view  07/30/25 2000   Dressing Status Clean;Intact;Dry 07/30/25 2000   Drainage Appearance Bloody 07/30/25 2000   Status To bulb suction 07/30/25 2000   Output (mL) 30 mL 07/31/25 0800       [REMOVED] Urethral Catheter Silicone 16 Fr. (Removed)   Daily Indications Required activity restriction from trauma, surgery, (e.g. unstable spine, fracture, hemodynamics) 07/30/25 0400   Site Assessment Skin intact;Clean 07/30/25 0400   Collection Container Standard drainage bag 07/30/25 0400   Securement Method Securing device 07/30/25 0400   Catheter care complete Yes 07/29/25 2009   Output (mL) 225 mL 07/30/25 0609          Electronically signed by Endy Marte MD at 07/31/25 1052          Discharge Summary        Endy Marte MD at 08/02/25 0815            Date of Discharge:  8/2/2025    Discharge Diagnosis:   1. Impaired mobility [Z74.09]    2. 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 Metastatic cancer to spine    4. Chronic dyspnea    5. Type 2 diabetes mellitus with hyperglycemia, without long-term current use of insulin    6. Metastasis to spinal column    7. 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 Impaired mobility [Z74.09]    2. 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 Metastatic cancer to spine    4. Chronic dyspnea    5. Type 2 diabetes mellitus with hyperglycemia, without long-term current use of insulin    6. Metastasis to spinal column    7. Mass of upper lobe of right lung        Hospital Course  Porfirio Gates is a 69 y.o. male with a significant comorbidity of known lung mass, hypertension, type 2 diabetes, class I obesity, pulmonary emphysema. He presents with a new problem of 7 out of 10 back pain. Physical exam findings of bilateral Babinski  and hyperreflexia in his lower extremities with increased tone in his lower extremities as well as marked cervical kyphosis.  His imaging shows lytic lesions to L1 and T11 with L1 suggesting canal compromise and violation of the posterior wall of the spinal column on noncontrast CT.      MRI of the lumbar spine and CT of the lumbar spine reveal involvement of the posterior elements of T11 which have been removed during a previous open biopsy.  Additionally there is involvement of approximately three quarters of the L1 vertebral body resulting in near vertebral body collapse and extensive epidural compression of the spinal cord and nerve roots at this level.    Patient was initially evaluated in clinic and after discussing the risk benefits and possible complications of surgery Porfirio was brought to the main operating room for T10-L3 instrumented fusion with an L1 corpectomy on 7/29/2025.  Postoperatively he did well.  His neuro exam remained stable.  He was initially observed in the ICU for several days.  He was noticed to have supraventricular tachycardia which was treated medically with metoprolol.  Additionally had some hypotension which responded to fluid boluses.  His pain was controlled on oral medication, they were evaluated by Physical Therapy and Occupational Therapy and deemed appropriate for discharge home with assistance and home health.  At discharge the patient was able to ambulate with a walker.  Postoperative education was performed at bedside by myself which included wound care instructions, maximal physical activity, use of postoperative pain medication including tapering, and indications for contacting my office or the emergency room.  Arrangements for postoperative follow-up in my clinic with a wound check in 2 weeks with my nurse practitioner and at 6 weeks by myself.      Procedures Performed  Procedure(s):  T10-L3 instrumentation and LUMBAR CORPECTOMY, right with Stealth and O-Arm        Consults:   Consults       Date and Time Order Name Status Description    7/31/2025  8:40 AM Inpatient Cardiology Consult Completed             Pertinent Test Results:                 Lab Results (last 24 hours)       Procedure Component Value Units Date/Time    POC Glucose 4x Daily Before Meals & at Bedtime [[id number]]  (Normal) Collected: 08/02/25 0740    Specimen: Blood Updated: 08/02/25 0742     Glucose 86 mg/dL      Comment: Serial Number: [id number]:  [id number]       Comprehensive Metabolic Panel [[id number]]  (Abnormal) Collected: 08/02/25 0419    Specimen: Blood Updated: 08/02/25 0553     Glucose 90 mg/dL      BUN 16.8 mg/dL      Creatinine 0.84 mg/dL      Sodium 141 mmol/L      Potassium 3.5 mmol/L      Chloride 109 mmol/L      CO2 23.0 mmol/L      Calcium 8.2 mg/dL      Total Protein 5.6 g/dL      Albumin 2.9 g/dL      ALT (SGPT) 24 U/L      AST (SGOT) 48 U/L      Alkaline Phosphatase 50 U/L      Total Bilirubin 0.3 mg/dL      Globulin 2.7 gm/dL      A/G Ratio 1.1 g/dL      BUN/Creatinine Ratio 20.0     Anion Gap 9.0 mmol/L      eGFR 95.0 mL/min/1.73     Narrative:      GFR Categories in Chronic Kidney Disease (CKD)              GFR Category          GFR (mL/min/1.73)    Interpretation  G1                    90 or greater        Normal or high (1)  G2                    60-89                Mild decrease (1)  G3a                   45-59                Mild to moderate decrease  G3b                   30-44                Moderate to severe decrease  G4                    15-29                Severe decrease  G5                    14 or less           Kidney failure    (1)In the absence of evidence of kidney disease, neither GFR category G1 or G2 fulfill the criteria for CKD.    eGFR calculation 2021 CKD-EPI creatinine equation, which does not include race as a factor    CBC Auto Differential [[id number]]  (Abnormal) Collected: 08/02/25 0419    Specimen: Blood Updated: 08/02/25 0529     WBC 11.50  10*3/mm3      RBC 2.72 10*6/mm3      Hemoglobin 8.0 g/dL      Hematocrit 24.2 %      MCV 89.0 fL      MCH 29.4 pg      MCHC 33.1 g/dL      RDW 13.8 %      RDW-SD 43.0 fl      MPV 10.8 fL      Platelets 221 10*3/mm3      Neutrophil % 72.3 %      Lymphocyte % 16.6 %      Monocyte % 8.3 %      Eosinophil % 1.8 %      Basophil % 0.5 %      Immature Grans % 0.5 %      Neutrophils, Absolute 8.31 10*3/mm3      Lymphocytes, Absolute 1.91 10*3/mm3      Monocytes, Absolute 0.95 10*3/mm3      Eosinophils, Absolute 0.21 10*3/mm3      Basophils, Absolute 0.06 10*3/mm3      Immature Grans, Absolute 0.06 10*3/mm3      nRBC 0.0 /100 WBC     POC Glucose Once [[id number]]  (Normal) Collected: 08/02/25 0015    Specimen: Blood Updated: 08/02/25 0018     Glucose 92 mg/dL      Comment: Serial Number: [id number]:  [id number]       POC Glucose Once [[id number]]  (Normal) Collected: 08/01/25 2021    Specimen: Blood Updated: 08/01/25 2023     Glucose 91 mg/dL      Comment: Serial Number: [id number]:  [id number]       POC Glucose Once [[id number]]  (Normal) Collected: 08/01/25 1646    Specimen: Blood Updated: 08/01/25 1649     Glucose 106 mg/dL      Comment: Serial Number: [id number]:  [id number]       POC Glucose 4x Daily Before Meals & at Bedtime [[id number]]  (Normal) Collected: 08/01/25 1134    Specimen: Blood Updated: 08/01/25 1136     Glucose 99 mg/dL      Comment: Serial Number: [id number]:  [id number]               Condition on Discharge: Stable    Vital Signs  Temp:  [97.9 °F (36.6 °C)-99 °F (37.2 °C)] 97.9 °F (36.6 °C)  Heart Rate:  [] 78  Resp:  [16-18] 18  BP: ()/(46-85) 112/62    Physical Exam:   Physical Exam  Constitutional:       General: He is awake.   Eyes:      Extraocular Movements: Extraocular movements intact.      Pupils: Pupils are equal, round, and reactive to light.   Neurological:      Motor: Motor strength is normal.  Psychiatric:         Speech: Speech normal.          Neurological  Exam  Mental Status  Awake. Oriented to person, place and time. Speech is normal. Language is fluent with no aphasia. Attention and concentration are normal.    Cranial Nerves  CN II: Visual acuity is normal.  CN III, IV, VI: Extraocular movements intact bilaterally. Pupils equal round and reactive to light bilaterally.  CN VII: Full and symmetric facial movement.    Motor  Normal muscle bulk throughout. Normal muscle tone. Strength is 5/5 throughout all four extremities.    Sensory  Light touch is normal in upper and lower extremities.     Gait  Casual gait is normal including stance, stride, and arm swing.      Discharge Disposition  Home or Self Care    Discharge Medications     Discharge Medications        New Medications        Instructions Start Date   ascorbic acid 1000 MG tablet  Commonly known as: VITAMIN C   1,000 mg, Oral, Daily   Start Date: August 3, 2025     cyclobenzaprine 10 MG tablet  Commonly known as: FLEXERIL   10 mg, Oral, 3 Times Daily PRN      gabapentin 100 MG capsule  Commonly known as: NEURONTIN   100 mg, Oral, 3 Times Daily      Lidocaine 4 %   1 patch, Transdermal, Every 24 Hours Scheduled, Remove & Discard patch within 12 hours or as directed by MD   Start Date: August 3, 2025     metoprolol tartrate 25 MG tablet  Commonly known as: LOPRESSOR   12.5 mg, Oral, Every 12 Hours Scheduled      mupirocin 2 % nasal ointment  Commonly known as: BACTROBAN   1 Application, Each Nare, 2 Times Daily      naloxone 4 MG/0.1ML nasal spray  Commonly known as: NARCAN   Call 911. Don't prime. Salyer in 1 nostril for overdose. Repeat in 2-3 minutes in other nostril if no or minimal breathing/responsiveness.      oxyCODONE-acetaminophen  MG per tablet  Commonly known as: PERCOCET   1 tablet, Oral, Every 6 Hours PRN      polyethylene glycol 17 g packet  Commonly known as: MIRALAX   17 g, Oral, Daily   Start Date: August 3, 2025     Vitamin A 3 MG (93364 UT) capsule   20,000 Units, Oral, Daily   Start  Date: August 3, 2025     zinc sulfate 220 (50 Zn) MG capsule  Commonly known as: ZINCATE   220 mg, Oral, Daily   Start Date: August 3, 2025            Continue These Medications        Instructions Start Date   aspirin 81 MG EC tablet   81 mg, Daily      atorvastatin 40 MG tablet  Commonly known as: LIPITOR   40 mg, Oral, Nightly      clotrimazole-betamethasone 1-0.05 % cream  Commonly known as: LOTRISONE   1 Application, Topical, 2 Times Daily      losartan 25 MG tablet  Commonly known as: COZAAR   25 mg, Oral, Daily      metFORMIN  MG 24 hr tablet  Commonly known as: GLUCOPHAGE-XR   500 mg, Oral, 2 Times Daily      Ozempic (0.25 or 0.5 MG/DOSE) 2 MG/3ML solution pen-injector  Generic drug: Semaglutide(0.25 or 0.5MG/DOS)   Subcutaneous, Weekly, Pt states is no longer going to  be taking but did take 2 weeks ago       sennosides-docusate 8.6-50 MG per tablet  Commonly known as: PERICOLACE   2 tablets, Oral, Daily             Stop These Medications      ibuprofen 200 MG tablet  Commonly known as: ADVIL,MOTRIN              Discharge Diet:  as tolerated    Activity at Discharge:  ambulate with roll walker.  No bending lifting or twisting.  No lifting more than 8 lbs    Follow-up Appointments  Future Appointments   Date Time Provider Department Center   9/3/2025  9:00 AM Jenaro Graff MD [facility]   9/17/2025  9:00 AM Endy Huitron MD [facility]     Additional Instructions for the Follow-ups that You Need to Schedule       Ambulatory Referral to Home Health   As directed      Face to Face Visit Date: 8/2/2025   Follow-up provider for Plan of Care?: I will be treating the patient on an ongoing basis.  Please send me the Plan of Care for signature.   Follow-up provider: ENDY HUITRON [[id number]]   Reason/Clinical Findings: medication managment, wound care, home PT/OT   Describe mobility limitations that make leaving home difficult: large thoricopelvic fusion, new diagnosis of cancer    Nursing/Therapeutic Services Requested: Skilled Nursing Physical Therapy Occupational Therapy   Skilled nursing orders: Pain management Medication education   PT orders: Gait Training   Weight Bearing Status: As Tolerated   Occupational orders: Strengthening Activities of daily living   Frequency: 1 Week 1        Ambulatory Referral to Oncology   As directed              Test Results Pending at Discharge       Endy Marte MD  08/02/25  08:17 CDT    Time: Discharge 20 min      Electronically signed by Endy Marte MD at 08/02/25 0822

## 2025-08-02 NOTE — PLAN OF CARE
Problem: Adult Inpatient Plan of Care  Goal: Absence of Hospital-Acquired Illness or Injury  Intervention: Identify and Manage Fall Risk  Recent Flowsheet Documentation  Taken 8/2/2025 0600 by Deanna Lynch RN  Safety Promotion/Fall Prevention:   safety round/check completed   fall prevention program maintained  Taken 8/2/2025 0521 by Deanna Lynch RN  Safety Promotion/Fall Prevention:   safety round/check completed   fall prevention program maintained  Taken 8/2/2025 0404 by Deanna Lynch RN  Safety Promotion/Fall Prevention:   safety round/check completed   fall prevention program maintained  Taken 8/2/2025 0300 by Deanna Lynch RN  Safety Promotion/Fall Prevention:   safety round/check completed   fall prevention program maintained  Taken 8/2/2025 0200 by Deanna Lynch RN  Safety Promotion/Fall Prevention:   safety round/check completed   fall prevention program maintained  Taken 8/2/2025 0100 by Deanna Lynch RN  Safety Promotion/Fall Prevention:   safety round/check completed   fall prevention program maintained  Taken 8/2/2025 0017 by Deanna Lynch RN  Safety Promotion/Fall Prevention:   safety round/check completed   fall prevention program maintained  Taken 8/1/2025 2300 by Deanna Lynch RN  Safety Promotion/Fall Prevention:   safety round/check completed   fall prevention program maintained  Taken 8/1/2025 2200 by Deanna Lynch RN  Safety Promotion/Fall Prevention:   safety round/check completed   fall prevention program maintained  Taken 8/1/2025 2100 by Deanna Lynch RN  Safety Promotion/Fall Prevention:   safety round/check completed   fall prevention program maintained  Taken 8/1/2025 2028 by Deanna Lynch RN  Safety Promotion/Fall Prevention:   safety round/check completed   fall prevention program maintained  Taken 8/1/2025 1906 by Deanna Lynch RN  Safety Promotion/Fall Prevention:   safety round/check completed   fall prevention program maintained  Intervention: Prevent Skin Injury  Recent Flowsheet Documentation  Taken  8/2/2025 0521 by Deanna Lynch RN  Body Position:   position changed independently   turned   right  Taken 8/2/2025 0300 by Deanna Lynch RN  Body Position:   position changed independently   side-lying  Taken 8/2/2025 0100 by Deanna Lynch RN  Body Position:   turned   left   position changed independently  Taken 8/1/2025 2300 by Deanna Lynch RN  Body Position:   position changed independently   side-lying   right  Taken 8/1/2025 2100 by Deanna Lynch RN  Body Position:   position changed independently   turned   right  Taken 8/1/2025 2028 by Deanna Lynch RN  Skin Protection:   incontinence pads utilized   skin sealant/moisture barrier applied   transparent dressing maintained   silicone foam dressing in place  Taken 8/1/2025 1906 by Deanna Lynch RN  Body Position: position changed independently  Goal: Optimal Comfort and Wellbeing  Intervention: Monitor Pain and Promote Comfort  Recent Flowsheet Documentation  Taken 8/1/2025 1906 by Deanna Lynch RN  Pain Management Interventions:   position adjusted   pillow support provided  Intervention: Provide Person-Centered Care  Recent Flowsheet Documentation  Taken 8/1/2025 2028 by Deanna Lynch RN  Trust Relationship/Rapport:   care explained   choices provided   thoughts/feelings acknowledged   Goal Outcome Evaluation:

## 2025-08-02 NOTE — DISCHARGE PLACEMENT REQUEST
"Porfirio Gates \"Jorje\" (69 y.o. Male)       Date of Birth   1955    Social Security Number       Address   [address] [place], [place] [address]    Home Phone   [phone]    MRN   [id number]       Muslim   [facility]    Marital Status   Single                            Admission Date   7/29/2025    Admission Type   Elective    Admitting Provider   Endy Marte MD    Attending Provider       Department, Room/Bed   [facility] 3A, 352/1       Discharge Date   8/2/2025    Discharge Disposition   Home or Self Care    Discharge Destination                                 Attending Provider: (none)   Allergies: No Known Allergies    Isolation: None   Infection: None   Code Status: CPR    Ht: 175 cm (68.9\")   Wt: 99 kg (218 lb 4.1 oz)    Admission Cmt: None   Principal Problem: 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 [C79.51,C80.1]                   Active Insurance as of 7/29/2025       Primary Coverage       Payor Plan Insurance Group Employer/Plan Group    HUMANA MEDICARE REPLACEMENT HU[employer]NA MEDICARE ADVANTAGE PPO [id number]       Payor Plan Address Payor Plan Phone Number Payor Plan Fax Number Effective Dates    [address] [address] [phone]  7/1/2024 - None Entered    [facility] [id number]         Subscriber Name Subscriber Birth Date Member ID       PORFIRIO GATES 1955 [id number]                     Emergency Contacts        (Rel.) Home Phone Work Phone Mobile Phone    Tiffanie Joiner (Sister) -- -- [phone]    héctor little (Daughter) [phone] -- --                "

## 2025-08-03 NOTE — THERAPY DISCHARGE NOTE
Acute Care - Occupational Therapy Discharge Summary  [facility]     Patient Name: Porfirio Gates  : 1955  MRN: [id number]    Today's Date: 8/3/2025                 Admit Date: 2025        OT Recommendation and Plan    Visit Dx:    ICD-10-CM ICD-9-CM   1. Impaired mobility [Z74.09]  Z74.09 799.89   2. 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 Metastatic cancer to spine  C79.51 198.5   4. Chronic dyspnea  R06.09 786.09   5. Type 2 diabetes mellitus with hyperglycemia, without long-term current use of insulin  E11.65 250.00     790.29   6. Metastasis to spinal column  C79.51 198.5   7. Mass of upper lobe of right lung  R91.8 786.6                OT Rehab Goals       Row Name 25 1504             Bathing Goal 1 (OT)    Activity/Device (Bathing Goal 1, OT) bathing skills, all  -KP      [place] Level/Cues Needed (Bathing Goal 1, OT) modified independence  -KP      Time Frame (Bathing Goal 1, OT) long term goal (LTG);by discharge  -KP      Progress/Outcomes (Bathing Goal 1, OT) goal partially met  -KP         Dressing Goal 1 (OT)    Activity/Device (Dressing Goal 1, OT) dressing skills, all  -KP      [place]/Cues Needed (Dressing Goal 1, OT) modified independence  -KP      Time Frame (Dressing Goal 1, OT) long term goal (LTG);by discharge  -KP         Toileting Goal 1 (OT)    Activity/Device (Toileting Goal 1, OT) toileting skills, all  -KP      [place] Level/Cues Needed (Toileting Goal 1, OT) modified independence  -KP      Time Frame (Toileting Goal 1, OT) long term goal (LTG);by discharge  -KP      Progress/Outcome (Toileting Goal 1, OT) goal not met  -KP         Problem Specific Goal 1 (OT)    Problem Specific Goal 1 (OT) Pt will independently implement one pain management technique to decrease pain and improve functional adl performance.  -KP      Time Frame (Problem Specific Goal 1, OT) long term goal (LTG);by  discharge  -      Progress/Outcome (Problem Specific Goal 1, OT) goal partially met  -                User Key  (r) = Recorded By, (t) = Taken By, (c) = Cosigned By      Initials Name Provider Type Geri Price KP, OTR/L Occupational Therapist OT                     Outcome Measures       Row Name 08/01/25 1340             How much help from another person do you currently need...    Turning from your back to your side while in flat bed without using bedrails? 4  -MARLEY      Moving from lying on back to sitting on the side of a flat bed without bedrails? 3  -MARLEY      Moving to and from a bed to a chair (including a wheelchair)? 3  -MARLEY      Standing up from a chair using your arms (e.g., wheelchair, bedside chair)? 3  -MARLEY      Climbing 3-5 steps with a railing? 2  -MARLEY      To walk in hospital room? 3  -MARLEY      AM-PAC 6 Clicks Score (PT) 18  -MARLEY         Functional Assessment    Outcome Measure Options AM-PAC 6 Clicks Basic Mobility (PT)  -MARLEY                User Key  (r) = Recorded By, (t) = Taken By, (c) = Cosigned By      Initials Name Provider Type    Shad Perrin, PTA Physical Therapist Assistant                    Timed Therapy Charges  Total Units: 1      Suggested Charges  Total Units: 1      Procedure Name Documented Minutes Units Code    HC OT THERAPEUTIC ACT EA 15 MIN 12 1   30583 (CPT®)                 Documented Minutes  Total Minutes: 12      Therapy Provided Minutes    51979 - OT Therapeutic Activity Minutes 12                        OT Discharge Summary  Anticipated Discharge Disposition (OT): home with assist  Reason for Discharge: Discharge from facility  Outcomes Achieved: Patient able to partially acheive established goals  Discharge Destination: Home      DHIRAJ Douglas  8/3/2025

## 2025-08-03 NOTE — THERAPY DISCHARGE NOTE
Acute Care - Physical Therapy Discharge Summary  [facility]       Patient Name: Porfirio Gates  : 1955  MRN: [id number]    Today's Date: 8/3/2025                 Admit Date: 2025      PT Recommendation and Plan    Visit Dx:    ICD-10-CM ICD-9-CM   1. Impaired mobility [Z74.09]  Z74.09 799.89   2. 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 Metastatic cancer to spine  C79.51 198.5   4. Chronic dyspnea  R06.09 786.09   5. Type 2 diabetes mellitus with hyperglycemia, without long-term current use of insulin  E11.65 250.00     790.29   6. Metastasis to spinal column  C79.51 198.5   7. Mass of upper lobe of right lung  R91.8 786.6        Outcome Measures       Row Name 25 1340 25 1334          How much help from another person do you currently need...    Turning from your back to your side while in flat bed without using bedrails? 4  -MARLEY 3  -MARLEY     Moving from lying on back to sitting on the side of a flat bed without bedrails? 3  -MARLEY 3  -MARLEY     Moving to and from a bed to a chair (including a wheelchair)? 3  -MARLEY 3  -MARLEY     Standing up from a chair using your arms (e.g., wheelchair, bedside chair)? 3  -MARLEY 3  -MARLEY     Climbing 3-5 steps with a railing? 2  -MARLEY 2  -MARLEY     To walk in hospital room? 3  -MARLEY 3  -MARLEY     AM-PAC 6 Clicks Score (PT) 18  -MARLEY 17  -MARLEY        Functional Assessment    Outcome Measure Options AM-PAC 6 Clicks Basic Mobility (PT)  -MARLEY AM-PAC 6 Clicks Basic Mobility (PT)  -MARLEY               User Key  (r) = Recorded By, (t) = Taken By, (c) = Cosigned By      Initials Name Provider Type    Shad Perrin PTA Physical Therapist Assistant                         PT Rehab Goals       Row Name 25 0857             Bed Mobility Goal 1 (PT)    Activity/Assistive Device (Bed Mobility Goal 1, PT) sidelying to sit/sit to sidelying  -NW      [place] Level/Cues Needed (Bed Mobility Goal 1, PT) supervision required   -NW      Time Frame (Bed Mobility Goal 1, PT) long term goal (LTG);10 days  -NW      Progress/Outcomes (Bed Mobility Goal 1, PT) goal not met  -NW         Transfer Goal 1 (PT)    Activity/Assistive Device (Transfer Goal 1, PT) sit-to-stand/stand-to-sit;bed-to-chair/chair-to-bed  -NW      [place] Level/Cues Needed (Transfer Goal 1, PT) supervision required  -NW      Time Frame (Transfer Goal 1, PT) long term goal (LTG);10 days  -NW      Progress/Outcome (Transfer Goal 1, PT) goal not met  -NW         Gait Training Goal 1 (PT)    Activity/Assistive Device (Gait Training Goal 1, PT) gait (walking locomotion);decrease fall risk;improve balance and speed;increase endurance/gait distance  -NW      [place] Level (Gait Training Goal 1, PT) supervision required  -NW      Distance (Gait Training Goal 1, PT) 200 ft  -NW      Time Frame (Gait Training Goal 1, PT) long term goal (LTG);10 days  -NW      Progress/Outcome (Gait Training Goal 1, PT) goal not met  -NW                User Key  (r) = Recorded By, (t) = Taken By, (c) = Cosigned By      Initials Name Provider Type Discipline    NW Medina Ambrocio PTA Physical Therapist Assistant PT                        PT Discharge Summary  Anticipated Discharge Disposition (PT): home  Reason for Discharge: Discharge from facility  Outcomes Achieved: Refer to plan of care for updates on goals achieved  Discharge Destination: Home      Medina Ambrocio PTA   8/3/2025

## 2025-08-04 ENCOUNTER — TELEPHONE (OUTPATIENT)
Dept: INTERNAL MEDICINE | Facility: CLINIC | Age: 70
End: 2025-08-04
Payer: MEDICARE

## 2025-08-04 ENCOUNTER — TELEPHONE (OUTPATIENT)
Age: 70
End: 2025-08-04
Payer: MEDICARE

## 2025-08-04 ENCOUNTER — READMISSION MANAGEMENT (OUTPATIENT)
Dept: CALL CENTER | Facility: HOSPITAL | Age: 70
End: 2025-08-04
Payer: MEDICARE

## 2025-08-04 ENCOUNTER — TRANSITIONAL CARE MANAGEMENT TELEPHONE ENCOUNTER (OUTPATIENT)
Dept: CALL CENTER | Facility: HOSPITAL | Age: 70
End: 2025-08-04
Payer: MEDICARE

## 2025-08-04 NOTE — PROGRESS NOTES
Continued Stay Note  [facility]     Patient Name: Porfirio Gates  MRN: [id number]  Today's Date: 8/4/2025    Admit Date: 7/29/2025    Plan: Home with [facility]   Discharge Plan       Row Name 08/04/25 0719       Plan    Plan Home with [facility]    Patient/Family in Agreement with Plan yes    Provided Post Acute Provider List? Refused    Provided Post Acute Provider Quality & Resource List? Refused    Plan Comments Resent referrals to [facility] and [facility]. Spoke with Dee Dee from [facility], and they accepted for .                   Discharge Codes    No documentation.                 Expected Discharge Date and Time       Expected Discharge Date Expected Discharge Time    Aug 2, 2025               Zaida Newman RN

## 2025-08-06 ENCOUNTER — TELEPHONE (OUTPATIENT)
Dept: INTERNAL MEDICINE | Facility: CLINIC | Age: 70
End: 2025-08-06
Payer: MEDICARE

## 2025-08-06 DIAGNOSIS — E11.65 TYPE 2 DIABETES MELLITUS WITH HYPERGLYCEMIA, WITHOUT LONG-TERM CURRENT USE OF INSULIN: ICD-10-CM

## 2025-08-06 DIAGNOSIS — C79.51 MALIGNANT NEOPLASM METASTATIC TO LUMBAR SPINE WITH UNKNOWN PRIMARY SITE: Primary | ICD-10-CM

## 2025-08-06 DIAGNOSIS — C79.51 METASTASIS TO SPINAL COLUMN: ICD-10-CM

## 2025-08-06 DIAGNOSIS — C80.1 MALIGNANT NEOPLASM METASTATIC TO LUMBAR SPINE WITH UNKNOWN PRIMARY SITE: Primary | ICD-10-CM

## 2025-08-07 RX ORDER — BLOOD-GLUCOSE METER
1 KIT MISCELLANEOUS AS NEEDED
Qty: 1 EACH | Refills: 0 | Status: SHIPPED | OUTPATIENT
Start: 2025-08-07

## 2025-08-07 RX ORDER — AVOBENZONE, HOMOSALATE, OCTISALATE, OCTOCRYLENE 30; 40; 45; 26 MG/ML; MG/ML; MG/ML; MG/ML
1 CREAM TOPICAL DAILY
Qty: 50 EACH | Refills: 5 | Status: SHIPPED | OUTPATIENT
Start: 2025-08-07

## 2025-08-09 LAB
QT INTERVAL: 302 MS
QTC INTERVAL: 457 MS

## 2025-08-14 ENCOUNTER — APPOINTMENT (OUTPATIENT)
Dept: LAB | Facility: HOSPITAL | Age: 70
End: 2025-08-14
Payer: MEDICARE

## 2025-08-14 ENCOUNTER — CONSULT (OUTPATIENT)
Dept: ONCOLOGY | Facility: CLINIC | Age: 70
End: 2025-08-14
Payer: MEDICARE

## 2025-08-14 ENCOUNTER — OFFICE VISIT (OUTPATIENT)
Dept: NEUROSURGERY | Facility: CLINIC | Age: 70
End: 2025-08-14
Payer: MEDICARE

## 2025-08-14 ENCOUNTER — CONSULT (OUTPATIENT)
Age: 70
End: 2025-08-14
Payer: MEDICARE

## 2025-08-14 ENCOUNTER — HOSPITAL ENCOUNTER (OUTPATIENT)
Dept: GENERAL RADIOLOGY | Facility: HOSPITAL | Age: 70
Discharge: HOME | End: 2025-08-14
Payer: MEDICARE

## 2025-08-14 ENCOUNTER — DOCUMENTATION (OUTPATIENT)
Dept: RADIATION ONCOLOGY | Facility: HOSPITAL | Age: 70
End: 2025-08-14
Payer: MEDICARE

## 2025-08-14 VITALS
WEIGHT: 225.2 LBS | TEMPERATURE: 97.5 F | SYSTOLIC BLOOD PRESSURE: 130 MMHG | HEIGHT: 69 IN | OXYGEN SATURATION: 98 % | HEART RATE: 79 BPM | RESPIRATION RATE: 18 BRPM | DIASTOLIC BLOOD PRESSURE: 68 MMHG | BODY MASS INDEX: 33.36 KG/M2

## 2025-08-14 VITALS
RESPIRATION RATE: 20 BRPM | WEIGHT: 225 LBS | HEIGHT: 69 IN | HEART RATE: 96 BPM | BODY MASS INDEX: 33.33 KG/M2 | DIASTOLIC BLOOD PRESSURE: 62 MMHG | OXYGEN SATURATION: 100 % | SYSTOLIC BLOOD PRESSURE: 142 MMHG

## 2025-08-14 VITALS — BODY MASS INDEX: 32.29 KG/M2 | WEIGHT: 218 LBS | HEIGHT: 69 IN

## 2025-08-14 DIAGNOSIS — C79.51 METASTATIC CANCER TO SPINE: Primary | ICD-10-CM

## 2025-08-14 DIAGNOSIS — Z72.0 TOBACCO USE: ICD-10-CM

## 2025-08-14 DIAGNOSIS — C80.1 MALIGNANT NEOPLASM METASTATIC TO LUMBAR SPINE WITH UNKNOWN PRIMARY SITE: ICD-10-CM

## 2025-08-14 DIAGNOSIS — C80.1 MALIGNANT NEOPLASM METASTATIC TO LUMBAR SPINE WITH UNKNOWN PRIMARY SITE: Primary | ICD-10-CM

## 2025-08-14 DIAGNOSIS — C79.51 MALIGNANT NEOPLASM METASTATIC TO LUMBAR SPINE WITH UNKNOWN PRIMARY SITE: ICD-10-CM

## 2025-08-14 DIAGNOSIS — C79.51 MALIGNANT NEOPLASM METASTATIC TO LUMBAR SPINE WITH UNKNOWN PRIMARY SITE: Primary | ICD-10-CM

## 2025-08-14 DIAGNOSIS — R91.8 MASS OF UPPER LOBE OF RIGHT LUNG: ICD-10-CM

## 2025-08-14 DIAGNOSIS — C79.51 METASTASIS TO SPINAL COLUMN: Primary | ICD-10-CM

## 2025-08-14 PROCEDURE — 99024 POSTOP FOLLOW-UP VISIT: CPT | Performed by: NURSE PRACTITIONER

## 2025-08-14 PROCEDURE — 72100 X-RAY EXAM L-S SPINE 2/3 VWS: CPT

## 2025-08-14 PROCEDURE — 72072 X-RAY EXAM THORAC SPINE 3VWS: CPT

## 2025-08-14 RX ORDER — OXYCODONE AND ACETAMINOPHEN 10; 325 MG/1; MG/1
1 TABLET ORAL EVERY 4 HOURS PRN
Qty: 18 TABLET | Refills: 0 | Status: SHIPPED | OUTPATIENT
Start: 2025-08-14

## 2025-08-14 RX ORDER — ONDANSETRON 8 MG/1
8 TABLET, FILM COATED ORAL EVERY 8 HOURS PRN
Qty: 30 TABLET | Refills: 3 | Status: SHIPPED | OUTPATIENT
Start: 2025-08-14

## 2025-08-14 RX ORDER — FOLIC ACID 1 MG/1
1 TABLET ORAL DAILY
Qty: 30 TABLET | Refills: 6 | Status: SHIPPED | OUTPATIENT
Start: 2025-08-14

## 2025-08-14 RX ORDER — MUPIROCIN 2 %
OINTMENT (GRAM) TOPICAL
COMMUNITY
Start: 2025-08-02

## 2025-08-14 RX ORDER — CEPHALEXIN 500 MG/1
500 CAPSULE ORAL 2 TIMES DAILY
Qty: 14 CAPSULE | Refills: 0 | Status: SHIPPED | OUTPATIENT
Start: 2025-08-14

## 2025-08-14 RX ORDER — OXYCODONE AND ACETAMINOPHEN 10; 325 MG/1; MG/1
1 TABLET ORAL EVERY 6 HOURS PRN
COMMUNITY

## 2025-08-15 LAB
QT INTERVAL: 372 MS
QT INTERVAL: 378 MS
QTC INTERVAL: 505 MS
QTC INTERVAL: 541 MS

## 2025-08-21 ENCOUNTER — OFFICE VISIT (OUTPATIENT)
Dept: NEUROSURGERY | Facility: CLINIC | Age: 70
End: 2025-08-21
Payer: MEDICARE

## 2025-08-21 ENCOUNTER — HOSPITAL ENCOUNTER (OUTPATIENT)
Dept: RADIATION ONCOLOGY | Facility: HOSPITAL | Age: 70
Discharge: HOME | End: 2025-08-21

## 2025-08-21 ENCOUNTER — LAB (OUTPATIENT)
Dept: LAB | Facility: HOSPITAL | Age: 70
End: 2025-08-21
Payer: MEDICARE

## 2025-08-21 VITALS — WEIGHT: 225 LBS | BODY MASS INDEX: 33.33 KG/M2 | HEIGHT: 69 IN

## 2025-08-21 DIAGNOSIS — C79.51 METASTATIC CANCER TO SPINE: Primary | ICD-10-CM

## 2025-08-21 DIAGNOSIS — Z72.0 TOBACCO ABUSE: ICD-10-CM

## 2025-08-21 DIAGNOSIS — E66.811 CLASS 1 OBESITY DUE TO EXCESS CALORIES WITH SERIOUS COMORBIDITY AND BODY MASS INDEX (BMI) OF 33.0 TO 33.9 IN ADULT: ICD-10-CM

## 2025-08-21 DIAGNOSIS — Z98.1 STATUS POST LUMBAR SPINAL FUSION: ICD-10-CM

## 2025-08-21 DIAGNOSIS — E66.09 CLASS 1 OBESITY DUE TO EXCESS CALORIES WITH SERIOUS COMORBIDITY AND BODY MASS INDEX (BMI) OF 33.0 TO 33.9 IN ADULT: ICD-10-CM

## 2025-08-21 PROCEDURE — 99024 POSTOP FOLLOW-UP VISIT: CPT | Performed by: NURSE PRACTITIONER

## 2025-08-21 PROCEDURE — 1160F RVW MEDS BY RX/DR IN RCRD: CPT | Performed by: NURSE PRACTITIONER

## 2025-08-21 PROCEDURE — 1159F MED LIST DOCD IN RCRD: CPT | Performed by: NURSE PRACTITIONER

## 2025-08-26 ENCOUNTER — TELEPHONE (OUTPATIENT)
Dept: ONCOLOGY | Facility: CLINIC | Age: 70
End: 2025-08-26
Payer: MEDICARE

## 2025-08-29 ENCOUNTER — HOSPITAL ENCOUNTER (OUTPATIENT)
Dept: CT IMAGING | Facility: HOSPITAL | Age: 70
Discharge: HOME | End: 2025-08-29
Payer: MEDICARE

## 2025-08-29 DIAGNOSIS — Z72.0 TOBACCO USE: ICD-10-CM

## 2025-08-29 DIAGNOSIS — C79.51 METASTATIC CANCER TO SPINE: ICD-10-CM

## 2025-08-29 DIAGNOSIS — R91.8 MASS OF UPPER LOBE OF RIGHT LUNG: ICD-10-CM

## 2025-08-29 LAB — GLUCOSE BLDC GLUCOMTR-MCNC: 173 MG/DL (ref 70–130)

## 2025-08-29 PROCEDURE — 82948 REAGENT STRIP/BLOOD GLUCOSE: CPT

## 2025-08-29 PROCEDURE — 78815 PET IMAGE W/CT SKULL-THIGH: CPT

## 2025-08-29 PROCEDURE — 34310000005 FLUDEOXYGLUCOSE F18 SOLUTION: Performed by: RADIOLOGY

## 2025-08-29 PROCEDURE — A9552 F18 FDG: HCPCS | Performed by: RADIOLOGY

## 2025-08-29 RX ADMIN — FLUDEOXYGLUCOSE F18 1 DOSE: 300 INJECTION INTRAVENOUS at 10:31

## (undated) DEVICE — GLV SURG DERMASSURE GRN LF PF 7.0

## (undated) DEVICE — A2000 MULTI-USE SYRINGE KIT, P/N 701277-003KIT CONTENTS: 100ML CONTRAST RESERVOIR AND TUBING WITH CONTRAST SPIKE AND CLAMP: Brand: A2000 MULTI-USE SYRINGE KIT

## (undated) DEVICE — MODEL BT2000 P/N 700287-012KIT CONTENTS: MANIFOLD WITH SALINE AND CONTRAST PORTS, SALINE TUBING WITH SPIKE AND HAND SYRINGE, TRANSDUCER: Brand: BT2000 AUTOMATED MANIFOLD KIT

## (undated) DEVICE — GLV SURG SENSICARE W/ALOE PF LF 7.5 STRL

## (undated) DEVICE — SPK10277 JACKSON/PRO-AXIS KIT: Brand: SPK10277 JACKSON/PRO-AXIS KIT

## (undated) DEVICE — GW SENSR DUALFLEX NITNL STR .038IN 3X150CM

## (undated) DEVICE — PAD, DEFIB, ADULT, RADIOTRANS, PHYSIO: Brand: MEDLINE

## (undated) DEVICE — NITINOL STONE RETRIEVAL BASKET: Brand: ZERO TIP

## (undated) DEVICE — CONTAINER,SPECIMEN,OR STERILE,4OZ: Brand: MEDLINE

## (undated) DEVICE — PK CATH CARD 30 CA/4

## (undated) DEVICE — KT NDL GUIDE STRL 18GA

## (undated) DEVICE — WATER 50W MAX / AIR 8W MAX: Brand: FLEXIVA TRACTIP

## (undated) DEVICE — DEV COMP RAD PRELUDESYNC 29CM

## (undated) DEVICE — CONN FLX BREATHE CIRCT

## (undated) DEVICE — WIPE THERAWASH SLV SPEC CARE 2PK

## (undated) DEVICE — PK KYPHOPLASTY 30

## (undated) DEVICE — TOOL INSRT GW MTL OR PLSTC

## (undated) DEVICE — TRAP FLD MINIVAC MEGADYNE 100ML

## (undated) DEVICE — SUP ARMBRD ART/LINE BLU

## (undated) DEVICE — 6F .070 JL4 100CM: Brand: CORDIS

## (undated) DEVICE — GLV SURG DERMASSURE GRN LF PF 8.0

## (undated) DEVICE — SOLIDIFIER LIQUI LOC PLUS 2000CC

## (undated) DEVICE — CATH DIAG IMPULSE PIG145 5F 110CM

## (undated) DEVICE — DRESSING,GAUZE,XEROFORM,CURAD,5"X9",ST: Brand: CURAD

## (undated) DEVICE — CATH F5 INF JL 4 100CM: Brand: INFINITI

## (undated) DEVICE — BALLOON DILATATION CATHETER KIT: Brand: UROMAX ULTRA KIT

## (undated) DEVICE — THE STERILE LIGHT HANDLE COVER IS USED WITH STERIS SURGICAL LIGHTING AND VISUALIZATION SYSTEMS.

## (undated) DEVICE — GW PRESSUREWIRE X WIRELESS FFR 175CM

## (undated) DEVICE — GW STARTER FXD CORE J .035 3X260CM 3MM

## (undated) DEVICE — GLV SURG BIOGEL M LTX PF 8

## (undated) DEVICE — DRP C/ARM W/BAND W/CLIPS 41X74IN

## (undated) DEVICE — THE STERILE THE STERIS STERILE CAMERA HANDLE COVERS ARE DESIGNED FOR HARMONYAIR 4K CAMERA MODULE, AND PROVIDE STERILE CONTROL THAT ALLOW FOR INCREASING AND DECREASING ILLUMINATION THROUGH SEVEN INTENSITY LEVELS.

## (undated) DEVICE — SUT MNCRYL 4/0 PS2 27IN UD MCP426H

## (undated) DEVICE — PK CYSTO 30

## (undated) DEVICE — NEEDLE, QUINCKE 22GX3.5": Brand: MEDLINE INDUSTRIES, INC.

## (undated) DEVICE — SOL IRR NACL 0.9PCT BT 1000ML

## (undated) DEVICE — DRAPE,ANGIO,BRACH,STERILE,38X44: Brand: MEDLINE

## (undated) DEVICE — CANN NASL ETCO2 LO/FLO A/

## (undated) DEVICE — RADIFOCUS OPTITORQUE ANGIOGRAPHIC CATHETER: Brand: OPTITORQUE

## (undated) DEVICE — MODEL AT P65, P/N 701554-001KIT CONTENTS: HAND CONTROLLER, 3-WAY HIGH-PRESSURE STOPCOCK WITH ROTATING END AND PREMIUM HIGH-PRESSURE TUBING: Brand: ANGIOTOUCH® KIT

## (undated) DEVICE — CATH URETRL OPN/END 5F70CM

## (undated) DEVICE — PK TURNOVER CYSTO RM

## (undated) DEVICE — ENDOSCOPIC SEAL URO 1 SIZE FITS ALL: Brand: ENDOSCOPIC SEAL

## (undated) DEVICE — GOWN,NON-REINFORCED,SIRUS,SET IN SLV,XXL: Brand: MEDLINE

## (undated) DEVICE — APPL CHLORAPREP HI/LITE 26ML ORNG

## (undated) DEVICE — MICRO HVTSA, 0.5G AND HVTSA SOURCEMARK PRODUCT CODE M1206 AND M1206-01: Brand: EXOFIN MICRO HVTSA, 0.5G

## (undated) DEVICE — TOWEL,OR,DSP,ST,BLUE,STD,4/PK,20PK/CS: Brand: MEDLINE

## (undated) DEVICE — GLIDESHEATH SLENDER STAINLESS STEEL KIT: Brand: GLIDESHEATH SLENDER

## (undated) DEVICE — CVR BRD ARM 13X30

## (undated) DEVICE — COPILOT BLEEDBACK CONTROL VALVE: Brand: COPILOT